# Patient Record
Sex: FEMALE | Race: WHITE | Employment: UNEMPLOYED | URBAN - METROPOLITAN AREA
[De-identification: names, ages, dates, MRNs, and addresses within clinical notes are randomized per-mention and may not be internally consistent; named-entity substitution may affect disease eponyms.]

---

## 2020-09-30 LAB
CHOLESTEROL, TOTAL: 165 MG/DL
CHOLESTEROL/HDL RATIO: NORMAL
HDLC SERPL-MCNC: 40 MG/DL (ref 35–70)
LDL CHOLESTEROL CALCULATED: 87 MG/DL (ref 0–160)
NONHDLC SERPL-MCNC: NORMAL MG/DL
TRIGL SERPL-MCNC: 192 MG/DL
VLDLC SERPL CALC-MCNC: 38 MG/DL

## 2021-03-09 ENCOUNTER — OFFICE VISIT (OUTPATIENT)
Dept: CARDIOLOGY CLINIC | Age: 56
End: 2021-03-09
Payer: COMMERCIAL

## 2021-03-09 VITALS
BODY MASS INDEX: 47.09 KG/M2 | WEIGHT: 293 LBS | SYSTOLIC BLOOD PRESSURE: 126 MMHG | HEART RATE: 76 BPM | HEIGHT: 66 IN | OXYGEN SATURATION: 98 % | RESPIRATION RATE: 18 BRPM | DIASTOLIC BLOOD PRESSURE: 82 MMHG

## 2021-03-09 DIAGNOSIS — I10 ESSENTIAL HYPERTENSION: Primary | ICD-10-CM

## 2021-03-09 DIAGNOSIS — I26.02 SADDLE EMBOLUS OF PULMONARY ARTERY WITH ACUTE COR PULMONALE, UNSPECIFIED CHRONICITY (HCC): ICD-10-CM

## 2021-03-09 DIAGNOSIS — I82.533 CHRONIC DEEP VEIN THROMBOSIS (DVT) OF POPLITEAL VEIN OF BOTH LOWER EXTREMITIES (HCC): ICD-10-CM

## 2021-03-09 DIAGNOSIS — I48.91 ATRIAL FIBRILLATION, UNSPECIFIED TYPE (HCC): ICD-10-CM

## 2021-03-09 DIAGNOSIS — R06.09 DOE (DYSPNEA ON EXERTION): ICD-10-CM

## 2021-03-09 PROCEDURE — 99205 OFFICE O/P NEW HI 60 MIN: CPT | Performed by: INTERNAL MEDICINE

## 2021-03-09 PROCEDURE — 93000 ELECTROCARDIOGRAM COMPLETE: CPT | Performed by: INTERNAL MEDICINE

## 2021-03-09 RX ORDER — HYDROCHLOROTHIAZIDE 12.5 MG/1
1 TABLET ORAL DAILY
COMMUNITY
Start: 2021-01-05 | End: 2021-06-23 | Stop reason: SDUPTHER

## 2021-03-09 RX ORDER — LEVOTHYROXINE SODIUM 0.2 MG/1
200 TABLET ORAL DAILY
COMMUNITY
Start: 2020-10-05 | End: 2021-03-10 | Stop reason: SDUPTHER

## 2021-03-09 RX ORDER — METOPROLOL SUCCINATE 100 MG/1
150 TABLET, EXTENDED RELEASE ORAL 2 TIMES DAILY
COMMUNITY
Start: 2020-10-05 | End: 2021-07-14 | Stop reason: SDUPTHER

## 2021-03-09 RX ORDER — LANOLIN ALCOHOL/MO/W.PET/CERES
50 CREAM (GRAM) TOPICAL DAILY
Status: ON HOLD | COMMUNITY
End: 2021-03-16

## 2021-03-09 RX ORDER — UREA 10 %
800 LOTION (ML) TOPICAL DAILY
COMMUNITY

## 2021-03-09 RX ORDER — MAGNESIUM 200 MG
TABLET ORAL
COMMUNITY

## 2021-03-09 SDOH — HEALTH STABILITY: MENTAL HEALTH: HOW MANY STANDARD DRINKS CONTAINING ALCOHOL DO YOU HAVE ON A TYPICAL DAY?: NOT ASKED

## 2021-03-09 SDOH — HEALTH STABILITY: MENTAL HEALTH: HOW OFTEN DO YOU HAVE A DRINK CONTAINING ALCOHOL?: NOT ASKED

## 2021-03-09 ASSESSMENT — ENCOUNTER SYMPTOMS
EYES NEGATIVE: 1
COUGH: 0
BLOOD IN STOOL: 0
SHORTNESS OF BREATH: 1
STRIDOR: 0
CHEST TIGHTNESS: 0
NAUSEA: 0
GASTROINTESTINAL NEGATIVE: 1
WHEEZING: 0

## 2021-03-09 NOTE — PROGRESS NOTES
NEW PATIENT        Patient: Rodrigo Hernandez  YOB: 1965  MRN: [id number]    Chief Complaint: DVT PE AF TONG   Chief Complaint   Patient presents with    Establish Cardiologist     former [facility] patient    Atrial Fibrillation     discuss need for cardioversion    Hypertension       CV Data:  11/2020 Unprovoked B/L DVT and Saddle Embolism   Thyroid cancer s/p Thyroidectomy     Subjective/HPI pt is SOB with any ADLs. No cp currently. Compliant with all meds. No bleed. No falls. haad AF since PE. Was seeing Dr. Willow Walsh bu changing.  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  Former smoker  No ETOH  Lives with  and kids     EKG: AF 68 QTc 416 ms    Past Medical History:   Diagnosis Date    Atrial fibrillation ([address]     DVT (deep vein thrombosis) in pregnancy     Hypertension     Ventricular tachycardia ([address]        Past Surgical History:   Procedure Laterality Date    APPENDECTOMY      GALLBLADDER SURGERY      THYROIDECTOMY      TONSILLECTOMY AND ADENOIDECTOMY      TUBAL LIGATION         Family History   Problem Relation Age of Onset    Stroke Mother         CVA    Diabetes Mother     Heart Disease Father     Heart Failure Sister        Social History     Socioeconomic History    Marital status: Single     Spouse name: None    Number of children: None    Years of education: None    Highest education level: None   Occupational History    None   Social Needs    Financial resource strain: None    Food insecurity     Worry: None     Inability: None    Transportation needs     Medical: None     Non-medical: None   Tobacco Use    Smoking status: Former Smoker     Types: Cigarettes    Smokeless tobacco: Never Used   Substance and Sexual Activity    Alcohol use: Not Currently    Drug use: None    Sexual activity: None   Lifestyle    Physical activity     Days per week: None     Minutes per session: None    Stress: None   Relationships    Social connections     Talks on phone: None     Gets together: None     Attends [employer] service: None     Active member of club or organization: None     Attends meetings of clubs or organizations: None     Relationship status: None    Intimate partner violence     Fear of current or ex partner: None     Emotionally abused: None     Physically abused: None     Forced sexual activity: None   Other Topics Concern    None   Social History Narrative    None       Allergies   Allergen Reactions    Lisinopril Other (See Comments)       Current Outpatient Medications   Medication Sig Dispense Refill    hydroCHLOROthiazide (HYDRODIURIL) 12.5 MG tablet Take 1 tablet by mouth daily      metoprolol succinate (TOPROL XL) 100 MG extended release tablet Take 150 mg by mouth 2 times daily      levothyroxine (SYNTHROID) 200 MCG tablet Take 200 mcg by mouth daily      Cyanocobalamin (B-12) 1000 MCG SUBL Place under the tongue      folic acid (FOLVITE) 193 MCG tablet Take 800 mcg by mouth daily      vitamin B-6 (PYRIDOXINE) 50 MG tablet Take 50 mg by mouth daily      apixaban (ELIQUIS) 5 MG TABS tablet Take by mouth 2 times daily       No current facility-administered medications for this visit. Review of Systems:   Review of Systems   Constitutional: Negative. Negative for diaphoresis and fatigue. HENT: Negative. Eyes: Negative. Respiratory: Positive for shortness of breath. Negative for cough, chest tightness, wheezing and stridor. Cardiovascular: Negative. Negative for chest pain, palpitations and leg swelling. Gastrointestinal: Negative. Negative for blood in stool and nausea. Genitourinary: Negative. Musculoskeletal: Negative. Skin: Negative. Neurological: Negative. Negative for dizziness, syncope, weakness and light-headedness. Hematological: Negative. Psychiatric/Behavioral: Negative.           Physical Examination:    /82 (Site: Right Upper Arm, Position: Sitting, Cuff Size: Large Adult)   Pulse 76   Resp 18   Ht 5' 6\" (1.676 m)   Wt (!) 330 lb (149.7 kg)   SpO2 98%   BMI 53.26 kg/m²    Physical Exam   Constitutional: She appears healthy. No distress. HENT:   Normal cephalic and Atraumatic   Eyes: Pupils are equal, round, and reactive to light. Neck: Normal range of motion and thyroid normal. Neck supple. No JVD present. No neck adenopathy. No thyromegaly present. Cardiovascular: Normal rate, intact distal pulses and normal pulses. An irregularly irregular rhythm present. Murmur heard. Pulmonary/Chest: Effort normal and breath sounds normal. She has no wheezes. She has no rales. She exhibits no tenderness. Abdominal: Soft. Bowel sounds are normal. There is no abdominal tenderness. Musculoskeletal: Normal range of motion. General: No tenderness or edema. Neurological: She is alert and oriented to person, place, and time. Skin: Skin is warm. No cyanosis. Nails show no clubbing. LABS:  CBC: No results found for: WBC, RBC, HGB, HCT, MCV, MCH, MCHC, RDW, PLT, MPV  Lipids:No results found for: CHOL  No results found for: TRIG  No results found for: HDL  No results found for: LDLCHOLESTEROL, LDLCALC  No results found for: LABVLDL, VLDL  No results found for: CHOLHDLRATIO  CMP:  No results found for: NA, K, CL, CO2, BUN, CREATININE, GFRAA, AGRATIO, LABGLOM, GLUCOSE, PROT, LABALBU, CALCIUM, BILITOT, ALKPHOS, AST, ALT  BMP:  No results found for: NA, K, CL, CO2, BUN, LABALBU, CREATININE, CALCIUM, GFRAA, LABGLOM, GLUCOSE  Magnesium:  No results found for: MG  TSH:No results found for: TSHFT4, TSH          There is no problem list on file for this patient. There are no discontinued medications. Modified Medications    No medications on file       No orders of the defined types were placed in this encounter. Assessment/Plan:    1. Essential hypertension     - Basic Metabolic Panel; Future  - CBC; Future  - TSH without Reflex;  Future  - Magnesium;

## 2021-03-10 ENCOUNTER — OFFICE VISIT (OUTPATIENT)
Dept: ENDOCRINOLOGY | Age: 56
End: 2021-03-10
Payer: COMMERCIAL

## 2021-03-10 VITALS
SYSTOLIC BLOOD PRESSURE: 138 MMHG | BODY MASS INDEX: 47.09 KG/M2 | OXYGEN SATURATION: 95 % | WEIGHT: 293 LBS | DIASTOLIC BLOOD PRESSURE: 88 MMHG | HEIGHT: 66 IN | HEART RATE: 81 BPM

## 2021-03-10 DIAGNOSIS — E03.9 HYPOTHYROIDISM, UNSPECIFIED TYPE: Primary | ICD-10-CM

## 2021-03-10 DIAGNOSIS — I10 ESSENTIAL HYPERTENSION: ICD-10-CM

## 2021-03-10 DIAGNOSIS — E89.0 POSTOPERATIVE HYPOTHYROIDISM: ICD-10-CM

## 2021-03-10 DIAGNOSIS — E03.9 HYPOTHYROIDISM, UNSPECIFIED TYPE: ICD-10-CM

## 2021-03-10 DIAGNOSIS — C73 PAPILLARY THYROID CARCINOMA (HCC): ICD-10-CM

## 2021-03-10 LAB
ANION GAP SERPL CALCULATED.3IONS-SCNC: 16 MEQ/L (ref 9–15)
BUN BLDV-MCNC: 19 MG/DL (ref 6–20)
CALCIUM SERPL-MCNC: 9.6 MG/DL (ref 8.5–9.9)
CHLORIDE BLD-SCNC: 100 MEQ/L (ref 95–107)
CO2: 25 MEQ/L (ref 20–31)
CREAT SERPL-MCNC: 1.08 MG/DL (ref 0.5–0.9)
GFR AFRICAN AMERICAN: >60
GFR NON-AFRICAN AMERICAN: 52.7
GLUCOSE BLD-MCNC: 112 MG/DL (ref 70–99)
HCT VFR BLD CALC: 51.8 % (ref 37–47)
HEMOGLOBIN: 17 G/DL (ref 12–16)
MAGNESIUM: 2.1 MG/DL (ref 1.7–2.4)
MCH RBC QN AUTO: 31.5 PG (ref 27–31.3)
MCHC RBC AUTO-ENTMCNC: 32.8 % (ref 33–37)
MCV RBC AUTO: 96 FL (ref 82–100)
PDW BLD-RTO: 15.1 % (ref 11.5–14.5)
PLATELET # BLD: 253 K/UL (ref 130–400)
POTASSIUM SERPL-SCNC: 4.2 MEQ/L (ref 3.4–4.9)
RBC # BLD: 5.39 M/UL (ref 4.2–5.4)
SODIUM BLD-SCNC: 141 MEQ/L (ref 135–144)
T4 FREE: 1.61 NG/DL (ref 0.84–1.68)
TSH REFLEX: 2.05 UIU/ML (ref 0.44–3.86)
WBC # BLD: 7.7 K/UL (ref 4.8–10.8)

## 2021-03-10 PROCEDURE — 99203 OFFICE O/P NEW LOW 30 MIN: CPT | Performed by: INTERNAL MEDICINE

## 2021-03-10 RX ORDER — LEVOTHYROXINE SODIUM 0.2 MG/1
TABLET ORAL
Qty: 50 TABLET | Refills: 11 | Status: SHIPPED | OUTPATIENT
Start: 2021-03-10 | End: 2022-02-02 | Stop reason: SDUPTHER

## 2021-03-10 ASSESSMENT — ENCOUNTER SYMPTOMS
SWOLLEN GLANDS: 0
TROUBLE SWALLOWING: 0
RESPIRATORY NEGATIVE: 1

## 2021-03-10 NOTE — PROGRESS NOTES
Subjective:      Patient ID: Leatha Rae is a 48 y.o. female. Patient referred here for hypothyroidism secondary to total thyroidectomy for papillary thyroid carcinoma diagnosed in 2019 status post iodine and whole-body scan and ablation patient's thyroid function test and thyroid uptake and scan was reviewed from March 2019  Patient's last thyroglobulin level was less than 0.2 thyroid function tests were normal from September 2020 thyroid replacement is 200 mcg 6 days a week and 300mcg 1 day a week  Other  This is a new (Hypothyroidism) problem. The current episode started more than 1 year ago. The problem occurs rarely. The problem has been waxing and waning. Pertinent negatives include no neck pain or swollen glands. Exacerbated by: Total thyroidectomy. Treatments tried: Levothyroxine. The treatment provided moderate relief. Thyroid function test done from 9/30/2020 Free T4 was 1.3 TSH was 1.890 thyroglobulin level less than 0.2    Chemistries glucose was 133 BUN was 20 creatinine 1.49 cholesterol 165 triglyceride 192    Morbid obesity Body mass index is 53.1 kg/m². IMPRESSION:    Abnormal I-123 uptake in the thyroid bed region adjacent to the right   side of the thyroid cartilage likely related to residual thyroid tissue. : RAHDA    Transcribe Date/Time: Aug 28 2019  9:48A    Dictated by : Frandy Willis MD    This examination was interpreted and the report reviewed and   electronically signed by:   Frandy Willis MD on Aug 28 2019  9:53AM  EST   Result Narrative   * * *Final Report* * *    DATE OF EXAM: Aug 28 2019  9:11AM      FVN   [id number]  -  NM THY CA WB  / ACCESSION #  [id number]    PROCEDURE REASON: multiple diagnoses         * * * * Physician Interpretation * * * *     I-123 NECK UPTAKE AND SCAN; WHOLE BODY I-123 SCAN (08/28/2019):    HISTORY: Thyroid cancer. TECHNIQUE: 2.0 mCi I-123 sodium iodide po followed by neck uptake   measurements and scan of the neck.  Whole body imaging was also performed. SPECT imaging of neck and upper chest was performed; attenuation   correction noncontrast CT imaging of the same body region was performed   using 1 bed (39cm). CT Dose-Length Product (DLP): 116 mGy*cm. CT Dose Reduction Employed: Yes      RESULT:    The 17.8 hour neck uptake is 1.98%. Whole body and neck images show focal I-123 uptake in the thyroid bed   right of midline. SPECT CT imaging demonstrates increased uptake along the right side of   the thyroid cartilage in the region of the thyroid bed.    ==========   Other Result Information   Radiology, [facility] - 08/28/2019  9:55 AM EDT  * * *Final Report* * *    DATE OF EXAM: Aug 28 2019  9:11AM      FVN   [id number]  -  NM THY CA WB  / ACCESSION #  [id number]    PROCEDURE REASON: multiple diagnoses    * * * * Physician Interpretation * * * *     I-123 NECK UPTAKE AND SCAN; WHOLE BODY I-123 SCAN (08/28/2019):    HISTORY: Thyroid cancer. TECHNIQUE: 2.0 mCi I-123 sodium iodide po followed by neck uptake   measurements and scan of the neck. Whole body imaging was also performed. SPECT imaging of neck and upper chest was performed; attenuation   correction noncontrast CT imaging of the same body region was performed   using 1 bed (39cm). CT Dose-Length Product (DLP): 116 mGy*cm. CT Dose Reduction Employed: Yes      RESULT:    The 17.8 hour neck uptake is 1.98%. Whole body and neck images show focal I-123 uptake in the thyroid bed   right of midline. SPECT CT imaging demonstrates increased uptake along the right side of   the thyroid cartilage in the region of the thyroid bed.    ==========      IMPRESSION  IMPRESSION:    Abnormal I-123 uptake in the thyroid bed region adjacent to the right   side of the thyroid cartilage likely related to residual thyroid tissue.             : RADHA    Transcribe Date/Time: Aug 28 2019  9:48A    Dictated by : Larry Cool MD    This examination was interpreted and the report reviewed and   electronically signed by:   Sonali Valladares MD on Aug 28 2019  9:53AM  EST         IMPRESSION:  I-131 THERAPY, AS ABOVE. : PSC[facility]    Transcribe Date/Time: Aug 28 2019  3:20P    Dictated by : Georgia Reynaga MD    This examination was interpreted and the report reviewed and   electronically signed by:   Georgia Reynaga MD on Aug 28 2019  3:47PM  EST   Result Narrative   * * *Final Report* * *    DATE OF EXAM: Aug 28 2019  3:24PM      FVN   [id number]  -  NM THERAPY THYROID I-131  / ACCESSION #  [id number]    PROCEDURE REASON: multiple diagnoses         * * * * Physician Interpretation * * * *     I-131 THERAPY:    HISTORY: Multifocal bilateral papillary thyroid cancer with 1.5 cm tall   cell variant (right thyroid), and lymphovascular invasion status post   total thyroidectomy on 7/10/2019; radioiodine ablation therapy. COMPARISON: I-123 whole body scan and uptake, and chest CT of the neck   and upper chest from the same day    TECHNIQUE:  After the risks, benefits, precautions and alternatives of I-131 therapy   were discussed with the patient, the patient understood and agreed to   proceed with I-131 therapy.  The patient received a written set of   instructions for I-131 therapy. Following this discussion, and following consultation with Dr. Marjorie Rudolph, the patient received 79.4 mCi I-131 sodium iodide PO for   treatment. The patient will follow up with Dr. Caitlin Diez.    Other Result Information   Radiology, [facility] - 08/28/2019  3:50 PM EDT  * * *Final Report* * *    DATE OF EXAM: Aug 28 2019  3:24PM      FVN   [id number]  -  NM THERAPY THYROID I-131  / ACCESSION #  [id number]    PROCEDURE REASON: multiple diagnoses    * * * * Physician Interpretation * * * *     I-131 THERAPY:    HISTORY: Multifocal bilateral papillary thyroid cancer with 1.5 cm tall   cell variant (right thyroid), and lymphovascular invasion status post   total thyroidectomy on 7/10/2019; radioiodine ablation therapy. COMPARISON: I-123 whole body scan and uptake, and chest CT of the neck   and upper chest from the same day    TECHNIQUE:  After the risks, benefits, precautions and alternatives of I-131 therapy   were discussed with the patient, the patient understood and agreed to   proceed with I-131 therapy. The patient received a written set of   instructions for I-131 therapy. Following this discussion, and following consultation with Dr. Marjorie Rudolph, the patient received 79.4 mCi I-131 sodium iodide PO for   treatment. The patient will follow up with Dr. Caitlin Diez. IMPRESSION  IMPRESSION:  I-131 THERAPY, AS ABOVE.           : RADHA    Transcribe Date/Time: Aug 28 2019  3:20P    Dictated by : Georgia Reynaga MD    This examination was interpreted and the report reviewed and   electronically signed by:   Georgia Reynaga MD on Aug 28 2019  3:47PM  EST   Status       Patient Active Problem List   Diagnosis    Postoperative hypothyroidism    Papillary thyroid carcinoma ([facility] [address]     Social History     Socioeconomic History    Marital status: Single     Spouse name: Not on file    Number of children: Not on file    Years of education: Not on file    Highest education level: Not on file   Occupational History    Not on file   Social Needs    Financial resource strain: Not on file    Food insecurity     Worry: Not on file     Inability: Not on file    Transportation needs     Medical: Not on file     Non-medical: Not on file   Tobacco Use    Smoking status: Former Smoker     Types: Cigarettes    Smokeless tobacco: Never Used   Substance and Sexual Activity    Alcohol use: Not Currently    Drug use: Not on file    Sexual activity: Not on file   Lifestyle    Physical activity     Days per week: Not on file     Minutes per session: Not on file    Stress: Not on file   Relationships    Social connections     Talks on phone: Not on file     Gets together: Not on file Attends Rastafari service: Not on file     Active member of club or organization: Not on file     Attends meetings of clubs or organizations: Not on file     Relationship status: Not on file    Intimate partner violence     Fear of current or ex partner: Not on file     Emotionally abused: Not on file     Physically abused: Not on file     Forced sexual activity: Not on file   Other Topics Concern    Not on file   Social History Narrative    Not on file     Past Surgical History:   Procedure Laterality Date    APPENDECTOMY      GALLBLADDER SURGERY      THYROIDECTOMY      TONSILLECTOMY AND ADENOIDECTOMY      TUBAL LIGATION       Family History   Problem Relation Age of Onset    Stroke Mother         CVA    Diabetes Mother     Heart Disease Father     Heart Failure Sister      Allergies   Allergen Reactions    Lisinopril Other (See Comments)       Current Outpatient Medications:     levothyroxine (SYNTHROID) 200 MCG tablet, 1 po 6 days weeks and 1 1/2 po Sunday, Disp: 50 tablet, Rfl: 11    hydroCHLOROthiazide (HYDRODIURIL) 12.5 MG tablet, Take 1 tablet by mouth daily, Disp: , Rfl:     metoprolol succinate (TOPROL XL) 100 MG extended release tablet, Take 150 mg by mouth 2 times daily, Disp: , Rfl:     Cyanocobalamin (B-12) 1000 MCG SUBL, Place under the tongue, Disp: , Rfl:     folic acid (FOLVITE) 591 MCG tablet, Take 800 mcg by mouth daily, Disp: , Rfl:     vitamin B-6 (PYRIDOXINE) 50 MG tablet, Take 50 mg by mouth daily, Disp: , Rfl:     apixaban (ELIQUIS) 5 MG TABS tablet, Take by mouth 2 times daily, Disp: , Rfl:       Review of Systems   HENT: Negative for trouble swallowing. Respiratory: Negative. Cardiovascular: Negative. Endocrine: Negative. Musculoskeletal: Negative for neck pain. Hematological: Negative for adenopathy. All other systems reviewed and are negative.     Vitals:    03/10/21 0858 03/10/21 0900   BP: (!) 139/90 138/88   Pulse: 81    SpO2: 95%    Weight: (!) 329 lb (149.2 kg)    Height: 5' 6\" (1.676 m)        Objective:   Physical Exam  Constitutional:       Appearance: Normal appearance. She is obese. HENT:      Head: Normocephalic and atraumatic. Right Ear: External ear normal.      Left Ear: External ear normal.      Nose: Nose normal.      Mouth/Throat:      Mouth: Mucous membranes are moist.   Eyes:      General: No scleral icterus. Right eye: No discharge. Left eye: No discharge. Extraocular Movements: Extraocular movements intact. Conjunctiva/sclera: Conjunctivae normal.   Neck:      Musculoskeletal: Normal range of motion. Cardiovascular:      Rate and Rhythm: Normal rate and regular rhythm. Heart sounds: Normal heart sounds. Pulmonary:      Breath sounds: Normal breath sounds. Abdominal:      Palpations: Abdomen is soft. Musculoskeletal:      Right lower leg: No edema. Left lower leg: No edema. Skin:     General: Skin is warm and dry. Neurological:      General: No focal deficit present. Mental Status: She is alert and oriented to person, place, and time. Psychiatric:         Mood and Affect: Mood normal.         Behavior: Behavior normal.         Assessment:       Diagnosis Orders   1. Hypothyroidism, unspecified type  T4, Free    TSH with Reflex    Anti-Thyroglobulin Antibody    Thyroglobulin    Basic Metabolic Panel   2.  Papillary thyroid carcinoma ([address]             Plan:      Orders Placed This Encounter   Procedures    T4, Free     Standing Status:   Future     Standing Expiration Date:   3/10/2022    TSH with Reflex     Standing Status:   Future     Standing Expiration Date:   3/10/2022    Anti-Thyroglobulin Antibody     Standing Status:   Future     Standing Expiration Date:   3/10/2022    Thyroglobulin     Standing Status:   Future     Standing Expiration Date:   3/10/2022    Basic Metabolic Panel     Standing Status:   Future     Standing Expiration Date:   3/10/2022     Orders Placed This Encounter   Medications    levothyroxine (SYNTHROID) 200 MCG tablet     Si po 6 days weeks and 1 1/2 po      Dispense:  50 tablet     Refill:  11     Continue current dose of levothyroxine 200 mcg daily 6 days a week in 300 mcg on  thyroid function test BMP thyroglobulin thyroglobulin antibody to be done today follow-up in 4 weeks to review results  More than 50% of 30-minute spent in patient education counseling review of prior records          Osmani Comer MD

## 2021-03-11 LAB
THYROGLOBULIN AB: <0.9 IU/ML (ref 0–4)
THYROGLOBULIN BY LC-MS/MS, SERUM/PLASMA: ABNORMAL NG/ML (ref 1.3–31.8)
THYROGLOBULIN, SERUM OR PLASMA: 0.1 NG/ML (ref 1.3–31.8)

## 2021-03-16 ENCOUNTER — HOSPITAL ENCOUNTER (OUTPATIENT)
Dept: CARDIAC CATH/INVASIVE PROCEDURES | Age: 56
Discharge: HOME | End: 2021-03-16
Attending: INTERNAL MEDICINE | Admitting: INTERNAL MEDICINE
Payer: COMMERCIAL

## 2021-03-16 VITALS
HEIGHT: 66 IN | WEIGHT: 293 LBS | HEART RATE: 75 BPM | DIASTOLIC BLOOD PRESSURE: 96 MMHG | SYSTOLIC BLOOD PRESSURE: 118 MMHG | OXYGEN SATURATION: 96 % | BODY MASS INDEX: 47.09 KG/M2 | TEMPERATURE: 98.6 F | RESPIRATION RATE: 23 BRPM

## 2021-03-16 LAB
EKG ATRIAL RATE: 250 BPM
EKG ATRIAL RATE: 69 BPM
EKG P AXIS: 41 DEGREES
EKG P-R INTERVAL: 182 MS
EKG Q-T INTERVAL: 364 MS
EKG Q-T INTERVAL: 440 MS
EKG QRS DURATION: 86 MS
EKG QRS DURATION: 90 MS
EKG QTC CALCULATION (BAZETT): 442 MS
EKG QTC CALCULATION (BAZETT): 471 MS
EKG R AXIS: 33 DEGREES
EKG R AXIS: 40 DEGREES
EKG T AXIS: 214 DEGREES
EKG T AXIS: 32 DEGREES
EKG VENTRICULAR RATE: 69 BPM
EKG VENTRICULAR RATE: 89 BPM

## 2021-03-16 PROCEDURE — 92960 CARDIOVERSION ELECTRIC EXT: CPT | Performed by: INTERNAL MEDICINE

## 2021-03-16 PROCEDURE — 93005 ELECTROCARDIOGRAM TRACING: CPT | Performed by: INTERNAL MEDICINE

## 2021-03-16 PROCEDURE — 6360000002 HC RX W HCPCS: Performed by: INTERNAL MEDICINE

## 2021-03-16 PROCEDURE — 6360000002 HC RX W HCPCS

## 2021-03-16 PROCEDURE — 2580000003 HC RX 258: Performed by: INTERNAL MEDICINE

## 2021-03-16 PROCEDURE — 93010 ELECTROCARDIOGRAM REPORT: CPT | Performed by: INTERNAL MEDICINE

## 2021-03-16 RX ORDER — SODIUM CHLORIDE 9 MG/ML
INJECTION, SOLUTION INTRAVENOUS CONTINUOUS
Status: DISCONTINUED | OUTPATIENT
Start: 2021-03-16 | End: 2021-03-16 | Stop reason: HOSPADM

## 2021-03-16 RX ORDER — PROPOFOL 10 MG/ML
INJECTION, EMULSION INTRAVENOUS
Status: COMPLETED | OUTPATIENT
Start: 2021-03-16 | End: 2021-03-16

## 2021-03-16 RX ORDER — SODIUM CHLORIDE 0.9 % (FLUSH) 0.9 %
10 SYRINGE (ML) INJECTION EVERY 12 HOURS SCHEDULED
Status: DISCONTINUED | OUTPATIENT
Start: 2021-03-16 | End: 2021-03-16 | Stop reason: HOSPADM

## 2021-03-16 RX ORDER — PYRIDOXINE HCL (VITAMIN B6) 100 MG
50 TABLET ORAL DAILY
COMMUNITY
End: 2022-06-14 | Stop reason: ALTCHOICE

## 2021-03-16 RX ORDER — SODIUM CHLORIDE 0.9 % (FLUSH) 0.9 %
10 SYRINGE (ML) INJECTION PRN
Status: DISCONTINUED | OUTPATIENT
Start: 2021-03-16 | End: 2021-03-16 | Stop reason: HOSPADM

## 2021-03-16 RX ORDER — TRIAMCINOLONE ACETONIDE 1 MG/G
CREAM TOPICAL 2 TIMES DAILY
Status: DISCONTINUED | OUTPATIENT
Start: 2021-03-16 | End: 2021-03-16 | Stop reason: HOSPADM

## 2021-03-16 RX ADMIN — PROPOFOL 40 MG: 10 INJECTION, EMULSION INTRAVENOUS at 09:42

## 2021-03-16 RX ADMIN — SODIUM CHLORIDE: 9 INJECTION, SOLUTION INTRAVENOUS at 08:45

## 2021-03-16 RX ADMIN — PROPOFOL 20 MG: 10 INJECTION, EMULSION INTRAVENOUS at 09:44

## 2021-03-16 NOTE — PROGRESS NOTES
Patient arrived to cath holding area, rights and responsibilites reviewed, consents signed and patient prepped for procedure.

## 2021-03-16 NOTE — BRIEF OP NOTE
Brief Postoperative Note      Patient: Ambrose Olvera  YOB: 1965  MRN: [id number]    Section of Cardiology  Adult Brief Procedure Note        Procedure(s):  Cardioversion    Pre-operative Diagnosis:  AF    H&P Status: Completed and reviewed.      Post-operative Diagnosis:  Propofol total 60 mg.  200J x1 to SR 62        Complications:  none    Primary Proceduralist:   Navjot London MD

## 2021-03-16 NOTE — PROGRESS NOTES
VSS, Patient is resting comfortably at this time, Patient is alert and oriented with no complaints of pain or shortness of breath. Discharge instructions reviewed with patient and verbalization of understanding occurred per Marshall Thornton RN. Patient was discharged home in care of family.

## 2021-03-17 ENCOUNTER — TELEPHONE (OUTPATIENT)
Dept: CARDIOLOGY CLINIC | Age: 56
End: 2021-03-17

## 2021-03-17 NOTE — TELEPHONE ENCOUNTER
Patient calling stating she has felt a fluttering this morning that lasted a couple of hours and then went away. The feeling is gone now. Should she be concerned since she just had cardioversion yesterday? She denies shortness of breath and chest pain. HR was 67 during the episode.

## 2021-03-17 NOTE — PROCEDURES
[address]                      [address], [address] [facility]                                 PROCEDURE NOTE    PATIENT NAME: Lisbet Worrell                   :        1965  MED REC NO:   [id number]                            ROOM:  ACCOUNT NO:   [de-identified]                           ADMIT DATE: 2021  PROVIDER:     Alisha Sadler MD    DATE OF PROCEDURE:  2021    CARDIOVERSION REPORT    INDICATION FOR PROCEDURE:  Atrial fibrillation. OPERATIVE PROCEDURE:  After adequate outpatient anticoagulation, the  patient was brought to the cardiac cath pre and post-recovery area. She  was prepped in the usual manner. Propofol, incremental doses 40 mg plus 20 mg, was given for sedation. 200 joules biphasic energy x1 delivered converting from AFib rate 83 to  a sinus rhythm of 63. The patient tolerated the procedure well.         Blanka Cunningham MD    D: 2021 10:15:07       T: 2021 10:21:34     [id number]  Job#: [id number]     Doc#: [id number]    CC:

## 2021-03-23 ENCOUNTER — OFFICE VISIT (OUTPATIENT)
Dept: CARDIOLOGY CLINIC | Age: 56
End: 2021-03-23
Payer: COMMERCIAL

## 2021-03-23 VITALS
SYSTOLIC BLOOD PRESSURE: 118 MMHG | HEIGHT: 66 IN | BODY MASS INDEX: 47.09 KG/M2 | HEART RATE: 92 BPM | OXYGEN SATURATION: 96 % | RESPIRATION RATE: 18 BRPM | DIASTOLIC BLOOD PRESSURE: 72 MMHG | WEIGHT: 293 LBS

## 2021-03-23 DIAGNOSIS — I10 ESSENTIAL HYPERTENSION: ICD-10-CM

## 2021-03-23 DIAGNOSIS — I26.02 SADDLE EMBOLUS OF PULMONARY ARTERY WITH ACUTE COR PULMONALE, UNSPECIFIED CHRONICITY (HCC): ICD-10-CM

## 2021-03-23 DIAGNOSIS — R06.09 DOE (DYSPNEA ON EXERTION): ICD-10-CM

## 2021-03-23 DIAGNOSIS — I48.91 ATRIAL FIBRILLATION, UNSPECIFIED TYPE (HCC): ICD-10-CM

## 2021-03-23 DIAGNOSIS — I82.533 CHRONIC DEEP VEIN THROMBOSIS (DVT) OF POPLITEAL VEIN OF BOTH LOWER EXTREMITIES (HCC): ICD-10-CM

## 2021-03-23 DIAGNOSIS — I48.0 PAF (PAROXYSMAL ATRIAL FIBRILLATION) (HCC): Primary | ICD-10-CM

## 2021-03-23 PROCEDURE — 99214 OFFICE O/P EST MOD 30 MIN: CPT | Performed by: INTERNAL MEDICINE

## 2021-03-23 PROCEDURE — 93000 ELECTROCARDIOGRAM COMPLETE: CPT | Performed by: INTERNAL MEDICINE

## 2021-03-23 ASSESSMENT — ENCOUNTER SYMPTOMS
WHEEZING: 0
SHORTNESS OF BREATH: 1
GASTROINTESTINAL NEGATIVE: 1
EYES NEGATIVE: 1
BLOOD IN STOOL: 0
STRIDOR: 0
NAUSEA: 0
CHEST TIGHTNESS: 0
COUGH: 0

## 2021-03-23 NOTE — PROGRESS NOTES
OFFICE VISIT         Patient: Ori Myers  YOB: 1965  MRN: [id number]    Chief Complaint: DVT PE AF TONG   Chief Complaint   Patient presents with    Follow Up After Procedure     S/P CVN 3/16/21    Atrial Fibrillation       CV Data:  11/2020 Unprovoked B/L DVT and Saddle Embolism   Thyroid cancer s/p Thyroidectomy     Subjective/HPI pt is SOB with any ADLs. No cp currently. Compliant with all meds. No bleed. No falls. haad AF since PE. Was seeing Dr. Main Aiken but changing. There were plans for CVN and Antiarrhythmic    3/23/21 still winded with ADLs. No pain. Went back into AF. No cp tired.      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  Former smoker  No ETOH  Lives with  and kids     EKG: AF 80 QTc 477 ms    Past Medical History:   Diagnosis Date    Atrial fibrillation ([address]     DVT (deep vein thrombosis) in pregnancy     Hypertension     Ventricular tachycardia ([address]        Past Surgical History:   Procedure Laterality Date    APPENDECTOMY      CARDIOVERSION  03/16/2021    GALLBLADDER SURGERY      THYROIDECTOMY      TONSILLECTOMY AND ADENOIDECTOMY      TUBAL LIGATION         Family History   Problem Relation Age of Onset    Stroke Mother         CVA    Diabetes Mother     Heart Disease Father     Heart Failure Sister        Social History     Socioeconomic History    Marital status:      Spouse name: None    Number of children: None    Years of education: None    Highest education level: None   Occupational History    None   Social Needs    Financial resource strain: None    Food insecurity     Worry: None     Inability: None    Transportation needs     Medical: None     Non-medical: None   Tobacco Use    Smoking status: Former Smoker     Types: Cigarettes    Smokeless tobacco: Never Used   Substance and Sexual Activity    Alcohol use: Not Currently    Drug use: None    Sexual activity: None   Lifestyle    Physical activity     Days per week: None     Minutes per session: None    Stress: None   Relationships    Social connections     Talks on phone: None     Gets together: None     Attends [employer] service: None     Active member of club or organization: None     Attends meetings of clubs or organizations: None     Relationship status: None    Intimate partner violence     Fear of current or ex partner: None     Emotionally abused: None     Physically abused: None     Forced sexual activity: None   Other Topics Concern    None   Social History Narrative    None       Allergies   Allergen Reactions    Lisinopril Other (See Comments)       Current Outpatient Medications   Medication Sig Dispense Refill    pyridoxine (B-6) 100 MG tablet Take 50 mg by mouth daily      levothyroxine (SYNTHROID) 200 MCG tablet 1 po 6 days weeks and 1 1/2 po Sunday 50 tablet 11    hydroCHLOROthiazide (HYDRODIURIL) 12.5 MG tablet Take 1 tablet by mouth daily      metoprolol succinate (TOPROL XL) 100 MG extended release tablet Take 150 mg by mouth 2 times daily      Cyanocobalamin (B-12) 1000 MCG SUBL Place under the tongue      folic acid (FOLVITE) 168 MCG tablet Take 800 mcg by mouth daily      apixaban (ELIQUIS) 5 MG TABS tablet Take by mouth 2 times daily       No current facility-administered medications for this visit. Review of Systems:   Review of Systems   Constitutional: Negative. Negative for diaphoresis and fatigue. HENT: Negative. Eyes: Negative. Respiratory: Positive for shortness of breath. Negative for cough, chest tightness, wheezing and stridor. Cardiovascular: Negative. Negative for chest pain, palpitations and leg swelling. Gastrointestinal: Negative. Negative for blood in stool and nausea. Genitourinary: Negative. Musculoskeletal: Negative. Skin: Negative. Neurological: Negative. Negative for dizziness, syncope, weakness and light-headedness. Hematological: Negative. Psychiatric/Behavioral: Negative.           Physical Examination: /72 (Site: Right Upper Arm, Position: Sitting, Cuff Size: Large Adult)   Pulse 92   Resp 18   Ht 5' 6\" (1.676 m)   Wt (!) 330 lb (149.7 kg)   SpO2 96%   BMI 53.26 kg/m²    Physical Exam   Constitutional: She appears healthy. No distress. HENT:   Normal cephalic and Atraumatic   Eyes: Pupils are equal, round, and reactive to light. Neck: Normal range of motion and thyroid normal. Neck supple. No JVD present. No neck adenopathy. No thyromegaly present. Cardiovascular: Normal rate, intact distal pulses and normal pulses. An irregularly irregular rhythm present. Murmur heard. Pulmonary/Chest: Effort normal and breath sounds normal. She has no wheezes. She has no rales. She exhibits no tenderness. Abdominal: Soft. Bowel sounds are normal. There is no abdominal tenderness. Musculoskeletal: Normal range of motion. General: No tenderness or edema. Neurological: She is alert and oriented to person, place, and time. Skin: Skin is warm. No cyanosis. Nails show no clubbing.        LABS:  CBC:   Lab Results   Component Value Date    WBC 7.7 03/10/2021    RBC 5.39 03/10/2021    HGB 17.0 03/10/2021    HCT 51.8 03/10/2021    MCV 96.0 03/10/2021    MCH 31.5 03/10/2021    MCHC 32.8 03/10/2021    RDW 15.1 03/10/2021     03/10/2021     Lipids:No results found for: CHOL  No results found for: TRIG  No results found for: HDL  No results found for: LDLCHOLESTEROL, LDLCALC  No results found for: LABVLDL, VLDL  No results found for: CHOLHDLRATIO  CMP:    Lab Results   Component Value Date     03/10/2021    K 4.2 03/10/2021     03/10/2021    CO2 25 03/10/2021    BUN 19 03/10/2021    CREATININE 1.08 03/10/2021    GFRAA >60.0 03/10/2021    LABGLOM 52.7 03/10/2021    GLUCOSE 112 03/10/2021    CALCIUM 9.6 03/10/2021     BMP:    Lab Results   Component Value Date     03/10/2021    K 4.2 03/10/2021     03/10/2021    CO2 25 03/10/2021    BUN 19 03/10/2021    CREATININE 1.08

## 2021-04-05 ENCOUNTER — OFFICE VISIT (OUTPATIENT)
Dept: FAMILY MEDICINE CLINIC | Age: 56
End: 2021-04-05
Payer: COMMERCIAL

## 2021-04-05 VITALS
WEIGHT: 293 LBS | RESPIRATION RATE: 18 BRPM | SYSTOLIC BLOOD PRESSURE: 126 MMHG | DIASTOLIC BLOOD PRESSURE: 88 MMHG | HEART RATE: 92 BPM | TEMPERATURE: 96.5 F | BODY MASS INDEX: 47.09 KG/M2 | OXYGEN SATURATION: 95 % | HEIGHT: 66 IN

## 2021-04-05 DIAGNOSIS — I48.0 PAF (PAROXYSMAL ATRIAL FIBRILLATION) (HCC): Primary | ICD-10-CM

## 2021-04-05 DIAGNOSIS — I26.92 SADDLE EMBOLUS OF PULMONARY ARTERY, UNSPECIFIED CHRONICITY, UNSPECIFIED WHETHER ACUTE COR PULMONALE PRESENT (HCC): ICD-10-CM

## 2021-04-05 DIAGNOSIS — R79.89 ELEVATED LFTS: ICD-10-CM

## 2021-04-05 DIAGNOSIS — M76.61 ACHILLES TENDINITIS OF BOTH LOWER EXTREMITIES: ICD-10-CM

## 2021-04-05 DIAGNOSIS — E89.0 POSTOPERATIVE HYPOTHYROIDISM: ICD-10-CM

## 2021-04-05 DIAGNOSIS — M76.62 ACHILLES TENDINITIS OF BOTH LOWER EXTREMITIES: ICD-10-CM

## 2021-04-05 DIAGNOSIS — N18.31 STAGE 3A CHRONIC KIDNEY DISEASE (HCC): ICD-10-CM

## 2021-04-05 PROCEDURE — 99203 OFFICE O/P NEW LOW 30 MIN: CPT | Performed by: FAMILY MEDICINE

## 2021-04-05 SDOH — ECONOMIC STABILITY: FOOD INSECURITY: WITHIN THE PAST 12 MONTHS, YOU WORRIED THAT YOUR FOOD WOULD RUN OUT BEFORE YOU GOT MONEY TO BUY MORE.: NEVER TRUE

## 2021-04-05 SDOH — ECONOMIC STABILITY: INCOME INSECURITY: HOW HARD IS IT FOR YOU TO PAY FOR THE VERY BASICS LIKE FOOD, HOUSING, MEDICAL CARE, AND HEATING?: NOT HARD AT ALL

## 2021-04-05 SDOH — ECONOMIC STABILITY: TRANSPORTATION INSECURITY
IN THE PAST 12 MONTHS, HAS THE LACK OF TRANSPORTATION KEPT YOU FROM MEDICAL APPOINTMENTS OR FROM GETTING MEDICATIONS?: NO

## 2021-04-05 ASSESSMENT — ENCOUNTER SYMPTOMS
NAUSEA: 0
ABDOMINAL PAIN: 0
DIARRHEA: 0
APNEA: 0
VOMITING: 0
BLOOD IN STOOL: 0
CHEST TIGHTNESS: 0
COUGH: 0
CONSTIPATION: 0
SHORTNESS OF BREATH: 0

## 2021-04-05 ASSESSMENT — PATIENT HEALTH QUESTIONNAIRE - PHQ9: 1. LITTLE INTEREST OR PLEASURE IN DOING THINGS: 0

## 2021-04-05 NOTE — PROGRESS NOTES
Subjective:      Patient ID: Ama Carvajal is a 55 y.o. female who presents today for:     Chief Complaint   Patient presents with    New Patient     Patient presents today to establish care. Previous PCP was Dr. Amber Wallace with [facility].  Foot Pain     Patient reports heel pain in both heels x5 months. Patient states this has gotten worse since first onset. Pt reports this does effect her walking. HPI  Patient is a very pleasant 60-year-old female presents today to establish care. She has a history of an unprovoked bilateral DVT and saddle embolism as well as thyroid cancer status post thyroidectomy. She has had atrial fibrillation since her pulmonary embolism and follows up with cardiology. SHe is on anticoagulation with Eliquis. SHe has an upcoming stress test . she also follows up with endocrinology for management of her thyroid function. Patient also has been experiencing longstanding posterior heel pain. She states that it improves as she gets up and walks but is worse first thing in the morning.   She has tried different shoes without significant improvement  Past Medical History:   Diagnosis Date    Atrial fibrillation ([address]     DVT (deep vein thrombosis) in pregnancy     Hypertension     Ventricular tachycardia (HCC)      Past Surgical History:   Procedure Laterality Date    APPENDECTOMY      CARDIOVERSION  03/16/2021    GALLBLADDER SURGERY      THYROIDECTOMY      TONSILLECTOMY AND ADENOIDECTOMY      TUBAL LIGATION       Family History   Problem Relation Age of Onset    Stroke Mother         CVA    Diabetes Mother     Heart Disease Father     Heart Failure Sister      Social History     Socioeconomic History    Marital status:      Spouse name: Not on file    Number of children: Not on file    Years of education: Not on file    Highest education level: Not on file   Occupational History    Not on file   Social Needs    Financial resource strain: Not hard at all   Janae-Neymar insecurity     Worry: Never true     Inability: Never true    Transportation needs     Medical: No     Non-medical: No   Tobacco Use    Smoking status: Former Smoker     Packs/day: 1.50     Years: 35.00     Pack years: 52.50     Types: Cigarettes     Start date:      Quit date:      Years since quittin.2    Smokeless tobacco: Never Used   Substance and Sexual Activity    Alcohol use: Not Currently    Drug use: Not on file    Sexual activity: Not on file   Lifestyle    Physical activity     Days per week: Not on file     Minutes per session: Not on file    Stress: Not on file   Relationships    Social connections     Talks on phone: Not on file     Gets together: Not on file     Attends Protestant service: Not on file     Active member of club or organization: Not on file     Attends meetings of clubs or organizations: Not on file     Relationship status: Not on file    Intimate partner violence     Fear of current or ex partner: Not on file     Emotionally abused: Not on file     Physically abused: Not on file     Forced sexual activity: Not on file   Other Topics Concern    Not on file   Social History Narrative    Not on file     Current Outpatient Medications on File Prior to Visit   Medication Sig Dispense Refill    pyridoxine (B-6) 100 MG tablet Take 50 mg by mouth daily      levothyroxine (SYNTHROID) 200 MCG tablet 1 po 6 days weeks and 1 1/2 po  50 tablet 11    hydroCHLOROthiazide (HYDRODIURIL) 12.5 MG tablet Take 1 tablet by mouth daily      metoprolol succinate (TOPROL XL) 100 MG extended release tablet Take 150 mg by mouth 2 times daily      Cyanocobalamin (B-12) 1000 MCG SUBL Place under the tongue      folic acid (FOLVITE) 348 MCG tablet Take 800 mcg by mouth daily      apixaban (ELIQUIS) 5 MG TABS tablet Take by mouth 2 times daily       No current facility-administered medications on file prior to visit.         Allergies:  Lisinopril    Review of Systems Constitutional: Negative for activity change, appetite change and fatigue. Respiratory: Negative for apnea, cough, chest tightness and shortness of breath. Cardiovascular: Negative for chest pain, palpitations and leg swelling. Gastrointestinal: Negative for abdominal pain, blood in stool, constipation, diarrhea, nausea and vomiting. Musculoskeletal: Positive for arthralgias. Negative for back pain, gait problem and joint swelling. Neurological: Negative for seizures and headaches. Psychiatric/Behavioral: Negative for hallucinations and suicidal ideas. Objective:   /88 (Site: Left Upper Arm, Position: Sitting, Cuff Size: Large Adult)   Pulse 92   Temp 96.5 °F (35.8 °C) (Temporal)   Resp 18   Ht 5' 6\" (1.676 m)   Wt (!) 330 lb (149.7 kg)   SpO2 95%   BMI 53.26 kg/m²     Physical Exam  Vitals signs and nursing note reviewed. Constitutional:       General: She is not in acute distress. Appearance: Normal appearance. She is well-developed. She is obese. She is not diaphoretic. HENT:      Head: Normocephalic and atraumatic. Nose: Nose normal.      Mouth/Throat:      Mouth: Mucous membranes are moist.      Pharynx: Oropharynx is clear. Eyes:      Conjunctiva/sclera: Conjunctivae normal.      Pupils: Pupils are equal, round, and reactive to light. Neck:      Musculoskeletal: Normal range of motion. Cardiovascular:      Rate and Rhythm: Normal rate and regular rhythm. Heart sounds: Normal heart sounds. No murmur. No friction rub. No gallop. Pulmonary:      Effort: Pulmonary effort is normal. No respiratory distress. Breath sounds: Normal breath sounds. No wheezing or rales. Chest:      Chest wall: No tenderness. Abdominal:      General: Abdomen is flat. Bowel sounds are normal.      Palpations: Abdomen is soft. Tenderness: There is no abdominal tenderness. Musculoskeletal:        Feet:    Skin:     General: Skin is warm and dry.    Neurological:

## 2021-04-05 NOTE — PATIENT INSTRUCTIONS
Patient Education        Achilles Tendon: Exercises  Introduction  Here are some examples of exercises for you to try. The exercises may be suggested for a condition or for rehabilitation. Start each exercise slowly. Ease off the exercises if you start to have pain. You will be told when to start these exercises and which ones will work best for you. How to do the exercises  Toe stretch   1. Sit in a chair, and extend your affected leg so that your heel is on the floor. 2. With your hand, reach down and pull your big toe up and back. Pull toward your ankle and away from the floor. 3. Hold the position for at least 15 to 30 seconds. 4. Repeat 2 to 4 times a session, several times a day. Calf-plantar fascia stretch   1. Sit with your legs extended and knees straight. 2. Place a towel around your foot just under the toes. 3. Hold each end of the towel in each hand, with your hands above your knees. 4. Pull back with the towel so that your foot stretches toward you. 5. Hold the position for at least 15 to 30 seconds. 6. Repeat 2 to 4 times a session, up to 5 sessions a day. Floor stretch   1. Stand about 2 feet from a wall, and place your hands on the wall at about shoulder height. Or you can stand behind a chair, placing your hands on the back of it for balance. 2. Step back with the leg you want to stretch. Keep the leg straight, and press your heel into the floor with your toe turned slightly in.  3. Lean forward, and bend your other leg slightly. Feel the stretch in the Achilles tendon of your back leg. Hold for at least 15 to 30 seconds. 4. Repeat 2 to 4 times a session, up to 5 sessions a day. Stair stretch   1. Stand with the balls of both feet on the edge of a step or curb (or a medium-sized phone book). With at least one hand, hold onto something solid for balance, such as a banister or handrail.   2. Keeping your affected leg straight, slowly let that heel hang down off of the step or curb

## 2021-04-06 ASSESSMENT — ENCOUNTER SYMPTOMS: BACK PAIN: 0

## 2021-04-07 ENCOUNTER — OFFICE VISIT (OUTPATIENT)
Dept: ENDOCRINOLOGY | Age: 56
End: 2021-04-07
Payer: COMMERCIAL

## 2021-04-07 VITALS
HEIGHT: 66 IN | OXYGEN SATURATION: 93 % | BODY MASS INDEX: 47.09 KG/M2 | SYSTOLIC BLOOD PRESSURE: 136 MMHG | DIASTOLIC BLOOD PRESSURE: 96 MMHG | WEIGHT: 293 LBS | HEART RATE: 83 BPM

## 2021-04-07 DIAGNOSIS — C73 PAPILLARY THYROID CARCINOMA (HCC): ICD-10-CM

## 2021-04-07 DIAGNOSIS — E03.9 HYPOTHYROIDISM, UNSPECIFIED TYPE: Primary | ICD-10-CM

## 2021-04-07 DIAGNOSIS — E89.0 POSTOPERATIVE HYPOTHYROIDISM: ICD-10-CM

## 2021-04-07 DIAGNOSIS — R73.9 HYPERGLYCEMIA: ICD-10-CM

## 2021-04-07 PROCEDURE — 99213 OFFICE O/P EST LOW 20 MIN: CPT | Performed by: INTERNAL MEDICINE

## 2021-04-22 ENCOUNTER — HOSPITAL ENCOUNTER (OUTPATIENT)
Dept: NUCLEAR MEDICINE | Age: 56
Discharge: HOME | End: 2021-04-24
Payer: COMMERCIAL

## 2021-04-22 ENCOUNTER — HOSPITAL ENCOUNTER (OUTPATIENT)
Dept: NON INVASIVE DIAGNOSTICS | Age: 56
Discharge: HOME | End: 2021-04-22
Payer: COMMERCIAL

## 2021-04-22 DIAGNOSIS — R06.09 DOE (DYSPNEA ON EXERTION): ICD-10-CM

## 2021-04-22 PROCEDURE — 93016 CV STRESS TEST SUPVJ ONLY: CPT | Performed by: INTERNAL MEDICINE

## 2021-04-22 PROCEDURE — 3430000000 HC RX DIAGNOSTIC RADIOPHARMACEUTICAL: Performed by: INTERNAL MEDICINE

## 2021-04-22 PROCEDURE — 2580000003 HC RX 258: Performed by: INTERNAL MEDICINE

## 2021-04-22 PROCEDURE — 78452 HT MUSCLE IMAGE SPECT MULT: CPT | Performed by: INTERNAL MEDICINE

## 2021-04-22 PROCEDURE — 93017 CV STRESS TEST TRACING ONLY: CPT

## 2021-04-22 PROCEDURE — A9502 TC99M TETROFOSMIN: HCPCS | Performed by: INTERNAL MEDICINE

## 2021-04-22 PROCEDURE — 78452 HT MUSCLE IMAGE SPECT MULT: CPT

## 2021-04-22 PROCEDURE — 93018 CV STRESS TEST I&R ONLY: CPT | Performed by: INTERNAL MEDICINE

## 2021-04-22 PROCEDURE — 6360000002 HC RX W HCPCS: Performed by: INTERNAL MEDICINE

## 2021-04-22 RX ORDER — SODIUM CHLORIDE 0.9 % (FLUSH) 0.9 %
10 SYRINGE (ML) INJECTION PRN
Status: DISCONTINUED | OUTPATIENT
Start: 2021-04-22 | End: 2021-04-25 | Stop reason: HOSPADM

## 2021-04-22 RX ADMIN — REGADENOSON 0.4 MG: 0.08 INJECTION, SOLUTION INTRAVENOUS at 10:38

## 2021-04-22 RX ADMIN — TETROFOSMIN 36 MILLICURIE: 1.38 INJECTION, POWDER, LYOPHILIZED, FOR SOLUTION INTRAVENOUS at 10:38

## 2021-04-22 RX ADMIN — SODIUM CHLORIDE, PRESERVATIVE FREE 10 ML: 5 INJECTION INTRAVENOUS at 10:38

## 2021-04-22 NOTE — PROGRESS NOTES
Reviewed history, allergies, and medications. Consent confirmed. Lexiscan exam explained. Placed patient on monitor. @ [facility] here to inject [place]. SOB noted during recovery phase. Denied chest pain. No ectopy noted. Patient off monitor and instructed to eat, will have last part of exam in 1 hour.

## 2021-04-23 ENCOUNTER — HOSPITAL ENCOUNTER (OUTPATIENT)
Dept: NUCLEAR MEDICINE | Age: 56
Discharge: HOME | End: 2021-04-25
Payer: COMMERCIAL

## 2021-04-23 LAB
LV EF: 66 %
LVEF MODALITY: NORMAL

## 2021-04-23 PROCEDURE — 3430000000 HC RX DIAGNOSTIC RADIOPHARMACEUTICAL: Performed by: INTERNAL MEDICINE

## 2021-04-23 PROCEDURE — A9502 TC99M TETROFOSMIN: HCPCS | Performed by: INTERNAL MEDICINE

## 2021-04-23 RX ADMIN — TETROFOSMIN 31.9 MILLICURIE: 1.38 INJECTION, POWDER, LYOPHILIZED, FOR SOLUTION INTRAVENOUS at 09:45

## 2021-05-03 ENCOUNTER — OFFICE VISIT (OUTPATIENT)
Dept: CARDIOLOGY CLINIC | Age: 56
End: 2021-05-03
Payer: COMMERCIAL

## 2021-05-03 ENCOUNTER — PREP FOR PROCEDURE (OUTPATIENT)
Dept: CARDIOLOGY CLINIC | Age: 56
End: 2021-05-03

## 2021-05-03 VITALS
OXYGEN SATURATION: 95 % | DIASTOLIC BLOOD PRESSURE: 89 MMHG | BODY MASS INDEX: 47.09 KG/M2 | HEART RATE: 86 BPM | SYSTOLIC BLOOD PRESSURE: 131 MMHG | HEIGHT: 66 IN | WEIGHT: 293 LBS | RESPIRATION RATE: 18 BRPM

## 2021-05-03 DIAGNOSIS — I10 ESSENTIAL HYPERTENSION: ICD-10-CM

## 2021-05-03 DIAGNOSIS — I26.02 SADDLE EMBOLUS OF PULMONARY ARTERY WITH ACUTE COR PULMONALE, UNSPECIFIED CHRONICITY (HCC): ICD-10-CM

## 2021-05-03 DIAGNOSIS — I48.0 PAF (PAROXYSMAL ATRIAL FIBRILLATION) (HCC): Primary | ICD-10-CM

## 2021-05-03 DIAGNOSIS — I82.533 CHRONIC DEEP VEIN THROMBOSIS (DVT) OF POPLITEAL VEIN OF BOTH LOWER EXTREMITIES (HCC): ICD-10-CM

## 2021-05-03 DIAGNOSIS — R06.09 DOE (DYSPNEA ON EXERTION): ICD-10-CM

## 2021-05-03 LAB
ANION GAP SERPL CALCULATED.3IONS-SCNC: 9 MEQ/L (ref 9–15)
BUN BLDV-MCNC: 25 MG/DL (ref 6–20)
CALCIUM SERPL-MCNC: 9.9 MG/DL (ref 8.5–9.9)
CHLORIDE BLD-SCNC: 102 MEQ/L (ref 95–107)
CO2: 31 MEQ/L (ref 20–31)
CREAT SERPL-MCNC: 1.4 MG/DL (ref 0.5–0.9)
GFR AFRICAN AMERICAN: 47.2
GFR NON-AFRICAN AMERICAN: 39
GLUCOSE BLD-MCNC: 87 MG/DL (ref 70–99)
HCT VFR BLD CALC: 52.1 % (ref 37–47)
HEMOGLOBIN: 17.4 G/DL (ref 12–16)
MCH RBC QN AUTO: 32.3 PG (ref 27–31.3)
MCHC RBC AUTO-ENTMCNC: 33.3 % (ref 33–37)
MCV RBC AUTO: 96.9 FL (ref 82–100)
PDW BLD-RTO: 14.5 % (ref 11.5–14.5)
PLATELET # BLD: 238 K/UL (ref 130–400)
POTASSIUM SERPL-SCNC: 4.6 MEQ/L (ref 3.4–4.9)
RBC # BLD: 5.38 M/UL (ref 4.2–5.4)
SODIUM BLD-SCNC: 142 MEQ/L (ref 135–144)
WBC # BLD: 7.1 K/UL (ref 4.8–10.8)

## 2021-05-03 PROCEDURE — 99215 OFFICE O/P EST HI 40 MIN: CPT | Performed by: INTERNAL MEDICINE

## 2021-05-03 RX ORDER — SODIUM CHLORIDE 9 MG/ML
25 INJECTION, SOLUTION INTRAVENOUS PRN
Status: CANCELLED | OUTPATIENT
Start: 2021-05-03

## 2021-05-03 RX ORDER — SODIUM CHLORIDE 0.9 % (FLUSH) 0.9 %
5-40 SYRINGE (ML) INJECTION EVERY 12 HOURS SCHEDULED
Status: CANCELLED | OUTPATIENT
Start: 2021-05-03

## 2021-05-03 RX ORDER — SODIUM CHLORIDE 450 MG/100ML
75 INJECTION, SOLUTION INTRAVENOUS CONTINUOUS
Status: CANCELLED | OUTPATIENT
Start: 2021-05-03

## 2021-05-03 RX ORDER — NITROGLYCERIN 0.4 MG/1
0.4 TABLET SUBLINGUAL EVERY 5 MIN PRN
Status: CANCELLED | OUTPATIENT
Start: 2021-05-03

## 2021-05-03 RX ORDER — SODIUM CHLORIDE 0.9 % (FLUSH) 0.9 %
5-40 SYRINGE (ML) INJECTION PRN
Status: CANCELLED | OUTPATIENT
Start: 2021-05-03

## 2021-05-03 RX ORDER — DIPHENHYDRAMINE HYDROCHLORIDE 50 MG/ML
50 INJECTION INTRAMUSCULAR; INTRAVENOUS ONCE
Status: CANCELLED | OUTPATIENT
Start: 2021-05-03 | End: 2021-05-03

## 2021-05-03 RX ORDER — ONDANSETRON 2 MG/ML
4 INJECTION INTRAMUSCULAR; INTRAVENOUS EVERY 6 HOURS PRN
Status: CANCELLED | OUTPATIENT
Start: 2021-05-03

## 2021-05-03 ASSESSMENT — ENCOUNTER SYMPTOMS
CHEST TIGHTNESS: 0
GASTROINTESTINAL NEGATIVE: 1
SHORTNESS OF BREATH: 1
COUGH: 0
BLOOD IN STOOL: 0
STRIDOR: 0
WHEEZING: 0
NAUSEA: 0
EYES NEGATIVE: 1

## 2021-05-03 NOTE — PROGRESS NOTES
OFFICE VISIT         Patient: Asher Busch  YOB: 1965  MRN: [id number]    Chief Complaint: DVT PE AF TONG   Chief Complaint   Patient presents with    Results     Stress Test    Atrial Fibrillation    Hypertension    Shortness of Breath       CV Data:  11/2020 Unprovoked B/L DVT and Saddle Embolism   Thyroid cancer s/p Thyroidectomy   4/21 SPECT abn anterior     Subjective/HPI pt is SOB with any ADLs. No cp currently. Compliant with all meds. No bleed. No falls. haad AF since PE. Was seeing Dr. Kary Phillips but changing. There were plans for CVN and Antiarrhythmic    3/23/21 still winded with ADLs. No pain. Went back into AF. No cp tired. 5/3/21 still TONG no cp no bleed.  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  Former smoker  No ETOH  Lives with  and kids  ++FH     EKG: AF 80 QTc 477 ms    Past Medical History:   Diagnosis Date    Atrial fibrillation ([address]     DVT (deep vein thrombosis) in pregnancy     Hypertension     Ventricular tachycardia (HCC)        Past Surgical History:   Procedure Laterality Date    APPENDECTOMY      CARDIOVERSION  03/16/2021    GALLBLADDER SURGERY      THYROIDECTOMY      TONSILLECTOMY AND ADENOIDECTOMY      TUBAL LIGATION         Family History   Problem Relation Age of Onset    Stroke Mother         CVA    Diabetes Mother     Heart Disease Father     Heart Failure Sister        Social History     Socioeconomic History    Marital status:      Spouse name: None    Number of children: None    Years of education: None    Highest education level: None   Occupational History    None   Social Needs    Financial resource strain: Not hard at all   [place] insecurity     Worry: Never true     Inability: Never true    Transportation needs     Medical: No     Non-medical: No   Tobacco Use    Smoking status: Former Smoker     Packs/day: 1.50     Years: 35.00     Pack years: 52.50     Types: Cigarettes     Start date: 1984     Quit date: 2014     Years since quittin.3    Smokeless tobacco: Never Used   Substance and Sexual Activity    Alcohol use: Not Currently    Drug use: None    Sexual activity: None   Lifestyle    Physical activity     Days per week: None     Minutes per session: None    Stress: None   Relationships    Social connections     Talks on phone: None     Gets together: None     Attends [employer] service: None     Active member of club or organization: None     Attends meetings of clubs or organizations: None     Relationship status: None    Intimate partner violence     Fear of current or ex partner: None     Emotionally abused: None     Physically abused: None     Forced sexual activity: None   Other Topics Concern    None   Social History Narrative    None       Allergies   Allergen Reactions    Lisinopril Other (See Comments)       Current Outpatient Medications   Medication Sig Dispense Refill    pyridoxine (B-6) 100 MG tablet Take 50 mg by mouth daily      levothyroxine (SYNTHROID) 200 MCG tablet 1 po 6 days weeks and 1 1/2 po  50 tablet 11    hydroCHLOROthiazide (HYDRODIURIL) 12.5 MG tablet Take 1 tablet by mouth daily      metoprolol succinate (TOPROL XL) 100 MG extended release tablet Take 150 mg by mouth 2 times daily      Cyanocobalamin (B-12) 1000 MCG SUBL Place under the tongue      folic acid (FOLVITE) 792 MCG tablet Take 800 mcg by mouth daily      apixaban (ELIQUIS) 5 MG TABS tablet Take by mouth 2 times daily       No current facility-administered medications for this visit. Review of Systems:   Review of Systems   Constitutional: Negative. Negative for diaphoresis and fatigue. HENT: Negative. Eyes: Negative. Respiratory: Positive for shortness of breath. Negative for cough, chest tightness, wheezing and stridor. Cardiovascular: Negative. Negative for chest pain, palpitations and leg swelling. Gastrointestinal: Negative. Negative for blood in stool and nausea. Genitourinary: Negative. Musculoskeletal: Negative. Skin: Negative. Neurological: Negative. Negative for dizziness, syncope, weakness and light-headedness. Hematological: Negative. Psychiatric/Behavioral: Negative. Physical Examination:    /89 (Site: Left Upper Arm, Position: Sitting, Cuff Size: Large Adult)   Pulse 86   Resp 18   Ht 5' 6\" (1.676 m)   Wt (!) 321 lb (145.6 kg)   SpO2 95%   BMI 51.81 kg/m²    Physical Exam   Constitutional: She appears healthy. No distress. HENT:   Normal cephalic and Atraumatic   Eyes: Pupils are equal, round, and reactive to light. Neck: Normal range of motion and thyroid normal. Neck supple. No JVD present. No neck adenopathy. No thyromegaly present. Cardiovascular: Normal rate, intact distal pulses and normal pulses. An irregularly irregular rhythm present. Murmur heard. Pulmonary/Chest: Effort normal and breath sounds normal. She has no wheezes. She has no rales. She exhibits no tenderness. Abdominal: Soft. Bowel sounds are normal. There is no abdominal tenderness. Musculoskeletal: Normal range of motion. General: No tenderness or edema. Neurological: She is alert and oriented to person, place, and time. Skin: Skin is warm. No cyanosis. Nails show no clubbing.        LABS:  CBC:   Lab Results   Component Value Date    WBC 7.7 03/10/2021    RBC 5.39 03/10/2021    HGB 17.0 03/10/2021    HCT 51.8 03/10/2021    MCV 96.0 03/10/2021    MCH 31.5 03/10/2021    MCHC 32.8 03/10/2021    RDW 15.1 03/10/2021     03/10/2021     Lipids:No results found for: CHOL  No results found for: TRIG  No results found for: HDL  No results found for: LDLCHOLESTEROL, LDLCALC  No results found for: LABVLDL, VLDL  No results found for: CHOLHDLRATIO  CMP:    Lab Results   Component Value Date     03/10/2021    K 4.2 03/10/2021     03/10/2021    CO2 25 03/10/2021    BUN 19 03/10/2021    CREATININE 1.08 03/10/2021    GFRAA >60.0 03/10/2021    LABGLOM 52.7 03/10/2021    GLUCOSE 112 03/10/2021    CALCIUM 9.6 03/10/2021     BMP:    Lab Results   Component Value Date     03/10/2021    K 4.2 03/10/2021     03/10/2021    CO2 25 03/10/2021    BUN 19 03/10/2021    CREATININE 1.08 03/10/2021    CALCIUM 9.6 03/10/2021    GFRAA >60.0 03/10/2021    LABGLOM 52.7 03/10/2021    GLUCOSE 112 03/10/2021     Magnesium:    Lab Results   Component Value Date    MG 2.1 03/10/2021     TSH:No results found for: TSHFT4, TSH          Patient Active Problem List   Diagnosis    Postoperative hypothyroidism    Papillary thyroid carcinoma (HCC)    PAF (paroxysmal atrial fibrillation) (HCC)       There are no discontinued medications. Modified Medications    No medications on file       No orders of the defined types were placed in this encounter. Assessment/Plan:    1. Essential hypertension   stable    2. Atrial fibrillation, unspecified type ([address]   NOAC. 3. 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 Chronic deep vein thrombosis (DVT) of popliteal vein of both lower extremities (HCC)       5. TONG (dyspnea on exertion) - spect. If negative will plan for Sotalol load. --- spect abn - RBA LHC/PCI discussed. Add ASA          Counseling:  Heart Healthy Lifestyle, Improve BMI, Low Salt Diet, Take Precautions to Prevent Falls and Walk Daily    Return for Schedule Cath.     Electronically signed by Guzman Perea MD on 5/3/2021 at 2:22 PM

## 2021-05-12 ENCOUNTER — HOSPITAL ENCOUNTER (OUTPATIENT)
Dept: CARDIAC CATH/INVASIVE PROCEDURES | Age: 56
Discharge: HOME | End: 2021-05-12
Attending: INTERNAL MEDICINE | Admitting: INTERNAL MEDICINE
Payer: COMMERCIAL

## 2021-05-12 VITALS
BODY MASS INDEX: 47.09 KG/M2 | WEIGHT: 293 LBS | SYSTOLIC BLOOD PRESSURE: 118 MMHG | HEART RATE: 78 BPM | OXYGEN SATURATION: 93 % | HEIGHT: 66 IN | DIASTOLIC BLOOD PRESSURE: 77 MMHG | TEMPERATURE: 96.7 F | RESPIRATION RATE: 21 BRPM

## 2021-05-12 LAB
PERFORMED ON: ABNORMAL
POC ACTIVATED CLOTTING TIME KAOLIN: 213 SEC (ref 82–152)
POC SAMPLE TYPE: ABNORMAL

## 2021-05-12 PROCEDURE — 2709999900 HC NON-CHARGEABLE SUPPLY

## 2021-05-12 PROCEDURE — C1894 INTRO/SHEATH, NON-LASER: HCPCS

## 2021-05-12 PROCEDURE — C1769 GUIDE WIRE: HCPCS

## 2021-05-12 PROCEDURE — 93005 ELECTROCARDIOGRAM TRACING: CPT | Performed by: INTERNAL MEDICINE

## 2021-05-12 PROCEDURE — 93799 UNLISTED CV SVC/PROCEDURE: CPT | Performed by: INTERNAL MEDICINE

## 2021-05-12 PROCEDURE — C1887 CATHETER, GUIDING: HCPCS

## 2021-05-12 PROCEDURE — 2500000003 HC RX 250 WO HCPCS

## 2021-05-12 PROCEDURE — 2580000003 HC RX 258

## 2021-05-12 PROCEDURE — 93458 L HRT ARTERY/VENTRICLE ANGIO: CPT | Performed by: INTERNAL MEDICINE

## 2021-05-12 PROCEDURE — 93571 IV DOP VEL&/PRESS C FLO 1ST: CPT | Performed by: INTERNAL MEDICINE

## 2021-05-12 PROCEDURE — 85347 COAGULATION TIME ACTIVATED: CPT

## 2021-05-12 PROCEDURE — 6360000002 HC RX W HCPCS

## 2021-05-12 PROCEDURE — 6360000004 HC RX CONTRAST MEDICATION: Performed by: INTERNAL MEDICINE

## 2021-05-12 RX ORDER — SODIUM CHLORIDE 9 MG/ML
25 INJECTION, SOLUTION INTRAVENOUS PRN
Status: DISCONTINUED | OUTPATIENT
Start: 2021-05-12 | End: 2021-05-12 | Stop reason: HOSPADM

## 2021-05-12 RX ORDER — SODIUM CHLORIDE 0.9 % (FLUSH) 0.9 %
5-40 SYRINGE (ML) INJECTION EVERY 12 HOURS SCHEDULED
Status: DISCONTINUED | OUTPATIENT
Start: 2021-05-12 | End: 2021-05-12 | Stop reason: HOSPADM

## 2021-05-12 RX ORDER — ASPIRIN 81 MG/1
81 TABLET ORAL DAILY
COMMUNITY

## 2021-05-12 RX ORDER — NITROGLYCERIN 0.4 MG/1
0.4 TABLET SUBLINGUAL EVERY 5 MIN PRN
Status: DISCONTINUED | OUTPATIENT
Start: 2021-05-12 | End: 2021-05-12 | Stop reason: HOSPADM

## 2021-05-12 RX ORDER — SODIUM CHLORIDE 0.9 % (FLUSH) 0.9 %
5-40 SYRINGE (ML) INJECTION PRN
Status: DISCONTINUED | OUTPATIENT
Start: 2021-05-12 | End: 2021-05-12 | Stop reason: HOSPADM

## 2021-05-12 RX ORDER — SODIUM CHLORIDE 450 MG/100ML
75 INJECTION, SOLUTION INTRAVENOUS CONTINUOUS
Status: DISCONTINUED | OUTPATIENT
Start: 2021-05-12 | End: 2021-05-12 | Stop reason: HOSPADM

## 2021-05-12 RX ORDER — ONDANSETRON 2 MG/ML
4 INJECTION INTRAMUSCULAR; INTRAVENOUS EVERY 6 HOURS PRN
Status: DISCONTINUED | OUTPATIENT
Start: 2021-05-12 | End: 2021-05-12 | Stop reason: HOSPADM

## 2021-05-12 RX ORDER — HYDRALAZINE HYDROCHLORIDE 20 MG/ML
10 INJECTION INTRAMUSCULAR; INTRAVENOUS EVERY 10 MIN PRN
Status: DISCONTINUED | OUTPATIENT
Start: 2021-05-12 | End: 2021-05-12 | Stop reason: HOSPADM

## 2021-05-12 RX ORDER — ACETAMINOPHEN 325 MG/1
650 TABLET ORAL EVERY 4 HOURS PRN
Status: DISCONTINUED | OUTPATIENT
Start: 2021-05-12 | End: 2021-05-12 | Stop reason: HOSPADM

## 2021-05-12 RX ORDER — LABETALOL HYDROCHLORIDE 5 MG/ML
10 INJECTION, SOLUTION INTRAVENOUS EVERY 30 MIN PRN
Status: DISCONTINUED | OUTPATIENT
Start: 2021-05-12 | End: 2021-05-12 | Stop reason: HOSPADM

## 2021-05-12 RX ORDER — DIPHENHYDRAMINE HYDROCHLORIDE 50 MG/ML
50 INJECTION INTRAMUSCULAR; INTRAVENOUS ONCE
Status: DISCONTINUED | OUTPATIENT
Start: 2021-05-12 | End: 2021-05-12 | Stop reason: HOSPADM

## 2021-05-12 RX ORDER — SODIUM CHLORIDE 9 MG/ML
INJECTION, SOLUTION INTRAVENOUS CONTINUOUS
Status: DISCONTINUED | OUTPATIENT
Start: 2021-05-12 | End: 2021-05-12 | Stop reason: HOSPADM

## 2021-05-12 RX ADMIN — IOPAMIDOL 120 ML: 612 INJECTION, SOLUTION INTRAVENOUS at 09:49

## 2021-05-12 NOTE — PROGRESS NOTES
Pt up to bathroom, no bleeding or hematoma. R wrist and R groin remains stable. IV removed. Pt up getting dressed for discharge to home.

## 2021-05-12 NOTE — PROGRESS NOTES
Arrived to pre/post cath, alert and oriented. Vital signs obtained. Consent for procedure signed. Prepping pt for procedure.

## 2021-05-12 NOTE — PROGRESS NOTES
4cc air removed from vasc-band, no bleeding or hematoma. R groin remains stable. Will continue to monitor.

## 2021-05-12 NOTE — PROGRESS NOTES
Discharge instructions reviewed with pt, verbalized understanding. Pt discharge to home. Daughter arrived to drive pt home.

## 2021-05-12 NOTE — PROCEDURES
[address]                      [address], [address] [facility]                                 PROCEDURE NOTE    PATIENT NAME: Liam Rico                   :        1965  MED REC NO:   [id number]                            ROOM:  ACCOUNT NO:   [de-identified]                           ADMIT DATE: 2021  PROVIDER:     Yuliana Amaya DO    DATE OF PROCEDURE:  2021    PROCEDURE PERFORMED:  IFR of the LAD. PROCEDURE PERFORMED BY:  Eduar Amaya DO    COMPLICATIONS:  None. ACCESS SITE:  Right radial artery. DESCRIPTION OF PROCEDURE:  After diagnostic images were obtained by  General Cardiology, I was asked to evaluate the LAD stenosis in the  proximal segment. Next, a 6-Martiniquais Guide was engaged in the left main  coronary artery and an 0.014 _____ [employer] iFR wire was utilized  to perform the iFR of the LAD per usual technique with a result of 0.2  to 0.94. Therefore, the lesion was treated medically. All wires and  catheters were removed from the patient. The patient was transferred to  the post-cath holding area in stable and satisfactory condition. Prior  to that, a VASCADE closure device was applied in the right common  femoral artery. Heparin 5000 units was given prior to iFR. ASSESSMENT:  1.  Negative iFR of the LAD. 2.  Proximal stenosis of 50% to 60% equals 0.92 to 0.94. PLAN:  1. Medical therapy/risk factor modification.   2.  The patient to follow up with Dr. Hedy Kumari DO    D: 2021 9:56:45       T: 2021 10:34:37     [email or web address]  Job#: [id number]     Doc#: [id number]    CC:

## 2021-05-12 NOTE — BRIEF OP NOTE
Brief Postoperative Note      Patient: Myles Mclaughlin  YOB: 1965  MRN: [id number]     Section of Cardiology  Adult Brief Cardiac Cath Procedure Note        Procedure(s):  LHC, b/l coronary angio RRA (Stiff [place] would not cros beyond [address] [place]) therfore RFA accessed. Pre-operative Diagnosis:  Abn Stress     H&P Status: Completed and reviewed. Post-operative Diagnosis:  LAD - Aneurysmal dilation with moderate sequential lesions 50-60 and  IFR negative.   EF 60    Findings:  See full report    Complications:  none    Primary Proceduralist:   Jaylin Stone MD

## 2021-05-12 NOTE — BRIEF OP NOTE
Section of Cardiology  Adult Brief Cardiac Cath Procedure Note        Procedure(s):  IFR of LAD    Pre-operative Diagnosis:  Abn stress test    H&P Status: Completed and reviewed.      Post-operative Diagnosis:      IFR of LAD =0.92-0.94    Findings:  See full report    Complications:  none    Primary Proceduralist:   Dr.Wes Amaya DO    Plan    MEd rx  rfm        Full procedure note to follow

## 2021-05-13 LAB
EKG ATRIAL RATE: 80 BPM
EKG Q-T INTERVAL: 392 MS
EKG QRS DURATION: 86 MS
EKG QTC CALCULATION (BAZETT): 452 MS
EKG R AXIS: 23 DEGREES
EKG T AXIS: 19 DEGREES
EKG VENTRICULAR RATE: 80 BPM

## 2021-05-13 PROCEDURE — 93010 ELECTROCARDIOGRAM REPORT: CPT | Performed by: INTERNAL MEDICINE

## 2021-05-27 ENCOUNTER — HOSPITAL ENCOUNTER (OUTPATIENT)
Dept: LAB | Age: 56
Discharge: HOME | End: 2021-05-27
Payer: COMMERCIAL

## 2021-05-27 DIAGNOSIS — N18.31 STAGE 3A CHRONIC KIDNEY DISEASE (HCC): ICD-10-CM

## 2021-05-27 DIAGNOSIS — R79.89 ELEVATED LFTS: ICD-10-CM

## 2021-05-27 LAB
ALBUMIN SERPL-MCNC: 3.5 G/DL (ref 3.5–4.6)
ALP BLD-CCNC: 88 U/L (ref 40–130)
ALT SERPL-CCNC: 23 U/L (ref 0–33)
ANION GAP SERPL CALCULATED.3IONS-SCNC: 9 MEQ/L (ref 9–15)
AST SERPL-CCNC: 30 U/L (ref 0–35)
BILIRUB SERPL-MCNC: 0.5 MG/DL (ref 0.2–0.7)
BUN BLDV-MCNC: 23 MG/DL (ref 6–20)
CALCIUM SERPL-MCNC: 9.9 MG/DL (ref 8.5–9.9)
CHLORIDE BLD-SCNC: 104 MEQ/L (ref 95–107)
CO2: 28 MEQ/L (ref 20–31)
CREAT SERPL-MCNC: 1.26 MG/DL (ref 0.5–0.9)
CREATININE URINE: 30.4 MG/DL
GFR AFRICAN AMERICAN: 53.3
GFR NON-AFRICAN AMERICAN: 44.1
GLOBULIN: 3.6 G/DL (ref 2.3–3.5)
GLUCOSE BLD-MCNC: 89 MG/DL (ref 70–99)
MICROALBUMIN UR-MCNC: 32.6 MG/DL
MICROALBUMIN/CREAT UR-RTO: 1072.4 MG/G (ref 0–30)
POTASSIUM SERPL-SCNC: 4.1 MEQ/L (ref 3.4–4.9)
SODIUM BLD-SCNC: 141 MEQ/L (ref 135–144)
TOTAL PROTEIN: 7.1 G/DL (ref 6.3–8)

## 2021-05-27 PROCEDURE — 82570 ASSAY OF URINE CREATININE: CPT

## 2021-05-27 PROCEDURE — 82043 UR ALBUMIN QUANTITATIVE: CPT

## 2021-05-27 PROCEDURE — 36415 COLL VENOUS BLD VENIPUNCTURE: CPT

## 2021-05-27 PROCEDURE — 80053 COMPREHEN METABOLIC PANEL: CPT

## 2021-05-28 DIAGNOSIS — R80.9 MICROALBUMINURIA: Primary | ICD-10-CM

## 2021-05-28 DIAGNOSIS — N18.31 STAGE 3A CHRONIC KIDNEY DISEASE (HCC): ICD-10-CM

## 2021-06-02 ENCOUNTER — OFFICE VISIT (OUTPATIENT)
Dept: FAMILY MEDICINE CLINIC | Age: 56
End: 2021-06-02
Payer: COMMERCIAL

## 2021-06-02 VITALS
HEIGHT: 66 IN | TEMPERATURE: 97 F | HEART RATE: 83 BPM | OXYGEN SATURATION: 96 % | SYSTOLIC BLOOD PRESSURE: 128 MMHG | DIASTOLIC BLOOD PRESSURE: 78 MMHG | BODY MASS INDEX: 47.09 KG/M2 | WEIGHT: 293 LBS

## 2021-06-02 DIAGNOSIS — E89.0 POSTOPERATIVE HYPOTHYROIDISM: ICD-10-CM

## 2021-06-02 DIAGNOSIS — I48.0 PAF (PAROXYSMAL ATRIAL FIBRILLATION) (HCC): ICD-10-CM

## 2021-06-02 DIAGNOSIS — D75.1 POLYCYTHEMIA: Primary | ICD-10-CM

## 2021-06-02 DIAGNOSIS — N18.31 STAGE 3A CHRONIC KIDNEY DISEASE (HCC): ICD-10-CM

## 2021-06-02 DIAGNOSIS — R80.9 MICROALBUMINURIA: ICD-10-CM

## 2021-06-02 DIAGNOSIS — Z12.31 ENCOUNTER FOR SCREENING MAMMOGRAM FOR MALIGNANT NEOPLASM OF BREAST: ICD-10-CM

## 2021-06-02 DIAGNOSIS — Z87.891 PERSONAL HISTORY OF TOBACCO USE: ICD-10-CM

## 2021-06-02 DIAGNOSIS — Z12.11 COLON CANCER SCREENING: ICD-10-CM

## 2021-06-02 PROCEDURE — 99214 OFFICE O/P EST MOD 30 MIN: CPT | Performed by: FAMILY MEDICINE

## 2021-06-02 PROCEDURE — G0296 VISIT TO DETERM LDCT ELIG: HCPCS | Performed by: FAMILY MEDICINE

## 2021-06-02 RX ORDER — ASPIRIN 81 MG/1
TABLET, CHEWABLE ORAL
COMMUNITY
End: 2021-06-23 | Stop reason: SDUPTHER

## 2021-06-02 NOTE — PROGRESS NOTES
Subjective:      Patient ID: Hernandez Ross is a 55 y.o. female who presents today for:     Chief Complaint   Patient presents with    Follow-up       HPI  Patient is a very pleasant 60-year-old female presents today to follow-up on chronic concerns. Hypertension: Well-controlled. Patient denies any chest pain, shortness of breath or lower extremity edema. Patient has an upcoming appointment with cardiology. Upon review of chronic and most recent lab work. Patient has had a noted elevated hemoglobin hematocrit. She complains of pruritus and heat intolerance. This elevation has never been evaluated. Kidney function has also remained mildly diminished but stable.   Patient is currently having some financial constraints but does open to making follow-up appointments  Past Medical History:   Diagnosis Date    Atrial fibrillation ([address]     DVT (deep vein thrombosis) in pregnancy     Hypertension     Ventricular tachycardia (HCC)      Past Surgical History:   Procedure Laterality Date    APPENDECTOMY      CARDIOVERSION  2021    GALLBLADDER SURGERY      THYROIDECTOMY      TONSILLECTOMY AND ADENOIDECTOMY      TUBAL LIGATION       Family History   Problem Relation Age of Onset    Stroke Mother         CVA    Diabetes Mother     Heart Disease Father     Heart Failure Sister      Social History     Socioeconomic History    Marital status:      Spouse name: Not on file    Number of children: Not on file    Years of education: Not on file    Highest education level: Not on file   Occupational History    Not on file   Tobacco Use    Smoking status: Former Smoker     Packs/day: 1.50     Years: 35.00     Pack years: 52.50     Types: Cigarettes     Start date:      Quit date:      Years since quittin.4    Smokeless tobacco: Never Used   Substance and Sexual Activity    Alcohol use: Not Currently    Drug use: Not on file    Sexual activity: Not on file   Other Topics Concern  Not on file   Social History Narrative    Not on file     Social Determinants of Health     Financial Resource Strain: Low Risk     Difficulty of Paying Living Expenses: Not hard at all   Food Insecurity: No Food Insecurity    Worried About Running Out of Food in the Last Year: Never true    [address] in the Last Year: Never true   Transportation Needs: No Transportation Needs    Lack of Transportation (Medical): No    Lack of Transportation (Non-Medical): No   Physical Activity:     Days of Exercise per Week:     Minutes of Exercise per Session:    Stress:     Feeling of Stress :    Social Connections:     Frequency of Communication with Friends and Family:     Frequency of Social Gatherings with Friends and Family:     Attends Orthodoxy Services:     Active Member of Clubs or Organizations:     Attends Club or Organization Meetings:     Marital Status:    Intimate Partner Violence:     Fear of Current or Ex-Partner:     Emotionally Abused:     Physically Abused:     Sexually Abused:      Current Outpatient Medications on File Prior to Visit   Medication Sig Dispense Refill    aspirin 81 MG chewable tablet       aspirin 81 MG EC tablet Take 81 mg by mouth daily      pyridoxine (B-6) 100 MG tablet Take 50 mg by mouth daily      levothyroxine (SYNTHROID) 200 MCG tablet 1 po 6 days weeks and 1 1/2 po Sunday (Patient taking differently: Take 200 mcg by mouth Daily 1 po 6 days weeks and 1 1/2 po Sunday) 50 tablet 11    hydroCHLOROthiazide (HYDRODIURIL) 12.5 MG tablet Take 1 tablet by mouth daily      metoprolol succinate (TOPROL XL) 100 MG extended release tablet Take 150 mg by mouth 2 times daily      Cyanocobalamin (B-12) 1000 MCG SUBL Place under the tongue      folic acid (FOLVITE) 084 MCG tablet Take 800 mcg by mouth daily      apixaban (ELIQUIS) 5 MG TABS tablet Take by mouth 2 times daily       No current facility-administered medications on file prior to visit. Allergies:  Lisinopril    Review of Systems   Constitutional: Negative for activity change, appetite change and fatigue. Respiratory: Negative for apnea, cough, chest tightness and shortness of breath. Cardiovascular: Negative for chest pain, palpitations and leg swelling. Gastrointestinal: Negative for abdominal pain, blood in stool, constipation, diarrhea, nausea and vomiting. Musculoskeletal: Negative for arthralgias. Skin: Negative for rash and wound. Neurological: Negative for seizures and headaches. Psychiatric/Behavioral: Negative for hallucinations and suicidal ideas. Objective:   /78 (Site: Right Upper Arm, Position: Sitting, Cuff Size: Large Adult)   Pulse 83   Temp 97 °F (36.1 °C) (Temporal)   Ht 5' 6\" (1.676 m)   Wt (!) 318 lb 6.4 oz (144.4 kg)   LMP  (LMP Unknown)   SpO2 96%   Breastfeeding No   BMI 51.39 kg/m²     Physical Exam  Vitals and nursing note reviewed. Constitutional:       General: She is not in acute distress. Appearance: Normal appearance. She is well-developed. She is obese. She is not diaphoretic. HENT:      Head: Normocephalic and atraumatic. Nose: Nose normal.      Mouth/Throat:      Mouth: Mucous membranes are moist.      Pharynx: Oropharynx is clear. Eyes:      Conjunctiva/sclera: Conjunctivae normal.      Pupils: Pupils are equal, round, and reactive to light. Cardiovascular:      Rate and Rhythm: Normal rate and regular rhythm. Heart sounds: Normal heart sounds. No murmur heard. No friction rub. No gallop. Pulmonary:      Effort: Pulmonary effort is normal. No respiratory distress. Breath sounds: Normal breath sounds. No wheezing or rales. Chest:      Chest wall: No tenderness. Abdominal:      General: Abdomen is flat. Bowel sounds are normal.      Palpations: Abdomen is soft. Tenderness: There is no abdominal tenderness. Musculoskeletal:      Cervical back: Normal range of motion.    Skin: General: Skin is warm and dry. Neurological:      Mental Status: She is alert and oriented to person, place, and time. Psychiatric:         Behavior: Behavior normal.         Thought Content: Thought content normal.         Judgment: Judgment normal.         Assessment & Plan:     1. Polycythemia  We will refer to hematology for further assessment,  - Amb External Referral To Oncology    2. PAF (paroxysmal atrial fibrillation) (HCC)  Stable: Follow-up with cardiology    3. Stage 3a chronic kidney disease  We will have patient evaluated by nephrology    4. Microalbuminuria  As above    5. Postoperative hypothyroidism  Continue current dosage    6. Encounter for screening mammogram for malignant neoplasm of breast    - GUERLINE DIGITAL SCREEN W OR WO CAD BILATERAL; Future    7. Colon cancer screening  Advised colonoscopy is gold standard but patient elected Cologuard  - Cologuard; Future      8. Personal history of tobacco use  - OK VISIT TO DISCUSS LUNG CA SCREEN W LDCT  - CT Lung Screen (Annual); Future      Return in about 3 months (around 9/2/2021). Gaviota Maza MD            Low Dose CT (LDCT) Lung Screening criteria met   Age 50-69   Pack year smoking >30   Still smoking or less than 15 year since quit   No sign or symptoms of lung cancer   > 11 months since last LDCT     Risks and benefits of lung cancer screening with LDCT scans discussed:    Significance of positive screen - False-positive LDCT results often occur. 95% of all positive results do not lead to a diagnosis of cancer. Usually further imaging can resolve most false-positive results; however, some patients may require invasive procedures. Over diagnosis risk - 10% to 12% of screen-detected lung cancer cases are over diagnosed--that is, the cancer would not have been detected in the patient's lifetime without the screening.     Need for follow up screens annually to continue lung cancer screening effectiveness     Risks associated with radiation from annual LDCT- Radiation exposure is about the same as for a mammogram, which is about 1/3 of the annual background radiation exposure from everyday life. Starting screening at age 54 is not likely to increase cancer risk from radiation exposure. Patients with comorbidities resulting in life expectancy of < 10 years, or that would preclude treatment of an abnormality identified on CT, should not be screened due to lack of benefit.     To obtain maximal benefit from this screening, smoking cessation and long-term abstinence from smoking is critical

## 2021-06-04 ASSESSMENT — ENCOUNTER SYMPTOMS
CONSTIPATION: 0
CHEST TIGHTNESS: 0
VOMITING: 0
SHORTNESS OF BREATH: 0
BLOOD IN STOOL: 0
ABDOMINAL PAIN: 0
APNEA: 0
COUGH: 0
NAUSEA: 0
DIARRHEA: 0

## 2021-06-14 ENCOUNTER — HOSPITAL ENCOUNTER (OUTPATIENT)
Dept: ULTRASOUND IMAGING | Age: 56
Discharge: HOME | End: 2021-06-16
Payer: COMMERCIAL

## 2021-06-14 ENCOUNTER — HOSPITAL ENCOUNTER (OUTPATIENT)
Dept: LAB | Age: 56
Discharge: HOME | End: 2021-06-14
Payer: COMMERCIAL

## 2021-06-14 DIAGNOSIS — N18.31 STAGE 3A CHRONIC KIDNEY DISEASE (HCC): ICD-10-CM

## 2021-06-14 PROCEDURE — 76775 US EXAM ABDO BACK WALL LIM: CPT

## 2021-06-17 ENCOUNTER — TELEPHONE (OUTPATIENT)
Dept: ENDOSCOPY | Age: 56
End: 2021-06-17

## 2021-06-17 DIAGNOSIS — R19.5 POSITIVE COLORECTAL CANCER SCREENING USING COLOGUARD TEST: Primary | ICD-10-CM

## 2021-06-23 ENCOUNTER — OFFICE VISIT (OUTPATIENT)
Dept: CARDIOLOGY CLINIC | Age: 56
End: 2021-06-23
Payer: COMMERCIAL

## 2021-06-23 VITALS
HEIGHT: 66 IN | HEART RATE: 62 BPM | SYSTOLIC BLOOD PRESSURE: 132 MMHG | WEIGHT: 293 LBS | BODY MASS INDEX: 47.09 KG/M2 | RESPIRATION RATE: 18 BRPM | OXYGEN SATURATION: 96 % | DIASTOLIC BLOOD PRESSURE: 82 MMHG

## 2021-06-23 DIAGNOSIS — I25.10 CORONARY ARTERY DISEASE INVOLVING NATIVE CORONARY ARTERY OF NATIVE HEART WITHOUT ANGINA PECTORIS: ICD-10-CM

## 2021-06-23 DIAGNOSIS — I10 ESSENTIAL HYPERTENSION: ICD-10-CM

## 2021-06-23 DIAGNOSIS — E78.5 DYSLIPIDEMIA: ICD-10-CM

## 2021-06-23 DIAGNOSIS — R06.09 DOE (DYSPNEA ON EXERTION): ICD-10-CM

## 2021-06-23 DIAGNOSIS — I48.0 PAF (PAROXYSMAL ATRIAL FIBRILLATION) (HCC): Primary | ICD-10-CM

## 2021-06-23 PROCEDURE — 99214 OFFICE O/P EST MOD 30 MIN: CPT | Performed by: INTERNAL MEDICINE

## 2021-06-23 RX ORDER — HYDROCHLOROTHIAZIDE 12.5 MG/1
12.5 TABLET ORAL DAILY
Qty: 90 TABLET | Refills: 3 | Status: SHIPPED | OUTPATIENT
Start: 2021-06-23 | End: 2022-02-23 | Stop reason: SDUPTHER

## 2021-06-23 RX ORDER — ATORVASTATIN CALCIUM 40 MG/1
40 TABLET, FILM COATED ORAL DAILY
Qty: 90 TABLET | Refills: 3 | Status: SHIPPED | OUTPATIENT
Start: 2021-06-23 | End: 2022-02-23 | Stop reason: SDUPTHER

## 2021-06-23 ASSESSMENT — ENCOUNTER SYMPTOMS
SHORTNESS OF BREATH: 1
WHEEZING: 0
COUGH: 0
NAUSEA: 0
CHEST TIGHTNESS: 0
BLOOD IN STOOL: 0
STRIDOR: 0
GASTROINTESTINAL NEGATIVE: 1
EYES NEGATIVE: 1

## 2021-06-23 NOTE — PROGRESS NOTES
OFFICE VISIT         Patient: Patito Good  YOB: 1965  MRN: [id number]    Chief Complaint: DVT PE AF REBOLLEDO   Chief Complaint   Patient presents with    Follow Up After Procedure     S/P CATH 5/12    Atrial Fibrillation    Shortness of Breath       CV Data:  11/2020 Unprovoked B/L DVT and Saddle Embolism   Thyroid cancer s/p Thyroidectomy   4/21 SPECT abn anterior   5/12/21 Cath LAD - Aneurysmal dilation with moderate sequential lesions 50-60 and  IFR negative. EF 60       Subjective/HPI pt is SOB with any ADLs. No cp currently. Compliant with all meds. No bleed. No falls. haad AF since PE. Was seeing Dr. Maddie Lemos but changing. There were plans for CVN and Antiarrhythmic    3/23/21 still winded with ADLs. No pain. Went back into AF. No cp tired. 5/3/21 still REBOLLEDO no cp no bleed. No falls takes meds    6/23/21 still [place] no cp takes meds. No falls no bleed. Having pain with heel spurrs.      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  Former smoker  No ETOH  Lives with  and kids  ++FH     EKG: AF 80 QTc 477 ms    Past Medical History:   Diagnosis Date    Atrial fibrillation ([address]     DVT (deep vein thrombosis) in pregnancy     Hypertension     Ventricular tachycardia (HCC)        Past Surgical History:   Procedure Laterality Date    APPENDECTOMY      CARDIOVERSION  03/16/2021    GALLBLADDER SURGERY      THYROIDECTOMY      TONSILLECTOMY AND ADENOIDECTOMY      TUBAL LIGATION         Family History   Problem Relation Age of Onset    Stroke Mother         CVA    Diabetes Mother     Heart Disease Father     Heart Failure Sister        Social History     Socioeconomic History    Marital status:      Spouse name: None    Number of children: None    Years of education: None    Highest education level: None   Occupational History    None   Tobacco Use    Smoking status: Former Smoker     Packs/day: 1.50     Years: 35.00     Pack years: 52.50     Types: Cigarettes     Start date: 1984     Quit date:      Years since quittin.4    Smokeless tobacco: Never Used   Substance and Sexual Activity    Alcohol use: Not Currently    Drug use: None    Sexual activity: None   Other Topics Concern    None   Social History Narrative    None     Social Determinants of Health     Financial Resource Strain: Low Risk     Difficulty of Paying Living Expenses: Not hard at all   Food Insecurity: No Food Insecurity    Worried About Running Out of Food in the Last Year: Never true    Josefina of Food in the Last Year: Never true   Transportation Needs: No Transportation Needs    Lack of Transportation (Medical): No    Lack of Transportation (Non-Medical): No   Physical Activity:     Days of Exercise per Week:     Minutes of Exercise per Session:    Stress:     Feeling of Stress :    Social Connections:     Frequency of Communication with Friends and Family:     Frequency of Social Gatherings with Friends and Family:     Attends Confucianism Services:     Active Member of Clubs or Organizations:     Attends Club or Organization Meetings:     Marital Status:    Intimate Partner Violence:     Fear of Current or Ex-Partner:     Emotionally Abused:     Physically Abused:     Sexually Abused:         Allergies   Allergen Reactions    Lisinopril Other (See Comments)       Current Outpatient Medications   Medication Sig Dispense Refill    atorvastatin (LIPITOR) 40 MG tablet Take 1 tablet by mouth daily 90 tablet 3    hydroCHLOROthiazide (HYDRODIURIL) 12.5 MG tablet Take 1 tablet by mouth daily 90 tablet 3    aspirin 81 MG EC tablet Take 81 mg by mouth daily      pyridoxine (B-6) 100 MG tablet Take 50 mg by mouth daily      levothyroxine (SYNTHROID) 200 MCG tablet 1 po 6 days weeks and 1 1/2 po  (Patient taking differently: Take 200 mcg by mouth Daily 1 po 6 days weeks and 1 1/2 po ) 50 tablet 11    metoprolol succinate (TOPROL XL) 100 MG extended release tablet Take 150 mg by mouth 2 times daily      Cyanocobalamin (B-12) 1000 MCG SUBL Place under the tongue      folic acid (FOLVITE) 418 MCG tablet Take 800 mcg by mouth daily      apixaban (ELIQUIS) 5 MG TABS tablet Take by mouth 2 times daily       No current facility-administered medications for this visit. Review of Systems:   Review of Systems   Constitutional: Negative. Negative for diaphoresis and fatigue. HENT: Negative. Eyes: Negative. Respiratory: Positive for shortness of breath. Negative for cough, chest tightness, wheezing and stridor. Cardiovascular: Negative. Negative for chest pain, palpitations and leg swelling. Gastrointestinal: Negative. Negative for blood in stool and nausea. Genitourinary: Negative. Musculoskeletal: Negative. Skin: Negative. Neurological: Negative. Negative for dizziness, syncope, weakness and light-headedness. Hematological: Negative. Psychiatric/Behavioral: Negative. Physical Examination:    /82 (Site: Right Upper Arm, Position: Sitting, Cuff Size: Large Adult)   Pulse 62   Resp 18   Ht 5' 6\" (1.676 m)   Wt (!) 318 lb (144.2 kg)   LMP  (LMP Unknown)   SpO2 96%   BMI 51.33 kg/m²    Physical Exam   Constitutional: She appears healthy. No distress. HENT:   Normal cephalic and Atraumatic   Eyes: Pupils are equal, round, and reactive to light. Neck: Thyroid normal. No JVD present. No neck adenopathy. No thyromegaly present. Cardiovascular: Normal rate, intact distal pulses and normal pulses. An irregularly irregular rhythm present. Murmur heard. Pulmonary/Chest: Effort normal and breath sounds normal. She has no wheezes. She has no rales. She exhibits no tenderness. Abdominal: Soft. Bowel sounds are normal. There is no abdominal tenderness. Musculoskeletal:         General: No tenderness or edema. Normal range of motion. Cervical back: Normal range of motion and neck supple.    Neurological: She is alert and oriented to person, place, and time.   Skin: Skin is warm. No cyanosis. Nails show no clubbing.        LABS:  CBC:   Lab Results   Component Value Date    WBC 7.8 06/03/2021    RBC 5.30 06/03/2021    HGB 16.9 06/03/2021    HCT 51.0 06/03/2021    MCV 96.2 06/03/2021    MCH 31.9 06/03/2021    MCHC 33.1 06/03/2021    RDW 14.5 06/03/2021     06/03/2021     Lipids:  Lab Results   Component Value Date    CHOL 165 09/30/2020     Lab Results   Component Value Date    TRIG 192 09/30/2020     Lab Results   Component Value Date    HDL 40 09/30/2020     Lab Results   Component Value Date    LDLCALC 87 09/30/2020     Lab Results   Component Value Date    VLDL 38 09/30/2020     No results found for: CHOLHDLRATIO  CMP:    Lab Results   Component Value Date     06/03/2021    K 5.0 06/03/2021     06/03/2021    CO2 30 06/03/2021    BUN 22 06/03/2021    CREATININE 1.38 06/03/2021    GFRAA 48.0 06/03/2021    LABGLOM 39.7 06/03/2021    GLUCOSE 86 06/03/2021    PROT 7.1 05/27/2021    LABALBU 3.8 06/03/2021    CALCIUM 10.6 06/03/2021    BILITOT 0.5 05/27/2021    ALKPHOS 88 05/27/2021    AST 30 05/27/2021    ALT 23 05/27/2021     BMP:    Lab Results   Component Value Date     06/03/2021    K 5.0 06/03/2021     06/03/2021    CO2 30 06/03/2021    BUN 22 06/03/2021    LABALBU 3.8 06/03/2021    CREATININE 1.38 06/03/2021    CALCIUM 10.6 06/03/2021    GFRAA 48.0 06/03/2021    LABGLOM 39.7 06/03/2021    GLUCOSE 86 06/03/2021     Magnesium:    Lab Results   Component Value Date    MG 2.1 03/10/2021     TSH:No results found for: TSHFT4, TSH          Patient Active Problem List   Diagnosis    Postoperative hypothyroidism    Papillary thyroid carcinoma (HCC)    PAF (paroxysmal atrial fibrillation) (HCC)    Abnormal stress test       Medications Discontinued During This Encounter   Medication Reason    aspirin 81 MG chewable tablet DUPLICATE    hydroCHLOROthiazide (HYDRODIURIL) 12.5 MG tablet REORDER       Modified Medications    Modified Medication Previous Medication    HYDROCHLOROTHIAZIDE (HYDRODIURIL) 12.5 MG TABLET hydroCHLOROthiazide (HYDRODIURIL) 12.5 MG tablet       Take 1 tablet by mouth daily    Take 1 tablet by mouth daily       Orders Placed This Encounter   Medications    atorvastatin (LIPITOR) 40 MG tablet     Sig: Take 1 tablet by mouth daily     Dispense:  90 tablet     Refill:  3    hydroCHLOROthiazide (HYDRODIURIL) 12.5 MG tablet     Sig: Take 1 tablet by mouth daily     Dispense:  90 tablet     Refill:  3       Assessment/Plan:    1. Essential hypertension   stable    2. Atrial fibrillation, unspecified type ([address]   NOAC. 3. 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 Chronic deep vein thrombosis (DVT) of popliteal vein of both lower extremities (HCC)       5. TONG (dyspnea on exertion) -      6. CAD - mod LAD. IFR negative. Add Statin. Need to loose weight. Counseling:  Heart Healthy Lifestyle, Improve BMI, Low Salt Diet, Take Precautions to Prevent Falls and Walk Daily    Return in about 4 months (around 10/23/2021).     Electronically signed by Preeti Tafoya MD on 6/23/2021 at 4:12 PM

## 2021-06-29 DIAGNOSIS — E03.9 HYPOTHYROIDISM, UNSPECIFIED TYPE: ICD-10-CM

## 2021-06-29 DIAGNOSIS — R73.9 HYPERGLYCEMIA: ICD-10-CM

## 2021-06-29 LAB
ANION GAP SERPL CALCULATED.3IONS-SCNC: 10 MEQ/L (ref 9–15)
BUN BLDV-MCNC: 21 MG/DL (ref 6–20)
CALCIUM SERPL-MCNC: 10.2 MG/DL (ref 8.5–9.9)
CHLORIDE BLD-SCNC: 101 MEQ/L (ref 95–107)
CO2: 31 MEQ/L (ref 20–31)
CREAT SERPL-MCNC: 1.24 MG/DL (ref 0.5–0.9)
GFR AFRICAN AMERICAN: 54.3
GFR NON-AFRICAN AMERICAN: 44.9
GLUCOSE BLD-MCNC: 91 MG/DL (ref 70–99)
HBA1C MFR BLD: 6.2 % (ref 4.8–5.9)
POTASSIUM SERPL-SCNC: 4.4 MEQ/L (ref 3.4–4.9)
SODIUM BLD-SCNC: 142 MEQ/L (ref 135–144)
T4 FREE: 1.67 NG/DL (ref 0.84–1.68)
TSH SERPL DL<=0.05 MIU/L-ACNC: 0.61 UIU/ML (ref 0.44–3.86)

## 2021-07-07 ENCOUNTER — OFFICE VISIT (OUTPATIENT)
Dept: ENDOCRINOLOGY | Age: 56
End: 2021-07-07
Payer: COMMERCIAL

## 2021-07-07 VITALS
OXYGEN SATURATION: 94 % | DIASTOLIC BLOOD PRESSURE: 86 MMHG | HEART RATE: 71 BPM | HEIGHT: 66 IN | WEIGHT: 293 LBS | SYSTOLIC BLOOD PRESSURE: 124 MMHG | BODY MASS INDEX: 47.09 KG/M2

## 2021-07-07 DIAGNOSIS — R73.03 PREDIABETES: ICD-10-CM

## 2021-07-07 DIAGNOSIS — C73 PAPILLARY THYROID CARCINOMA (HCC): ICD-10-CM

## 2021-07-07 DIAGNOSIS — E03.9 HYPOTHYROIDISM, UNSPECIFIED TYPE: Primary | ICD-10-CM

## 2021-07-07 DIAGNOSIS — G62.9 PERIPHERAL POLYNEUROPATHY: ICD-10-CM

## 2021-07-07 PROCEDURE — 99213 OFFICE O/P EST LOW 20 MIN: CPT | Performed by: INTERNAL MEDICINE

## 2021-07-07 RX ORDER — GABAPENTIN 300 MG/1
300 CAPSULE ORAL DAILY
Qty: 90 CAPSULE | Refills: 3 | Status: ON HOLD | OUTPATIENT
Start: 2021-07-07 | End: 2021-07-21

## 2021-07-07 NOTE — PROGRESS NOTES
7/7/2021    Assessment:       Diagnosis Orders   1. Hypothyroidism, unspecified type  TSH with Reflex    T4, Free    Thyroglobulin    Anti-Thyroglobulin Antibody   2. Prediabetes  Basic Metabolic Panel    Thyroglobulin   3. Peripheral polyneuropathy     4. Papillary thyroid carcinoma (HCC)           PLAN:     Orders Placed This Encounter   Procedures    TSH with Reflex     Standing Status:   Future     Standing Expiration Date:   7/7/2022    T4, Free     Standing Status:   Future     Standing Expiration Date:   7/7/2022    Basic Metabolic Panel     Standing Status:   Future     Standing Expiration Date:   7/7/2022    Thyroglobulin     Standing Status:   Future     Standing Expiration Date:   7/7/2022    Anti-Thyroglobulin Antibody     Standing Status:   Future     Standing Expiration Date:   7/7/2022     Continue current dose of Synthroid follow-up in 3 to 6 months time  Orders Placed This Encounter   Medications    gabapentin (NEURONTIN) 300 MG capsule     Sig: Take 1 capsule by mouth daily for 180 days. Intended supply: 90 days     Dispense:  90 capsule     Refill:  3         Subjective:     Chief Complaint   Patient presents with    Hypothyroidism     Vitals:    07/07/21 1007 07/07/21 1009   BP: (!) 137/91 124/86   Pulse: 71    SpO2: 94%    Weight: (!) 317 lb (143.8 kg)    Height: 5' 6\" (1.676 m)      Wt Readings from Last 3 Encounters:   07/07/21 (!) 317 lb (143.8 kg)   06/23/21 (!) 318 lb (144.2 kg)   06/02/21 (!) 318 lb 6.4 oz (144.4 kg)     BP Readings from Last 3 Encounters:   07/07/21 124/86   06/23/21 132/82   06/02/21 128/78     Follow-up on hypothyroidism patient on Synthroid 200mcg 6 days a week and 300 mcg 1 day a week  Thyroid function tests were normal  Labs also show a mild prediabetes A1c of 6.2  History of papillary thyroid carcinoma status post thyroidectomy  Last thyroglobulin level 0.1  Patient requesting gabapentin for neuropathy    Other  This is a chronic (Hypothyroidism) problem.  The current episode started more than 1 year ago. The problem occurs intermittently. The problem has been waxing and waning. Treatments tried: Synthroid. The treatment provided moderate relief. Past Medical History:   Diagnosis Date    Atrial fibrillation ([address]     DVT (deep vein thrombosis) in pregnancy     Hypertension     Ventricular tachycardia (HCC)      Past Surgical History:   Procedure Laterality Date    APPENDECTOMY      CARDIOVERSION  2021    GALLBLADDER SURGERY      THYROIDECTOMY      TONSILLECTOMY AND ADENOIDECTOMY      TUBAL LIGATION       Social History     Socioeconomic History    Marital status:      Spouse name: Not on file    Number of children: Not on file    Years of education: Not on file    Highest education level: Not on file   Occupational History    Not on file   Tobacco Use    Smoking status: Former Smoker     Packs/day: 1.50     Years: 35.00     Pack years: 52.50     Types: Cigarettes     Start date:      Quit date:      Years since quittin.5    Smokeless tobacco: Never Used   Substance and Sexual Activity    Alcohol use: Not Currently    Drug use: Not on file    Sexual activity: Not on file   Other Topics Concern    Not on file   Social History Narrative    Not on file     Social Determinants of Health     Financial Resource Strain: Low Risk     Difficulty of Paying Living Expenses: Not hard at all   Food Insecurity: No Food Insecurity    Worried About Running Out of Food in the Last Year: Never true    Josefina of Food in the Last Year: Never true   Transportation Needs: No Transportation Needs    Lack of Transportation (Medical): No    Lack of Transportation (Non-Medical):  No   Physical Activity:     Days of Exercise per Week:     Minutes of Exercise per Session:    Stress:     Feeling of Stress :    Social Connections:     Frequency of Communication with Friends and Family:     Frequency of Social Gatherings with Friends and Family:     Attends Caodaism Services:     Active Member of Clubs or Organizations:     Attends Club or Organization Meetings:     Marital Status:    Intimate Partner Violence:     Fear of Current or Ex-Partner:     Emotionally Abused:     Physically Abused:     Sexually Abused:      Family History   Problem Relation Age of Onset    Stroke Mother         CVA    Diabetes Mother     Heart Disease Father     Heart Failure Sister      Allergies   Allergen Reactions    Lisinopril Other (See Comments)       Current Outpatient Medications:     atorvastatin (LIPITOR) 40 MG tablet, Take 1 tablet by mouth daily, Disp: 90 tablet, Rfl: 3    hydroCHLOROthiazide (HYDRODIURIL) 12.5 MG tablet, Take 1 tablet by mouth daily, Disp: 90 tablet, Rfl: 3    aspirin 81 MG EC tablet, Take 81 mg by mouth daily, Disp: , Rfl:     pyridoxine (B-6) 100 MG tablet, Take 50 mg by mouth daily, Disp: , Rfl:     levothyroxine (SYNTHROID) 200 MCG tablet, 1 po 6 days weeks and 1 1/2 po Sunday (Patient taking differently: Take 200 mcg by mouth Daily 1 po 6 days weeks and 1 1/2 po Sunday), Disp: 50 tablet, Rfl: 11    metoprolol succinate (TOPROL XL) 100 MG extended release tablet, Take 150 mg by mouth 2 times daily, Disp: , Rfl:     Cyanocobalamin (B-12) 1000 MCG SUBL, Place under the tongue, Disp: , Rfl:     folic acid (FOLVITE) 494 MCG tablet, Take 800 mcg by mouth daily, Disp: , Rfl:     apixaban (ELIQUIS) 5 MG TABS tablet, Take by mouth 2 times daily, Disp: , Rfl:   Lab Results   Component Value Date     06/29/2021    K 4.4 06/29/2021     06/29/2021    CO2 31 06/29/2021    BUN 21 (H) 06/29/2021    CREATININE 1.24 (H) 06/29/2021    GLUCOSE 91 06/29/2021    CALCIUM 10.2 (H) 06/29/2021    PROT 7.1 05/27/2021    LABALBU 3.8 06/03/2021    BILITOT 0.5 05/27/2021    ALKPHOS 88 05/27/2021    AST 30 05/27/2021    ALT 23 05/27/2021    LABGLOM 44.9 (L) 06/29/2021    GFRAA 54.3 (L) 06/29/2021    GLOB 3.6 (H) 05/27/2021     Lab Results   Component Value Date    WBC 7.8 06/03/2021    HGB 16.9 (H) 06/03/2021    HCT 51.0 (H) 06/03/2021    MCV 96.2 06/03/2021     06/03/2021     Lab Results   Component Value Date    LABA1C 6.2 (H) 06/29/2021     Lab Results   Component Value Date    HDL 40 09/30/2020    LDLCALC 87 09/30/2020    CHOL 165 09/30/2020    TRIG 192 09/30/2020   Results for Briana Chau (MRN [id number]) as of 7/11/2021 14:20   Ref. Range 3/10/2021 09:49   THYROGLOBULIN, SERUM OR PLASMA Latest Ref Range: 1.3 - 31.8 ng/mL 0.1 (L)         Lab Results   Component Value Date    TSH 0.607 06/29/2021    TSHREFLEX 2.050 03/10/2021    T4FREE 1.67 06/29/2021    T4FREE 1.61 03/10/2021       Review of Systems   Cardiovascular: Negative. Endocrine: Negative. All other systems reviewed and are negative. Objective:   Physical Exam  Vitals reviewed. Constitutional:       Appearance: Normal appearance. She is obese. HENT:      Head: Normocephalic and atraumatic. Hair is normal.      Right Ear: External ear normal.      Left Ear: External ear normal.      Nose: Nose normal.   Eyes:      General: No scleral icterus. Right eye: No discharge. Left eye: No discharge. Extraocular Movements: Extraocular movements intact. Conjunctiva/sclera: Conjunctivae normal.   Neck:      Trachea: Trachea normal.   Cardiovascular:      Rate and Rhythm: Normal rate. Pulmonary:      Effort: Pulmonary effort is normal.   Musculoskeletal:         General: Normal range of motion. Cervical back: Normal range of motion and neck supple. Neurological:      General: No focal deficit present. Mental Status: She is alert and oriented to person, place, and time.    Psychiatric:         Mood and Affect: Mood normal.         Behavior: Behavior normal.

## 2021-07-14 ENCOUNTER — TELEPHONE (OUTPATIENT)
Dept: ENDOCRINOLOGY | Age: 56
End: 2021-07-14

## 2021-07-14 RX ORDER — METOPROLOL SUCCINATE 100 MG/1
150 TABLET, EXTENDED RELEASE ORAL 2 TIMES DAILY
Qty: 270 TABLET | Refills: 2 | Status: SHIPPED | OUTPATIENT
Start: 2021-07-14 | End: 2022-02-23 | Stop reason: SDUPTHER

## 2021-07-15 DIAGNOSIS — E03.9 HYPOTHYROIDISM, UNSPECIFIED TYPE: Primary | ICD-10-CM

## 2021-07-15 RX ORDER — GABAPENTIN 100 MG/1
100 CAPSULE ORAL DAILY
Qty: 30 CAPSULE | Refills: 5 | Status: SHIPPED | OUTPATIENT
Start: 2021-07-15 | End: 2022-01-11

## 2021-07-21 ENCOUNTER — HOSPITAL ENCOUNTER (OUTPATIENT)
Age: 56
Setting detail: OUTPATIENT SURGERY
Discharge: HOME | End: 2021-07-21
Attending: INTERNAL MEDICINE | Admitting: INTERNAL MEDICINE
Payer: COMMERCIAL

## 2021-07-21 ENCOUNTER — ANESTHESIA (OUTPATIENT)
Dept: ENDOSCOPY | Age: 56
End: 2021-07-21
Payer: COMMERCIAL

## 2021-07-21 ENCOUNTER — ANESTHESIA EVENT (OUTPATIENT)
Dept: ENDOSCOPY | Age: 56
End: 2021-07-21
Payer: COMMERCIAL

## 2021-07-21 ENCOUNTER — ANCILLARY PROCEDURE (OUTPATIENT)
Dept: ENDOSCOPY | Age: 56
End: 2021-07-21
Attending: INTERNAL MEDICINE
Payer: COMMERCIAL

## 2021-07-21 VITALS
SYSTOLIC BLOOD PRESSURE: 126 MMHG | BODY MASS INDEX: 47.09 KG/M2 | HEART RATE: 88 BPM | WEIGHT: 293 LBS | OXYGEN SATURATION: 94 % | RESPIRATION RATE: 16 BRPM | TEMPERATURE: 97.8 F | HEIGHT: 66 IN | DIASTOLIC BLOOD PRESSURE: 70 MMHG

## 2021-07-21 VITALS
SYSTOLIC BLOOD PRESSURE: 115 MMHG | OXYGEN SATURATION: 96 % | RESPIRATION RATE: 13 BRPM | DIASTOLIC BLOOD PRESSURE: 76 MMHG

## 2021-07-21 PROCEDURE — 6370000000 HC RX 637 (ALT 250 FOR IP): Performed by: INTERNAL MEDICINE

## 2021-07-21 PROCEDURE — 2709999900 HC NON-CHARGEABLE SUPPLY: Performed by: INTERNAL MEDICINE

## 2021-07-21 PROCEDURE — 7100000011 HC PHASE II RECOVERY - ADDTL 15 MIN: Performed by: INTERNAL MEDICINE

## 2021-07-21 PROCEDURE — 2580000003 HC RX 258: Performed by: INTERNAL MEDICINE

## 2021-07-21 PROCEDURE — 6360000002 HC RX W HCPCS: Performed by: NURSE ANESTHETIST, CERTIFIED REGISTERED

## 2021-07-21 PROCEDURE — 2500000003 HC RX 250 WO HCPCS: Performed by: NURSE ANESTHETIST, CERTIFIED REGISTERED

## 2021-07-21 PROCEDURE — 3700000001 HC ADD 15 MINUTES (ANESTHESIA): Performed by: INTERNAL MEDICINE

## 2021-07-21 PROCEDURE — 2580000003 HC RX 258: Performed by: NURSE ANESTHETIST, CERTIFIED REGISTERED

## 2021-07-21 PROCEDURE — 45385 COLONOSCOPY W/LESION REMOVAL: CPT | Performed by: INTERNAL MEDICINE

## 2021-07-21 PROCEDURE — 88305 TISSUE EXAM BY PATHOLOGIST: CPT

## 2021-07-21 PROCEDURE — 3609027000 HC COLONOSCOPY: Performed by: INTERNAL MEDICINE

## 2021-07-21 PROCEDURE — 45380 COLONOSCOPY AND BIOPSY: CPT | Performed by: INTERNAL MEDICINE

## 2021-07-21 PROCEDURE — 7100000010 HC PHASE II RECOVERY - FIRST 15 MIN: Performed by: INTERNAL MEDICINE

## 2021-07-21 PROCEDURE — 3700000000 HC ANESTHESIA ATTENDED CARE: Performed by: INTERNAL MEDICINE

## 2021-07-21 RX ORDER — PROPOFOL 10 MG/ML
INJECTION, EMULSION INTRAVENOUS PRN
Status: DISCONTINUED | OUTPATIENT
Start: 2021-07-21 | End: 2021-07-21 | Stop reason: SDUPTHER

## 2021-07-21 RX ORDER — MAGNESIUM HYDROXIDE 1200 MG/15ML
LIQUID ORAL PRN
Status: DISCONTINUED | OUTPATIENT
Start: 2021-07-21 | End: 2021-07-21 | Stop reason: ALTCHOICE

## 2021-07-21 RX ORDER — LIDOCAINE HYDROCHLORIDE 20 MG/ML
INJECTION, SOLUTION INFILTRATION; PERINEURAL PRN
Status: DISCONTINUED | OUTPATIENT
Start: 2021-07-21 | End: 2021-07-21 | Stop reason: SDUPTHER

## 2021-07-21 RX ORDER — 0.9 % SODIUM CHLORIDE 0.9 %
500 INTRAVENOUS SOLUTION INTRAVENOUS ONCE
Status: COMPLETED | OUTPATIENT
Start: 2021-07-21 | End: 2021-07-21

## 2021-07-21 RX ORDER — SODIUM CHLORIDE 9 MG/ML
INJECTION, SOLUTION INTRAVENOUS CONTINUOUS PRN
Status: DISCONTINUED | OUTPATIENT
Start: 2021-07-21 | End: 2021-07-21 | Stop reason: SDUPTHER

## 2021-07-21 RX ADMIN — SODIUM CHLORIDE 500 ML: 9 INJECTION, SOLUTION INTRAVENOUS at 11:13

## 2021-07-21 RX ADMIN — SODIUM CHLORIDE: 9 INJECTION, SOLUTION INTRAVENOUS at 11:26

## 2021-07-21 RX ADMIN — LIDOCAINE HYDROCHLORIDE 50 MG: 20 INJECTION, SOLUTION INFILTRATION; PERINEURAL at 11:28

## 2021-07-21 RX ADMIN — PROPOFOL 300 MG: 10 INJECTION, EMULSION INTRAVENOUS at 11:28

## 2021-07-21 ASSESSMENT — PULMONARY FUNCTION TESTS
PIF_VALUE: 2
PIF_VALUE: 0
PIF_VALUE: 1
PIF_VALUE: 0

## 2021-07-21 ASSESSMENT — LIFESTYLE VARIABLES: SMOKING_STATUS: 0

## 2021-07-21 ASSESSMENT — PAIN SCALES - GENERAL: PAINLEVEL_OUTOF10: 0

## 2021-07-21 NOTE — ANESTHESIA PRE PROCEDURE
Department of Anesthesiology  Preprocedure Note       Name:  David Marcelino   Age:  55 y.o.  :  1965                                          MRN:  [id number]         Date:  2021      Surgeon: Delfino Rodriguez):  Jen Nickerson MD    Procedure: Procedure(s):  COLONOSCOPY DIAGNOSTIC    Medications prior to admission:   Prior to Admission medications    Medication Sig Start Date End Date Taking? Authorizing Provider   gabapentin (NEURONTIN) 100 MG capsule Take 1 capsule by mouth daily for 180 days. Intended supply: 90 days 7/15/21 1/11/22  Freddie Her MD   metoprolol succinate (TOPROL XL) 100 MG extended release tablet Take 1.5 tablets by mouth 2 times daily 21   Jonny Holley MD   gabapentin (NEURONTIN) 300 MG capsule Take 1 capsule by mouth daily for 180 days. Intended supply: 90 days 7/7/21 1/3/22  Freddie Her MD   atorvastatin (LIPITOR) 40 MG tablet Take 1 tablet by mouth daily 21   Jonny Holley MD   hydroCHLOROthiazide (HYDRODIURIL) 12.5 MG tablet Take 1 tablet by mouth daily 21   Jonny Holley MD   aspirin 81 MG EC tablet Take 81 mg by mouth daily    Historical Provider, MD   pyridoxine (B-6) 100 MG tablet Take 50 mg by mouth daily    Historical Provider, MD   levothyroxine (SYNTHROID) 200 MCG tablet 1 po 6 days weeks and 1 1/2 po   Patient taking differently: Take 200 mcg by mouth Daily 1 po 6 days weeks and 1 1/2 po Delio 3/10/21   Freddie Her MD   Cyanocobalamin (B-12) 1000 MCG SUBL Place under the tongue    Historical Provider, MD   folic acid (FOLVITE) 295 MCG tablet Take 800 mcg by mouth daily    Historical Provider, MD   apixaban (ELIQUIS) 5 MG TABS tablet Take by mouth 2 times daily    Historical Provider, MD       Current medications:    No current facility-administered medications for this encounter. 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    Papillary thyroid carcinoma ([facility] [address] C73    PAF (paroxysmal atrial fibrillation) (HCC) I48.0    Abnormal stress test R94.39       Past Medical History:        Diagnosis Date    Atrial fibrillation ([facility]     DVT (deep vein thrombosis) in pregnancy     Hypertension     Ventricular tachycardia (HCC)        Past Surgical History:        Procedure Laterality Date    APPENDECTOMY      CARDIOVERSION  2021    GALLBLADDER SURGERY      THYROIDECTOMY      TONSILLECTOMY AND ADENOIDECTOMY      TUBAL LIGATION         Social History:    Social History     Tobacco Use    Smoking status: Former Smoker     Packs/day: 1.50     Years: 35.00     Pack years: 52.50     Types: Cigarettes     Start date:      Quit date:      Years since quittin.5    Smokeless tobacco: Never Used   Substance Use Topics    Alcohol use: Not Currently                                Counseling given: Not Answered      Vital Signs (Current): There were no vitals filed for this visit.                                            BP Readings from Last 3 Encounters:   21 124/86   21 132/82   21 128/78       NPO Status:                                                                                 BMI:   Wt Readings from Last 3 Encounters:   21 (!) 317 lb (143.8 kg)   21 (!) 318 lb (144.2 kg)   21 (!) 318 lb 6.4 oz (144.4 kg)     There is no height or weight on file to calculate BMI.    CBC:   Lab Results   Component Value Date    WBC 7.8 2021    RBC 5.30 2021    HGB 16.9 2021    HCT 51.0 2021    MCV 96.2 2021    RDW 14.5 2021     2021       CMP:   Lab Results   Component Value Date     2021    K 4.4 2021     2021    CO2 31 2021    BUN 21 2021    CREATININE 1.24 2021    GFRAA 54.3 2021    LABGLOM 44.9 2021    GLUCOSE 91 2021    PROT 7.1 2021    CALCIUM 10.2 06/29/2021    BILITOT 0.5 05/27/2021    ALKPHOS 88 05/27/2021    AST 30 05/27/2021    ALT 23 05/27/2021       POC Tests: No results for input(s): POCGLU, POCNA, POCK, POCCL, POCBUN, POCHEMO, POCHCT in the last 72 hours. Coags: No results found for: PROTIME, INR, APTT    HCG (If Applicable): No results found for: PREGTESTUR, PREGSERUM, HCG, HCGQUANT     ABGs: No results found for: PHART, PO2ART, QQN1HET, BPT3ZFY, BEART, N8GTXIYS     Type & Screen (If Applicable):  No results found for: LABABO, LABRH    Drug/Infectious Status (If Applicable):  No results found for: HIV, HEPCAB    COVID-19 Screening (If Applicable): No results found for: COVID19        Anesthesia Evaluation  Patient summary reviewed and Nursing notes reviewed no history of anesthetic complications:   Airway: Mallampati: III  TM distance: >3 FB   Neck ROM: full  Mouth opening: > = 3 FB Dental: normal exam         Pulmonary:       (-) not a current smoker                           Cardiovascular:    (+) hypertension:, dysrhythmias: atrial fibrillation,       ECG reviewed        Stress test reviewed       Beta Blocker:  Dose within 24 Hrs      ROS comment: Recent diagnostic cardiac cath: Angiographic Findings   Cardiac Arteries and Lesion Findings  LMCA: Normal.  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 IFR done and negative. LCx: Normal.  RCA: Normal.  Dominance: Right  LV function assessed as:Normal.  Ejection Fraction    - Method: LV gram. EF%: 60.   VA  Ventriculography Findings  EDP 15-17   Medical History   Risk Factors   The patient risk factors include:obesity, treated hypertension, last   creatinine: 1.4 mg/dl and creatinine clearance: 103.93 ml/min.      Neuro/Psych:   Negative Neuro/Psych ROS              GI/Hepatic/Renal:   (+) bowel prep, morbid obesity          Endo/Other:    (+) hypothyroidism, blood dyscrasia: anticoagulation therapy:., .          Pt had no PAT visit Abdominal:   (+) obese,           Vascular: negative vascular ROS. Other Findings:           Anesthesia Plan      MAC     ASA 3       Induction: intravenous. Anesthetic plan and risks discussed with patient. Plan discussed with attending.                   KRYS Rodrigues - CRNA   7/21/2021

## 2021-07-21 NOTE — H&P
Patient Name: David Marcelino  : 1965  MRN: [id number]  DATE: 21      ENDOSCOPY  History and Physical    Procedure:    [x] Diagnostic Colonoscopy       [] Screening Colonoscopy  [] EGD      [] ERCP      [] EUS       [] Other    [x] Previous office notes/History and Physical reviewed from the patients chart. Please see EMR for further details of HPI. I have examined the patient's status immediately prior to the procedure and:      Indications/HPI:    []Abdominal Pain   []Cancer- GI/Lung  []Fhx of colon CA/polyps  []History of Polyps   []Padgetts   []Melena  []Abnormal Imaging   []Dysphagia    []Persistent Pneumonia  []Anemia   []Food Impaction  []History of Polyps  []GI Bleed   []Pulmonary nodule/Mass  []Change in bowel habits  []Heartburn/Reflux  []Rectal Bleed (BRBPR)  []Chest Pain - Non Cardiac  []Heme (+) Stool  []Ulcers  []Constipation   []Hemoptysis   []Varices  []Diarrhea   []Hypoxemia  []Nausea/Vomiting   []Screening   []Crohns/Colitis  []Other: Positive cologuard     Anesthesia:   [x] MAC [] Moderate Sedation   [] General   [] None     ROS: 12 pt Review of Symptoms was negative unless mentioned above    Medications:   Prior to Admission medications    Medication Sig Start Date End Date Taking? Authorizing Provider   gabapentin (NEURONTIN) 100 MG capsule Take 1 capsule by mouth daily for 180 days. Intended supply: 90 days 7/15/21 1/11/22  Freddie Her MD   metoprolol succinate (TOPROL XL) 100 MG extended release tablet Take 1.5 tablets by mouth 2 times daily 21   Jonny Holley MD   gabapentin (NEURONTIN) 300 MG capsule Take 1 capsule by mouth daily for 180 days.  Intended supply: 90 days 7/7/21 1/3/22  Freddie Her MD   atorvastatin (LIPITOR) 40 MG tablet Take 1 tablet by mouth daily 21   Jonny Holley MD   hydroCHLOROthiazide (HYDRODIURIL) 12.5 MG tablet Take 1 tablet by mouth daily 21   Jonny Holley MD   aspirin 81 MG EC tablet Take 81 mg by mouth daily    Historical Provider, MD pyridoxine (B-6) 100 MG tablet Take 50 mg by mouth daily    Historical Provider, MD   levothyroxine (SYNTHROID) 200 MCG tablet 1 po 6 days weeks and 1 1/2 po   Patient taking differently: Take 200 mcg by mouth Daily 1 po 6 days weeks and 1 1/2 po Delio 3/10/21   Dorina Farmer MD   Cyanocobalamin (B-12) 1000 MCG SUBL Place under the tongue    Historical Provider, MD   folic acid (FOLVITE) 079 MCG tablet Take 800 mcg by mouth daily    Historical Provider, MD   apixaban (ELIQUIS) 5 MG TABS tablet Take by mouth 2 times daily    Historical Provider, MD       Allergies:    Allergies   Allergen Reactions    Lisinopril Other (See Comments)        History of allergic reaction to anesthesia:  No    Past Medical History:  Past Medical History:   Diagnosis Date    Atrial fibrillation ([address]     DVT (deep vein thrombosis) in pregnancy     Hypertension     Ventricular tachycardia (HCC)        Past Surgical History:  Past Surgical History:   Procedure Laterality Date    APPENDECTOMY      CARDIOVERSION  2021    GALLBLADDER SURGERY      THYROIDECTOMY      TONSILLECTOMY AND ADENOIDECTOMY      TUBAL LIGATION         Social History:  Social History     Tobacco Use    Smoking status: Former Smoker     Packs/day: 1.50     Years: 35.00     Pack years: 52.50     Types: Cigarettes     Start date:      Quit date:      Years since quittin.5    Smokeless tobacco: Never Used   Substance Use Topics    Alcohol use: Not Currently    Drug use: Not on file       Vital Signs:   Vitals:    21 1100   BP: (!) 144/89   Pulse: 86   Resp: 18   Temp: 97.8 °F (36.6 °C)   SpO2: 94%        Physical Exam:  Cardiac:  [x]WNL  []Comments:  Pulmonary:  [x]WNL   []Comments:   Neuro/Mental Status:  [x]WNL  []Comments:  Abdominal:  [x]WNL    []Comments:  Other:   []WNL  []Comments:    Informed Consent:  The risks and benefits of the procedure have been discussed with either the patient or if they cannot consent, their

## 2021-07-21 NOTE — ANESTHESIA POSTPROCEDURE EVALUATION
Department of Anesthesiology  Postprocedure Note    Patient: Greg Bolden  MRN: [id number]  YOB: 1965  Date of evaluation: 7/21/2021  Time:  11:54 AM     Procedure Summary     Date: 07/21/21 Room / Location: [address] [place] [address]    Anesthesia Start: 1126 Anesthesia Stop:     Procedure: COLONOSCOPY DIAGNOSTIC WITH POLYPECTOMY (N/A ) Diagnosis: (Positive Cologuard test R19.5)    Surgeons: Seferino Martel MD Responsible Provider: KRYS Santamaria CRNA    Anesthesia Type: MAC ASA Status: 3          Anesthesia Type: No value filed. Sarah Phase I: Sarah Score: 10    Sarah Phase II:      Last vitals: Reviewed and per EMR flowsheets.        Anesthesia Post Evaluation    Patient location during evaluation: bedside  Patient participation: complete - patient participated  Level of consciousness: awake and awake and alert  Pain score: 0  Airway patency: patent  Nausea & Vomiting: no nausea and no vomiting  Complications: no  Cardiovascular status: blood pressure returned to baseline and hemodynamically stable  Respiratory status: acceptable  Hydration status: euvolemic

## 2021-08-07 NOTE — RESULT ENCOUNTER NOTE
8/7/2021  [address] [place] [id number]    Dear Nan Carlin,    Your colonoscopy reveled a growth on the large intestine (Colon) called a polyp. A polyp could be a \"precancerous\" growth, which means that with time it could turn into cancer. Multiple polyps were removed during your procedure. As instructed after your colonoscopy, recommendations suggest a follow up colonoscopy in 1 to 2 years    We hope you are doing well, and if you have any questions please contact me at [phone]. Thank you for choosing [facility] for your healthcare.     Sincerely,     Saul Magallanes MD

## 2021-09-02 ENCOUNTER — OFFICE VISIT (OUTPATIENT)
Dept: FAMILY MEDICINE CLINIC | Age: 56
End: 2021-09-02
Payer: COMMERCIAL

## 2021-09-02 VITALS
HEIGHT: 66 IN | TEMPERATURE: 97.3 F | RESPIRATION RATE: 16 BRPM | WEIGHT: 293 LBS | OXYGEN SATURATION: 95 % | DIASTOLIC BLOOD PRESSURE: 80 MMHG | SYSTOLIC BLOOD PRESSURE: 110 MMHG | HEART RATE: 80 BPM | BODY MASS INDEX: 47.09 KG/M2

## 2021-09-02 DIAGNOSIS — K63.5 POLYP OF COLON, UNSPECIFIED PART OF COLON, UNSPECIFIED TYPE: ICD-10-CM

## 2021-09-02 DIAGNOSIS — G47.30 SLEEP-DISORDERED BREATHING: ICD-10-CM

## 2021-09-02 DIAGNOSIS — D75.1 POLYCYTHEMIA: ICD-10-CM

## 2021-09-02 DIAGNOSIS — N18.31 STAGE 3A CHRONIC KIDNEY DISEASE (HCC): ICD-10-CM

## 2021-09-02 DIAGNOSIS — I48.0 PAF (PAROXYSMAL ATRIAL FIBRILLATION) (HCC): Primary | ICD-10-CM

## 2021-09-02 DIAGNOSIS — E89.0 POSTOPERATIVE HYPOTHYROIDISM: ICD-10-CM

## 2021-09-02 PROCEDURE — 99214 OFFICE O/P EST MOD 30 MIN: CPT | Performed by: FAMILY MEDICINE

## 2021-09-02 ASSESSMENT — ENCOUNTER SYMPTOMS
APNEA: 0
DIARRHEA: 0
CONSTIPATION: 0
NAUSEA: 0
ABDOMINAL PAIN: 0
VOMITING: 0
SHORTNESS OF BREATH: 0
CHEST TIGHTNESS: 0
COUGH: 0
BLOOD IN STOOL: 0

## 2021-09-02 NOTE — PROGRESS NOTES
Subjective:      Patient ID: Arnold Hernández is a 55 y.o. 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 She is seeing endocrinology for her hypothyroidism. She has a history of hypertension that is well controlled.   She denies any chest discomfort, shortness of breath or lower extremity edema  Upon further questioning patient does have daytime fatigue and a history of heavy snoring despite tonsillectomy and adenoidectomy  Past Medical History:   Diagnosis Date    [facility] ([facility]     Hypertension     PE (pulmonary thromboembolism) ([facility] 2020    Ventricular tachycardia (HCC)      Past Surgical History:   Procedure Laterality Date    APPENDECTOMY      CARDIOVERSION  2021    CHOLECYSTECTOMY      COLONOSCOPY N/A 2021    COLONOSCOPY DIAGNOSTIC WITH POLYPECTOMY performed by Ahmet Cary [facility] at [facility]      THYROIDECTOMY      TONSILLECTOMY AND ADENOIDECTOMY      TUBAL LIGATION       Family History   Problem Relation Age of Onset    Stroke Mother         CVA    Diabetes Mother     Heart Disease Father     Heart Failure Sister     Colon Cancer Neg Hx      Social History     Socioeconomic History    Marital status:      Spouse name: Not on file    Number of children: Not on file    Years of education: Not on file    Highest education level: Not on file   Occupational History    Not on file   Tobacco Use    Smoking status: Former Smoker     Packs/day: 1.50     Years: 35.00     Pack years: 52.50     Types: Cigarettes     Start date:      Quit date:      Years since quittin.6    Smokeless tobacco: Never Used Vaping Use    Vaping Use: Never used   Substance and Sexual Activity    Alcohol use: Not Currently    Drug use: Never    Sexual activity: Not on file   Other Topics Concern    Not on file   Social History Narrative    Not on file     Social Determinants of Health     Financial Resource Strain: Low Risk     Difficulty of Paying Living Expenses: Not hard at all   Food Insecurity: No Food Insecurity    Worried About Running Out of Food in the Last Year: Never true    [address] in the Last Year: Never true   Transportation Needs: No Transportation Needs    Lack of Transportation (Medical): No    Lack of Transportation (Non-Medical): No   Physical Activity:     Days of Exercise per Week:     Minutes of Exercise per Session:    Stress:     Feeling of Stress :    Social Connections:     Frequency of Communication with Friends and Family:     Frequency of Social Gatherings with Friends and Family:     Attends [employer] Services:     Active Member of Clubs or Organizations:     Attends Club or Organization Meetings:     Marital Status:    Intimate Partner Violence:     Fear of Current or Ex-Partner:     Emotionally Abused:     Physically Abused:     Sexually Abused:      Current Outpatient Medications on File Prior to Visit   Medication Sig Dispense Refill    gabapentin (NEURONTIN) 100 MG capsule Take 1 capsule by mouth daily for 180 days.  Intended supply: 90 days 30 capsule 5    metoprolol succinate (TOPROL XL) 100 MG extended release tablet Take 1.5 tablets by mouth 2 times daily 270 tablet 2    atorvastatin (LIPITOR) 40 MG tablet Take 1 tablet by mouth daily 90 tablet 3    hydroCHLOROthiazide (HYDRODIURIL) 12.5 MG tablet Take 1 tablet by mouth daily 90 tablet 3    aspirin 81 MG EC tablet Take 81 mg by mouth daily      pyridoxine (B-6) 100 MG tablet Take 50 mg by mouth daily      levothyroxine (SYNTHROID) 200 MCG tablet 1 po 6 days weeks and 1 1/2 po Sunday (Patient taking No wheezing or rales. Chest:      Chest wall: No tenderness. Abdominal:      General: Abdomen is flat. Bowel sounds are normal.      Palpations: Abdomen is soft. Tenderness: There is no abdominal tenderness. Musculoskeletal:      Cervical back: Normal range of motion. Skin:     General: Skin is warm and dry. Neurological:      Mental Status: She is alert and oriented to person, place, and time. Psychiatric:         Behavior: Behavior normal.         Thought Content: Thought content normal.         Judgment: Judgment normal.         Assessment & Plan:     1. PAF (paroxysmal atrial fibrillation) ([facility]  Follow-up with cardiology    2. Postoperative hypothyroidism  Follow-up with endocrinology    3. Polyp of colon, unspecified part of colon, unspecified type  Follow-up with repeat colonoscopy 1 year    4. Stage 3a chronic kidney disease ([facility]  With nephrology    5. Polycythemia  We will investigate sleep study per hematology recommendations    6. Sleep-disordered breathing  Due to current symptoms will investigate sleep study for further investigation  - [facility] Sleep Study with PAP Titration; Future      Return in about 3 months (around 12/2/2021), or if symptoms worsen or fail to improve.     Shobha Vincent MD

## 2021-10-06 ENCOUNTER — OFFICE VISIT (OUTPATIENT)
Dept: ENDOCRINOLOGY | Age: 56
End: 2021-10-06
Payer: COMMERCIAL

## 2021-10-06 VITALS
WEIGHT: 293 LBS | DIASTOLIC BLOOD PRESSURE: 85 MMHG | HEART RATE: 82 BPM | SYSTOLIC BLOOD PRESSURE: 117 MMHG | OXYGEN SATURATION: 94 % | BODY MASS INDEX: 47.09 KG/M2 | HEIGHT: 66 IN

## 2021-10-06 DIAGNOSIS — E03.9 HYPOTHYROIDISM, UNSPECIFIED TYPE: Primary | ICD-10-CM

## 2021-10-06 DIAGNOSIS — C73 PAPILLARY THYROID CARCINOMA (HCC): ICD-10-CM

## 2021-10-06 PROCEDURE — 99213 OFFICE O/P EST LOW 20 MIN: CPT | Performed by: INTERNAL MEDICINE

## 2021-10-06 NOTE — PROGRESS NOTES
10/6/2021    Assessment:       Diagnosis Orders   1. Hypothyroidism, unspecified type  T4, Free    TSH with Reflex         PLAN:     Orders Placed This Encounter   Procedures    T4, Free     Standing Status:   Future     Standing Expiration Date:   10/6/2022    TSH with Reflex     Standing Status:   Future     Standing Expiration Date:   10/6/2022    Anti-Thyroglobulin Antibody     Standing Status:   Future     Standing Expiration Date:   10/6/2022    Thyroglobulin     Standing Status:   Future     Standing Expiration Date:   10/6/2022     Continue Synthroid 200 mcg 6 days a week and 300mcg on Sunday    Subjective:     Chief Complaint   Patient presents with    Hypothyroidism     Vitals:    10/06/21 1000   BP: 117/85   Pulse: 82   SpO2: 94%   Weight: (!) 309 lb (140.2 kg)   Height: 5' 6\" (1.676 m)     Wt Readings from Last 3 Encounters:   10/06/21 (!) 309 lb (140.2 kg)   09/02/21 (!) 314 lb (142.4 kg)   07/21/21 (!) 315 lb (142.9 kg)     BP Readings from Last 3 Encounters:   10/06/21 117/85   09/02/21 110/80   07/21/21 126/70     3-month follow-up on hypothyroidism patient on thyroid replacement with Synthroid    Other  This is a chronic problem. The current episode started more than 1 year ago. The problem occurs intermittently. The problem has been waxing and waning. Associated symptoms include fatigue. Treatments tried: Synthroid. The treatment provided moderate relief. Results for Mohan Partida (MRN [id number]) as of 10/11/2021 07:49   Ref.  Range 6/29/2021 12:28 6/29/2021 12:30 7/21/2021 08:31 8/3/2021 13:36 8/3/2021 13:38 10/4/2021 17:17   TSH Latest Ref Range: 0.440 - 3.860 uIU/mL  0.607    0.189 (L)   T4 Free Latest Ref Range: 0.84 - 1.68 ng/dL  1.67    2.07 (H)       Past Medical History:   Diagnosis Date    Atrial fibrillation ([address]     Hypertension     PE (pulmonary thromboembolism) ([facility] 11/2020    Ventricular tachycardia ([facility]      Past Surgical History:   Procedure Laterality Date    APPENDECTOMY      CARDIOVERSION  2021    CHOLECYSTECTOMY      COLONOSCOPY N/A 2021    COLONOSCOPY DIAGNOSTIC WITH POLYPECTOMY performed by Abby Begum MD at [address]      TONSILLECTOMY AND ADENOIDECTOMY      TUBAL LIGATION       Social History     Socioeconomic History    Marital status:      Spouse name: Not on file    Number of children: Not on file    Years of education: Not on file    Highest education level: Not on file   Occupational History    Not on file   Tobacco Use    Smoking status: Former Smoker     Packs/day: 1.50     Years: 35.00     Pack years: 52.50     Types: Cigarettes     Start date:      Quit date:      Years since quittin.7    Smokeless tobacco: Never Used   Vaping Use    Vaping Use: Never used   Substance and Sexual Activity    Alcohol use: Not Currently    Drug use: Never    Sexual activity: Not on file   Other Topics Concern    Not on file   Social History Narrative    Not on file     Social Determinants of Health     Financial Resource Strain: Low Risk     Difficulty of Paying Living Expenses: Not hard at all   Food Insecurity: No Food Insecurity    Worried About [facility] in the Last Year: Never true    [address] [facility] [address] in the Last Year: Never true   Transportation Needs: No Transportation Needs    Lack of Transportation (Medical): No    Lack of Transportation (Non-Medical):  No   Physical Activity:     Days of Exercise per Week:     Minutes of Exercise per Session:    Stress:     Feeling of Stress :    Social Connections:     Frequency of Communication with Friends and Family:     Frequency of Social Gatherings with Friends and Family:     Attends Christianity Services:     Active Member of Clubs or Organizations:     Attends Club or Organization Meetings:     Marital Status:    Intimate Partner Violence:     Fear of Current or Ex-Partner:     Emotionally Abused:  Physically Abused:     Sexually Abused:      Family History   Problem Relation Age of Onset    Stroke Mother         CVA    Diabetes Mother     Heart Disease Father     Heart Failure Sister     Colon Cancer Neg Hx      Allergies   Allergen Reactions    Lisinopril Other (See Comments)       Current Outpatient Medications:     gabapentin (NEURONTIN) 100 MG capsule, Take 1 capsule by mouth daily for 180 days.  Intended supply: 90 days, Disp: 30 capsule, Rfl: 5    metoprolol succinate (TOPROL XL) 100 MG extended release tablet, Take 1.5 tablets by mouth 2 times daily, Disp: 270 tablet, Rfl: 2    atorvastatin (LIPITOR) 40 MG tablet, Take 1 tablet by mouth daily, Disp: 90 tablet, Rfl: 3    hydroCHLOROthiazide (HYDRODIURIL) 12.5 MG tablet, Take 1 tablet by mouth daily, Disp: 90 tablet, Rfl: 3    aspirin 81 MG EC tablet, Take 81 mg by mouth daily, Disp: , Rfl:     pyridoxine (B-6) 100 MG tablet, Take 50 mg by mouth daily, Disp: , Rfl:     levothyroxine (SYNTHROID) 200 MCG tablet, 1 po 6 days weeks and 1 1/2 po Sunday (Patient taking differently: Take 200 mcg by mouth Daily 1 po 6 days weeks and 1 1/2 po Sunday), Disp: 50 tablet, Rfl: 11    Cyanocobalamin (B-12) 1000 MCG SUBL, Place under the tongue, Disp: , Rfl:     folic acid (FOLVITE) 459 MCG tablet, Take 800 mcg by mouth daily, Disp: , Rfl:     apixaban (ELIQUIS) 5 MG TABS tablet, Take by mouth 2 times daily, Disp: , Rfl:   Lab Results   Component Value Date     10/04/2021    K 4.2 10/04/2021    CL 99 10/04/2021    CO2 33 (H) 10/04/2021    BUN 25 (H) 10/04/2021    CREATININE 1.27 (H) 10/04/2021    GLUCOSE 85 10/04/2021    CALCIUM 9.9 10/04/2021    PROT 7.1 05/27/2021    LABALBU 3.8 06/03/2021    BILITOT 0.5 05/27/2021    ALKPHOS 88 05/27/2021    AST 30 05/27/2021    ALT 23 05/27/2021    LABGLOM 43.6 (L) 10/04/2021    GFRAA 52.8 (L) 10/04/2021    GLOB 3.6 (H) 05/27/2021     Lab Results   Component Value Date    WBC 7.8 06/03/2021    HGB 16.9 (H) 06/03/2021    HCT 51.0 (H) 06/03/2021    MCV 96.2 06/03/2021     06/03/2021     Lab Results   Component Value Date    LABA1C 6.2 (H) 06/29/2021     Lab Results   Component Value Date    HDL 40 09/30/2020    LDLCALC 87 09/30/2020    CHOL 165 09/30/2020    TRIG 192 09/30/2020     No results found for: TESTM  Lab Results   Component Value Date    TSH 0.607 06/29/2021    TSHREFLEX 0.189 (L) 10/04/2021    TSHREFLEX 2.050 03/10/2021    T4FREE 2.07 (H) 10/04/2021    T4FREE 1.67 06/29/2021    T4FREE 1.61 03/10/2021     No results found for: TPOABS    Review of Systems   Constitutional: Positive for fatigue. Cardiovascular: Positive for palpitations. All other systems reviewed and are negative. Objective:   Physical Exam  Vitals reviewed. Constitutional:       Appearance: Normal appearance. She is obese. HENT:      Head: Normocephalic and atraumatic. Hair is normal.      Right Ear: External ear normal.      Left Ear: External ear normal.      Nose: Nose normal.   Eyes:      General: No scleral icterus. Right eye: No discharge. Left eye: No discharge. Extraocular Movements: Extraocular movements intact. Conjunctiva/sclera: Conjunctivae normal.   Neck:      Trachea: Trachea normal.   Cardiovascular:      Rate and Rhythm: Normal rate. Pulmonary:      Effort: Pulmonary effort is normal.   Musculoskeletal:         General: Normal range of motion. Cervical back: Normal range of motion and neck supple. Neurological:      General: No focal deficit present. Mental Status: She is alert and oriented to person, place, and time.    Psychiatric:         Mood and Affect: Mood normal.         Behavior: Behavior normal.

## 2021-10-20 ENCOUNTER — OFFICE VISIT (OUTPATIENT)
Dept: CARDIOLOGY CLINIC | Age: 56
End: 2021-10-20
Payer: COMMERCIAL

## 2021-10-20 VITALS
HEART RATE: 90 BPM | HEIGHT: 66 IN | SYSTOLIC BLOOD PRESSURE: 123 MMHG | WEIGHT: 293 LBS | DIASTOLIC BLOOD PRESSURE: 77 MMHG | BODY MASS INDEX: 47.09 KG/M2 | OXYGEN SATURATION: 96 %

## 2021-10-20 DIAGNOSIS — I25.10 CORONARY ARTERY DISEASE INVOLVING NATIVE CORONARY ARTERY OF NATIVE HEART WITHOUT ANGINA PECTORIS: ICD-10-CM

## 2021-10-20 DIAGNOSIS — I82.533 CHRONIC DEEP VEIN THROMBOSIS (DVT) OF POPLITEAL VEIN OF BOTH LOWER EXTREMITIES (HCC): ICD-10-CM

## 2021-10-20 DIAGNOSIS — I10 ESSENTIAL HYPERTENSION: ICD-10-CM

## 2021-10-20 DIAGNOSIS — E78.5 DYSLIPIDEMIA: ICD-10-CM

## 2021-10-20 DIAGNOSIS — I26.02 SADDLE EMBOLUS OF PULMONARY ARTERY WITH ACUTE COR PULMONALE, UNSPECIFIED CHRONICITY (HCC): ICD-10-CM

## 2021-10-20 DIAGNOSIS — I48.91 ATRIAL FIBRILLATION, UNSPECIFIED TYPE (HCC): ICD-10-CM

## 2021-10-20 DIAGNOSIS — I48.0 PAF (PAROXYSMAL ATRIAL FIBRILLATION) (HCC): Primary | ICD-10-CM

## 2021-10-20 PROCEDURE — 99214 OFFICE O/P EST MOD 30 MIN: CPT | Performed by: INTERNAL MEDICINE

## 2021-10-20 ASSESSMENT — ENCOUNTER SYMPTOMS
EYES NEGATIVE: 1
NAUSEA: 0
GASTROINTESTINAL NEGATIVE: 1
SHORTNESS OF BREATH: 1
CHEST TIGHTNESS: 0
WHEEZING: 0
BLOOD IN STOOL: 0
STRIDOR: 0
COUGH: 0

## 2021-10-20 NOTE — PROGRESS NOTES
OFFICE VISIT         Patient: Dion Blue  YOB: 1965  MRN: [id number]    Chief Complaint: DVT PE AF REBOLLEDO   Chief Complaint   Patient presents with    Follow-up     4 MONTH. PT HAS NO COMPLAINTS AT THIS TIME. CV Data:  11/2020 Unprovoked B/L DVT and Saddle Embolism   Thyroid cancer s/p Thyroidectomy   4/21 SPECT abn anterior   5/12/21 Cath LAD - Aneurysmal dilation with moderate sequential lesions 50-60 and  IFR negative. EF 60       Subjective/HPI pt is SOB with any ADLs. No cp currently. Compliant with all meds. No bleed. No falls. haad AF since PE. Was seeing Dr. Cesario Austin but changing. There were plans for CVN and Antiarrhythmic    3/23/21 still winded with ADLs. No pain. Went back into AF. No cp tired. 5/3/21 still REBOLLEDO no cp no bleed. No falls takes meds    6/23/21 still Rebolledo no cp takes meds. No falls no bleed. Having pain with heel spurrs. 10/20/21 DOING WELL NO CP NO SOB No falls no bleed takes meds. Discomfort from heel spurs.      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  Former smoker  No ETOH  Lives with  and kids  ++FH     EKG: AF 80 QTc 477 ms    Past Medical History:   Diagnosis Date    Atrial fibrillation ([address]     Hypertension     PE (pulmonary thromboembolism) ([address] 11/2020    [facility]        Past Surgical History:   Procedure Laterality Date    APPENDECTOMY      CARDIOVERSION  03/16/2021    CHOLECYSTECTOMY      COLONOSCOPY N/A 7/21/2021    COLONOSCOPY DIAGNOSTIC WITH POLYPECTOMY performed by Fiorella Grimaldo MD at [address]      TONSILLECTOMY AND ADENOIDECTOMY      TUBAL LIGATION         Family History   Problem Relation Age of Onset    Stroke Mother         CVA    Diabetes Mother     Heart Disease Father     Heart Failure Sister     Colon Cancer Neg Hx        Social History     Socioeconomic History    Marital status:      Spouse name: None    Number of children: None    Years of 1 tablet by mouth daily 90 tablet 3    hydroCHLOROthiazide (HYDRODIURIL) 12.5 MG tablet Take 1 tablet by mouth daily 90 tablet 3    aspirin 81 MG EC tablet Take 81 mg by mouth daily      pyridoxine (B-6) 100 MG tablet Take 50 mg by mouth daily      levothyroxine (SYNTHROID) 200 MCG tablet 1 po 6 days weeks and 1 1/2 po Sunday (Patient taking differently: Take 200 mcg by mouth Daily 1 po 6 days weeks and 1 1/2 po Sunday) 50 tablet 11    Cyanocobalamin (B-12) 1000 MCG SUBL Place under the tongue      folic acid (FOLVITE) 770 MCG tablet Take 800 mcg by mouth daily      apixaban (ELIQUIS) 5 MG TABS tablet Take by mouth 2 times daily       No current facility-administered medications for this visit. Review of Systems:   Review of Systems   Constitutional: Negative. Negative for diaphoresis and fatigue. HENT: Negative. Eyes: Negative. Respiratory: Positive for shortness of breath. Negative for cough, chest tightness, wheezing and stridor. Cardiovascular: Negative. Negative for chest pain, palpitations and leg swelling. Gastrointestinal: Negative. Negative for blood in stool and nausea. Genitourinary: Negative. Musculoskeletal: Negative. Skin: Negative. Neurological: Negative. Negative for dizziness, syncope, weakness and light-headedness. Hematological: Negative. Psychiatric/Behavioral: Negative. Physical Examination:    /77 (Site: Right Upper Arm, Position: Sitting, Cuff Size: Large Adult)   Pulse 90   Ht 5' 6\" (1.676 m)   Wt (!) 304 lb (137.9 kg)   SpO2 96%   BMI 49.07 kg/m²    Physical Exam   Constitutional: She appears healthy. No distress. HENT:   Normal cephalic and Atraumatic   Eyes: Pupils are equal, round, and reactive to light. Neck: Thyroid normal. No JVD present. No neck adenopathy. No thyromegaly present. Cardiovascular: Normal rate, intact distal pulses and normal pulses. An irregularly irregular rhythm present.    Murmur heard.  Pulmonary/Chest: Effort normal and breath sounds normal. She has no wheezes. She has no rales. She exhibits no tenderness. Abdominal: Soft. Bowel sounds are normal. There is no abdominal tenderness. Musculoskeletal:         General: No tenderness or edema. Normal range of motion. Cervical back: Normal range of motion and neck supple. Neurological: She is alert and oriented to person, place, and time. Skin: Skin is warm. No cyanosis. Nails show no clubbing.        LABS:  CBC:   Lab Results   Component Value Date    WBC 7.8 06/03/2021    RBC 5.30 06/03/2021    HGB 16.9 06/03/2021    HCT 51.0 06/03/2021    MCV 96.2 06/03/2021    MCH 31.9 06/03/2021    MCHC 33.1 06/03/2021    RDW 14.5 06/03/2021     06/03/2021     Lipids:  Lab Results   Component Value Date    CHOL 165 09/30/2020     Lab Results   Component Value Date    TRIG 192 09/30/2020     Lab Results   Component Value Date    HDL 40 09/30/2020     Lab Results   Component Value Date    LDLCALC 87 09/30/2020     Lab Results   Component Value Date    VLDL 38 09/30/2020     No results found for: CHOLHDLRATIO  CMP:    Lab Results   Component Value Date     10/04/2021    K 4.2 10/04/2021    CL 99 10/04/2021    CO2 33 10/04/2021    BUN 25 10/04/2021    CREATININE 1.27 10/04/2021    GFRAA 52.8 10/04/2021    LABGLOM 43.6 10/04/2021    GLUCOSE 85 10/04/2021    PROT 7.1 05/27/2021    LABALBU 3.8 06/03/2021    CALCIUM 9.9 10/04/2021    BILITOT 0.5 05/27/2021    ALKPHOS 88 05/27/2021    AST 30 05/27/2021    ALT 23 05/27/2021     BMP:    Lab Results   Component Value Date     10/04/2021    K 4.2 10/04/2021    CL 99 10/04/2021    CO2 33 10/04/2021    BUN 25 10/04/2021    LABALBU 3.8 06/03/2021    CREATININE 1.27 10/04/2021    CALCIUM 9.9 10/04/2021    GFRAA 52.8 10/04/2021    LABGLOM 43.6 10/04/2021    GLUCOSE 85 10/04/2021     Magnesium:    Lab Results   Component Value Date    MG 2.1 03/10/2021     TSH:  Lab Results   Component Value Date TSH 0.607 06/29/2021             Patient Active Problem List   Diagnosis    Postoperative hypothyroidism    Papillary thyroid carcinoma (HCC)    PAF (paroxysmal atrial fibrillation) (HCC)    Abnormal stress test    Polyp of colon       There are no discontinued medications. Modified Medications    No medications on file       No orders of the defined types were placed in this encounter. Assessment/Plan:    1. Essential hypertension   stable - continue meds. Low salt diet    2. Atrial fibrillation, unspecified type ([address]   NOAC. - rate control   3. 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 Chronic deep vein thrombosis (DVT) of popliteal vein of both lower extremities (HCC)       5. TONG (dyspnea on exertion) -  Stable     6. CAD - mod LAD. IFR negative. Add Statin. - continue cv meds. Need to loose weight. Check fasting labs now. Counseling:  Heart Healthy Lifestyle, Improve BMI, Low Salt Diet, Take Precautions to Prevent Falls and Walk Daily    Return in about 4 months (around 2/20/2022).     Electronically signed by Ruth Busch MD on 10/20/2021 at 3:53 PM

## 2021-10-27 DIAGNOSIS — E78.5 DYSLIPIDEMIA: ICD-10-CM

## 2021-10-27 DIAGNOSIS — I25.10 CORONARY ARTERY DISEASE INVOLVING NATIVE CORONARY ARTERY OF NATIVE HEART WITHOUT ANGINA PECTORIS: ICD-10-CM

## 2021-10-27 LAB
ALBUMIN SERPL-MCNC: 4 G/DL (ref 3.5–4.6)
ALP BLD-CCNC: 129 U/L (ref 40–130)
ALT SERPL-CCNC: 31 U/L (ref 0–33)
ANION GAP SERPL CALCULATED.3IONS-SCNC: 11 MEQ/L (ref 9–15)
AST SERPL-CCNC: 29 U/L (ref 0–35)
BILIRUB SERPL-MCNC: 0.9 MG/DL (ref 0.2–0.7)
BUN BLDV-MCNC: 16 MG/DL (ref 6–20)
CALCIUM SERPL-MCNC: 9.8 MG/DL (ref 8.5–9.9)
CHLORIDE BLD-SCNC: 101 MEQ/L (ref 95–107)
CHOLESTEROL, FASTING: 98 MG/DL (ref 0–199)
CO2: 31 MEQ/L (ref 20–31)
CREAT SERPL-MCNC: 1.29 MG/DL (ref 0.5–0.9)
GFR AFRICAN AMERICAN: 51.8
GFR NON-AFRICAN AMERICAN: 42.8
GLOBULIN: 3 G/DL (ref 2.3–3.5)
GLUCOSE BLD-MCNC: 95 MG/DL (ref 70–99)
HCT VFR BLD CALC: 51.8 % (ref 37–47)
HDLC SERPL-MCNC: 42 MG/DL (ref 40–59)
HEMOGLOBIN: 17.2 G/DL (ref 12–16)
LDL CHOLESTEROL CALCULATED: 36 MG/DL (ref 0–129)
MCH RBC QN AUTO: 31.7 PG (ref 27–31.3)
MCHC RBC AUTO-ENTMCNC: 33.2 % (ref 33–37)
MCV RBC AUTO: 95.5 FL (ref 82–100)
PDW BLD-RTO: 14.5 % (ref 11.5–14.5)
PLATELET # BLD: 213 K/UL (ref 130–400)
POTASSIUM SERPL-SCNC: 3.8 MEQ/L (ref 3.4–4.9)
RBC # BLD: 5.43 M/UL (ref 4.2–5.4)
SODIUM BLD-SCNC: 143 MEQ/L (ref 135–144)
TOTAL PROTEIN: 7 G/DL (ref 6.3–8)
TRIGLYCERIDE, FASTING: 99 MG/DL (ref 0–150)
WBC # BLD: 6.3 K/UL (ref 4.8–10.8)

## 2022-01-10 DIAGNOSIS — E03.9 HYPOTHYROIDISM, UNSPECIFIED TYPE: ICD-10-CM

## 2022-01-11 RX ORDER — GABAPENTIN 100 MG/1
100 CAPSULE ORAL DAILY
Qty: 30 CAPSULE | Refills: 3 | Status: SHIPPED | OUTPATIENT
Start: 2022-01-11 | End: 2022-05-16 | Stop reason: SDUPTHER

## 2022-02-02 ENCOUNTER — OFFICE VISIT (OUTPATIENT)
Dept: ENDOCRINOLOGY | Age: 57
End: 2022-02-02
Payer: COMMERCIAL

## 2022-02-02 VITALS
SYSTOLIC BLOOD PRESSURE: 132 MMHG | HEIGHT: 66 IN | HEART RATE: 88 BPM | DIASTOLIC BLOOD PRESSURE: 94 MMHG | OXYGEN SATURATION: 96 % | WEIGHT: 293 LBS | BODY MASS INDEX: 47.09 KG/M2

## 2022-02-02 DIAGNOSIS — C73 PAPILLARY THYROID CARCINOMA (HCC): ICD-10-CM

## 2022-02-02 DIAGNOSIS — E03.9 HYPOTHYROIDISM, UNSPECIFIED TYPE: Primary | ICD-10-CM

## 2022-02-02 PROCEDURE — 3017F COLORECTAL CA SCREEN DOC REV: CPT | Performed by: INTERNAL MEDICINE

## 2022-02-02 PROCEDURE — G8417 CALC BMI ABV UP PARAM F/U: HCPCS | Performed by: INTERNAL MEDICINE

## 2022-02-02 PROCEDURE — 99213 OFFICE O/P EST LOW 20 MIN: CPT | Performed by: INTERNAL MEDICINE

## 2022-02-02 PROCEDURE — G8427 DOCREV CUR MEDS BY ELIG CLIN: HCPCS | Performed by: INTERNAL MEDICINE

## 2022-02-02 PROCEDURE — 1036F TOBACCO NON-USER: CPT | Performed by: INTERNAL MEDICINE

## 2022-02-02 PROCEDURE — G8484 FLU IMMUNIZE NO ADMIN: HCPCS | Performed by: INTERNAL MEDICINE

## 2022-02-02 RX ORDER — LEVOTHYROXINE SODIUM 0.2 MG/1
TABLET ORAL
Qty: 90 TABLET | Refills: 3 | Status: SHIPPED | OUTPATIENT
Start: 2022-02-02 | End: 2022-11-02 | Stop reason: SDUPTHER

## 2022-02-02 NOTE — PROGRESS NOTES
2022    Assessment:       Diagnosis Orders   1. Hypothyroidism, unspecified type  T4, Free    TSH with Reflex   2. Papillary thyroid carcinoma (HCC)  Thyroglobulin    Anti-Thyroglobulin Antibody         PLAN:     Orders Placed This Encounter   Procedures    T4, Free     Standing Status:   Future     Standing Expiration Date:   2023    TSH with Reflex     Standing Status:   Future     Standing Expiration Date:   2023    Thyroglobulin     Standing Status:   Future     Standing Expiration Date:   2023    Anti-Thyroglobulin Antibody     Standing Status:   Future     Standing Expiration Date:   2023     Lower adjust dose  Orders Placed This Encounter   Medications    levothyroxine (SYNTHROID) 200 MCG tablet     Si po daily     Dispense:  90 tablet     Refill:  03       Subjective:     Chief Complaint   Patient presents with    Hypothyroidism     Vitals:    22 1023 22 1025   BP: (!) 132/94 (!) 132/94   Pulse: 88    SpO2: 96%    Weight: (!) 313 lb (142 kg)    Height: 5' 6\" (1.676 m)      Wt Readings from Last 3 Encounters:   22 (!) 313 lb (142 kg)   10/20/21 (!) 304 lb (137.9 kg)   10/06/21 (!) 309 lb (140.2 kg)     BP Readings from Last 3 Encounters:   22 (!) 132/94   10/20/21 123/77   10/06/21 117/85     Follow-up on hypothyroidism status post thyroidectomy on replacement with Synthroid 200 mcg 6 days a week and 300 mcg 1 day a week last labs show slightly suppressed TSH free T4 slightly high  Thyroglobulin level 0.1  Patient does have a history of A. Fib      Other  This is a recurrent problem. The current episode started more than 1 year ago. The problem occurs intermittently. The problem has been waxing and waning. Associated symptoms include fatigue. Treatments tried: Synthroid. The treatment provided moderate relief. Results for Rose Soto (MRN [id number]) as of 2022 08:52   Ref.  Range 2021 10:21   TSH Latest Ref Range: 0.440 - 3.860 uIU/mL 0.146 (L)   T4 Free Latest Ref Range: 0.84 - 1.68 ng/dL 1.75 (H)   Thyroglobulin Ab Latest Ref Range: 0.0 - 4.0 IU/mL <0.9   Thyroglobulin by LC-MS/MS, Serum/Plasma Latest Ref Range: 1.3 - 13.7 ng/mL Not Applicable   THYROGLOBULIN, SERUM OR PLASMA Latest Ref Range: 1.3 - 31.8 ng/mL 0.1 (L)       Past Medical History:   Diagnosis Date    Atrial fibrillation (HCC)     Hypertension     PE [facility] throm[facility]embolism) ([facility] 2020    Ventricular tachycardia (HCC)      Past Surgical History:   Procedure Laterality Date    APPENDECTOMY      CARDIOVERSION  2021    CHOLECYSTECTOMY      COLONOSCOPY N/A 2021    COLONOSCOPY DIAGNOSTIC WITH POLYPECTOMY performed by Renzo Ann MD at [address]      TONSILLECTOMY AND ADENOIDECTOMY      TUBAL LIGATION       Social History     Socioeconomic History    Marital status:      Spouse name: Not on file    Number of children: Not on file    Years of education: Not on file    Highest education level: Not on file   Occupational History    Not on file   Tobacco Use    Smoking status: Former Smoker     Packs/day: 1.50     Years: 35.00     Pack years: 52.50     Types: Cigarettes     Start date:      Quit date:      Years since quittin.0    Smokeless tobacco: Never Used   Vaping Use    Vaping Use: Never used   Substance and Sexual Activity    Alcohol use: Not Currently    Drug use: Never    Sexual activity: Not on file   Other Topics Concern    Not on file   Social History Narrative    Not on file     Social Determinants of Health     Financial Resource Strain: Low Risk     Difficulty of Paying Living Expenses: Not hard at all   Food Insecurity: No Food Insecurity    Worried About Running Out of Food in the Last Year: Never true    Josefina of Food in the Last Year: Never true   Transportation Needs: No Transportation Needs    Lack of Transportation (Medical):  No    Lack of Transportation (Non-Medical): No   Physical Activity:     Days of Exercise per Week: Not on file    Minutes of Exercise per Session: Not on file   Stress:     Feeling of Stress : Not on file   Social Connections:     Frequency of Communication with Friends and Family: Not on file    Frequency of Social Gatherings with Friends and Family: Not on file    Attends Sabianist Services: Not on file    Active Member of [address] [place] [address] or Organizations: Not on file    Attends Club or Organization Meetings: Not on file    Marital Status: Not on file   Intimate Partner Violence:     Fear of Current or Ex-Partner: Not on file    Emotionally Abused: Not on file    Physically Abused: Not on file    Sexually Abused: Not on file   Housing Stability:     Unable to Pay for Housing in the Last Year: Not on file    Number of [place] in the Last Year: Not on file    Unstable Housing in the Last Year: Not on file     Family History   Problem Relation Age of Onset    Stroke Mother         CVA    Diabetes Mother     Heart Disease Father     Heart Failure Sister     Colon Cancer Neg Hx      Allergies   Allergen Reactions    Lisinopril Other (See Comments)     COUGHING       Current Outpatient Medications:     gabapentin (NEURONTIN) 100 MG capsule, Take 1 capsule by mouth daily for 180 days.  Intended supply: 90 days, Disp: 30 capsule, Rfl: 3    metoprolol succinate (TOPROL XL) 100 MG extended release tablet, Take 1.5 tablets by mouth 2 times daily, Disp: 270 tablet, Rfl: 2    atorvastatin (LIPITOR) 40 MG tablet, Take 1 tablet by mouth daily, Disp: 90 tablet, Rfl: 3    hydroCHLOROthiazide (HYDRODIURIL) 12.5 MG tablet, Take 1 tablet by mouth daily, Disp: 90 tablet, Rfl: 3    aspirin 81 MG EC tablet, Take 81 mg by mouth daily, Disp: , Rfl:     pyridoxine (B-6) 100 MG tablet, Take 50 mg by mouth daily, Disp: , Rfl:     levothyroxine (SYNTHROID) 200 MCG tablet, 1 po 6 days weeks and 1 1/2 po Sunday (Patient taking differently: Take 200 mcg by mouth Daily 1 po 6 days weeks and 1 1/2 po Sunday), Disp: 50 tablet, Rfl: 11    Cyanocobalamin (B-12) 1000 MCG SUBL, Place under the tongue, Disp: , Rfl:     folic acid (FOLVITE) 316 MCG tablet, Take 800 mcg by mouth daily, Disp: , Rfl:     apixaban (ELIQUIS) 5 MG TABS tablet, Take by mouth 2 times daily, Disp: , Rfl:   Lab Results   Component Value Date     10/27/2021    K 3.8 10/27/2021     10/27/2021    CO2 31 10/27/2021    BUN 16 10/27/2021    CREATININE 1.29 (H) 10/27/2021    GLUCOSE 95 10/27/2021    CALCIUM 9.8 10/27/2021    PROT 7.0 10/27/2021    LABALBU 4.0 10/27/2021    BILITOT 0.9 (H) 10/27/2021    ALKPHOS 129 10/27/2021    AST 29 10/27/2021    ALT 31 10/27/2021    LABGLOM 42.8 (L) 10/27/2021    GFRAA 51.8 (L) 10/27/2021    GLOB 3.0 10/27/2021     Lab Results   Component Value Date    WBC 6.3 10/27/2021    HGB 17.2 (H) 10/27/2021    HCT 51.8 (H) 10/27/2021    MCV 95.5 10/27/2021     10/27/2021     Lab Results   Component Value Date    LABA1C 6.2 (H) 06/29/2021     Lab Results   Component Value Date    CHOLFAST 98 10/27/2021    TRIGLYCFAST 99 10/27/2021    HDL 42 10/27/2021    HDL 40 09/30/2020    LDLCALC 36 10/27/2021    LDLCALC 87 09/30/2020    CHOL 165 09/30/2020    TRIG 192 09/30/2020     No results found for: TESTM  Lab Results   Component Value Date    TSH 0.607 06/29/2021    TSHREFLEX 0.146 (L) 12/31/2021    TSHREFLEX 0.189 (L) 10/04/2021    TSHREFLEX 2.050 03/10/2021    T4FREE 1.75 (H) 12/31/2021    T4FREE 2.07 (H) 10/04/2021    T4FREE 1.67 06/29/2021     No results found for: TPOABS    Review of Systems   Constitutional: Positive for fatigue. Eyes: Negative. Cardiovascular: Negative. Endocrine: Negative. Hematological: Negative. All other systems reviewed and are negative. Objective:   Physical Exam  Vitals reviewed. Constitutional:       Appearance: Normal appearance. She is obese.    HENT:      Head: Normocephalic and atraumatic. Hair is normal.      Right Ear: External ear normal.      Left Ear: External ear normal.      Nose: Nose normal.   Eyes:      General: No scleral icterus. Right eye: No discharge. Left eye: No discharge. Extraocular Movements: Extraocular movements intact. Conjunctiva/sclera: Conjunctivae normal.   Neck:      Trachea: Trachea normal.   Cardiovascular:      Rate and Rhythm: Normal rate. Pulmonary:      Effort: Pulmonary effort is normal.   Musculoskeletal:         General: Normal range of motion. Cervical back: Normal range of motion and neck supple. Neurological:      General: No focal deficit present. Mental Status: She is alert and oriented to person, place, and time.    Psychiatric:         Mood and Affect: Mood normal.         Behavior: Behavior normal.

## 2022-02-13 ASSESSMENT — ENCOUNTER SYMPTOMS: EYES NEGATIVE: 1

## 2022-02-23 ENCOUNTER — OFFICE VISIT (OUTPATIENT)
Dept: CARDIOLOGY CLINIC | Age: 57
End: 2022-02-23
Payer: COMMERCIAL

## 2022-02-23 VITALS
WEIGHT: 293 LBS | HEART RATE: 84 BPM | DIASTOLIC BLOOD PRESSURE: 86 MMHG | BODY MASS INDEX: 50.52 KG/M2 | SYSTOLIC BLOOD PRESSURE: 126 MMHG

## 2022-02-23 DIAGNOSIS — E78.5 DYSLIPIDEMIA: ICD-10-CM

## 2022-02-23 DIAGNOSIS — I48.91 ATRIAL FIBRILLATION, UNSPECIFIED TYPE (HCC): ICD-10-CM

## 2022-02-23 DIAGNOSIS — R06.09 DOE (DYSPNEA ON EXERTION): ICD-10-CM

## 2022-02-23 DIAGNOSIS — I10 ESSENTIAL HYPERTENSION: ICD-10-CM

## 2022-02-23 DIAGNOSIS — I82.533 CHRONIC DEEP VEIN THROMBOSIS (DVT) OF POPLITEAL VEIN OF BOTH LOWER EXTREMITIES (HCC): ICD-10-CM

## 2022-02-23 DIAGNOSIS — I48.0 PAF (PAROXYSMAL ATRIAL FIBRILLATION) (HCC): Primary | ICD-10-CM

## 2022-02-23 DIAGNOSIS — I25.10 CORONARY ARTERY DISEASE INVOLVING NATIVE CORONARY ARTERY OF NATIVE HEART WITHOUT ANGINA PECTORIS: ICD-10-CM

## 2022-02-23 DIAGNOSIS — I26.02 SADDLE EMBOLUS OF PULMONARY ARTERY WITH ACUTE COR PULMONALE, UNSPECIFIED CHRONICITY (HCC): ICD-10-CM

## 2022-02-23 PROCEDURE — G8484 FLU IMMUNIZE NO ADMIN: HCPCS | Performed by: INTERNAL MEDICINE

## 2022-02-23 PROCEDURE — 3017F COLORECTAL CA SCREEN DOC REV: CPT | Performed by: INTERNAL MEDICINE

## 2022-02-23 PROCEDURE — 99214 OFFICE O/P EST MOD 30 MIN: CPT | Performed by: INTERNAL MEDICINE

## 2022-02-23 PROCEDURE — G8427 DOCREV CUR MEDS BY ELIG CLIN: HCPCS | Performed by: INTERNAL MEDICINE

## 2022-02-23 PROCEDURE — 1036F TOBACCO NON-USER: CPT | Performed by: INTERNAL MEDICINE

## 2022-02-23 PROCEDURE — 93000 ELECTROCARDIOGRAM COMPLETE: CPT | Performed by: INTERNAL MEDICINE

## 2022-02-23 PROCEDURE — G8417 CALC BMI ABV UP PARAM F/U: HCPCS | Performed by: INTERNAL MEDICINE

## 2022-02-23 RX ORDER — HYDROCHLOROTHIAZIDE 12.5 MG/1
12.5 TABLET ORAL DAILY
Qty: 90 TABLET | Refills: 3 | Status: SHIPPED | OUTPATIENT
Start: 2022-02-23 | End: 2022-06-29 | Stop reason: SDUPTHER

## 2022-02-23 RX ORDER — METOPROLOL SUCCINATE 100 MG/1
150 TABLET, EXTENDED RELEASE ORAL 2 TIMES DAILY
Qty: 270 TABLET | Refills: 2 | Status: SHIPPED | OUTPATIENT
Start: 2022-02-23 | End: 2022-05-12 | Stop reason: SDUPTHER

## 2022-02-23 RX ORDER — ATORVASTATIN CALCIUM 40 MG/1
40 TABLET, FILM COATED ORAL DAILY
Qty: 90 TABLET | Refills: 3 | Status: SHIPPED | OUTPATIENT
Start: 2022-02-23

## 2022-02-23 ASSESSMENT — ENCOUNTER SYMPTOMS
EYES NEGATIVE: 1
STRIDOR: 0
BLOOD IN STOOL: 0
GASTROINTESTINAL NEGATIVE: 1
SHORTNESS OF BREATH: 1
CHEST TIGHTNESS: 0
COUGH: 0
NAUSEA: 0
WHEEZING: 0

## 2022-02-23 NOTE — PROGRESS NOTES
OFFICE VISIT         Patient: Dianna Crowley  YOB: 1965  MRN: [id number]    Chief Complaint: DVT PE AF TONG   Chief Complaint   Patient presents with    Atrial Fibrillation    Coronary Artery Disease    Hypertension       CV Data:  11/2020 Unprovoked B/L DVT and Saddle Embolism   Thyroid cancer s/p Thyroidectomy   4/21 SPECT abn anterior   5/12/21 Cath LAD - Aneurysmal dilation with moderate sequential lesions 50-60 and  IFR negative. EF 60       Subjective/HPI pt is SOB with any ADLs. No cp currently. Compliant with all meds. No bleed. No falls. haad AF since PE. Was seeing Dr. Cristin Delgado but changing. There were plans for CVN and Antiarrhythmic    3/23/21 still winded with ADLs. No pain. Went back into AF. No cp tired. 5/3/21 still TONG no cp no bleed. No falls takes meds    6/23/21 still Tong no cp takes meds. No falls no bleed. Having pain with heel spurrs. 10/20/21 DOING WELL NO CP NO SOB No falls no bleed takes meds. Discomfort from heel spurs. 2/23/22 doing well no cp no sob she can not sense AF. No bleed. Takes meds.      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  Former smoker  No ETOH  Lives with  and kids  ++FH     EKG: AF 80 QTc 406 ms    Past Medical History:   Diagnosis Date    Atrial fibrillation ([address]     Hypertension     PE (pulmonary thromboembolism) ([address] 11/2020    Vent[facility] tachycardia ([facility] [address]        Past Surgical History:   Procedure Laterality Date    APPENDECTOMY      CARDIOVERSION  03/16/2021    CHOLECYSTECTOMY      COLONOSCOPY N/A 7/21/2021    COLONOSCOPY DIAGNOSTIC WITH POLYPECTOMY performed by Severa Morrow, MD at [address] THYROIDECTOMY      TONSILLECTOMY AND ADENOIDECTOMY      TUBAL LIGATION         Family History   Problem Relation Age of Onset    Stroke Mother         CVA    Diabetes Mother     Heart Disease Father     Heart Failure Sister     Colon Cancer Neg Hx        Social History     Socioeconomic History    Marital status:      Spouse name: Not on file    Number of children: Not on file    Years of education: Not on file    Highest education level: Not on file   Occupational History    Not on file   Tobacco Use    Smoking status: Former Smoker     Packs/day: 1.50     Years: 35.00     Pack years: 52.50     Types: Cigarettes     Start date: 47     Quit date:      Years since quittin.1    Smokeless tobacco: Never Used   Vaping Use    Vaping Use: Never used   Substance and Sexual Activity    Alcohol use: Not Currently    Drug use: Never    Sexual activity: Not on file   Other Topics Concern    Not on file   Social History Narrative    Not on file     Social Determinants of Health     Financial Resource Strain: Low Risk     Difficulty of Paying Living Expenses: Not hard at all   Food Insecurity: No Food Insecurity    Worried About [address] in the Last Year: Never true    [address] [employer]  [employer] in the Last Year: Never true   Transportation Needs: No Transportation Needs    Lack of Transportation (Medical): No    Lack of Transportation (Non-Medical):  No   Physical Activity:     Days of Exercise per Week: Not on file    Minutes of Exercise per Session: Not on file   Stress:     Feeling of Stress : Not on file   Social Connections:     Frequency of Communication with Friends and Family: Not on file    Frequency of Social Gatherings with Friends and Family: Not on file    Attends Shinto Services: Not on file    Active Member of Clubs or Organizations: Not on file    Attends Club or Organization Meetings: Not on file    Marital Status: Not on file   Intimate Partner Violence:     Fear of Current or Ex-Partner: Not on file    Emotionally Abused: Not on file    Physically Abused: Not on file    Sexually Abused: Not on file   Housing Stability:     Unable to Pay for Housing in the Last Year: Not on file    Number of Places Lived in the Last Year: Not on file    Unstable Housing in the Last Year: Not on file       Allergies   Allergen Reactions    Lisinopril Other (See Comments)     COUGHING       Current Outpatient Medications   Medication Sig Dispense Refill    metoprolol succinate (TOPROL XL) 100 MG extended release tablet Take 1.5 tablets by mouth 2 times daily 270 tablet 2    hydroCHLOROthiazide (HYDRODIURIL) 12.5 MG tablet Take 1 tablet by mouth daily 90 tablet 3    atorvastatin (LIPITOR) 40 MG tablet Take 1 tablet by mouth daily 90 tablet 3    levothyroxine (SYNTHROID) 200 MCG tablet 1 po daily 90 tablet 03    gabapentin (NEURONTIN) 100 MG capsule Take 1 capsule by mouth daily for 180 days. Intended supply: 90 days 30 capsule 3    aspirin 81 MG EC tablet Take 81 mg by mouth daily      pyridoxine (B-6) 100 MG tablet Take 50 mg by mouth daily      Cyanocobalamin (B-12) 1000 MCG SUBL Place under the tongue      folic acid (FOLVITE) 059 MCG tablet Take 800 mcg by mouth daily      apixaban (ELIQUIS) 5 MG TABS tablet Take by mouth 2 times daily       No current facility-administered medications for this visit. Review of Systems:   Review of Systems   Constitutional: Negative. Negative for diaphoresis and fatigue. HENT: Negative. Eyes: Negative. Respiratory: Positive for shortness of breath. Negative for cough, chest tightness, wheezing and stridor. Cardiovascular: Negative. Negative for chest pain, palpitations and leg swelling. Gastrointestinal: Negative. Negative for blood in stool and nausea. Genitourinary: Negative. Musculoskeletal: Negative. Skin: Negative. Neurological: Negative. Negative for dizziness, syncope, weakness and light-headedness. Hematological: Negative. Psychiatric/Behavioral: Negative. Physical Examination:    /86 (Site: Left Upper Arm, Position: Sitting, Cuff Size: Large Adult)   Pulse 84   Wt (!) 313 lb (142 kg)   BMI 50.52 kg/m²    Physical Exam   Constitutional: She appears healthy.  No distress. HENT:   Normal cephalic and Atraumatic   Eyes: Pupils are equal, round, and reactive to light. Neck: Thyroid normal. No JVD present. No neck adenopathy. No thyromegaly present. Cardiovascular: Normal rate, intact distal pulses and normal pulses. An irregularly irregular rhythm present. Murmur heard. Pulmonary/Chest: Effort normal and breath sounds normal. She has no wheezes. She has no rales. She exhibits no tenderness. Abdominal: Soft. Bowel sounds are normal. There is no abdominal tenderness. Musculoskeletal:         General: No tenderness or edema. Normal range of motion. Cervical back: Normal range of motion and neck supple. Neurological: She is alert and oriented to person, place, and time. Skin: Skin is warm. No cyanosis. Nails show no clubbing.        LABS:  CBC:   Lab Results   Component Value Date    WBC 6.3 10/27/2021    RBC 5.43 10/27/2021    HGB 17.2 10/27/2021    HCT 51.8 10/27/2021    MCV 95.5 10/27/2021    MCH 31.7 10/27/2021    MCHC 33.2 10/27/2021    RDW 14.5 10/27/2021     10/27/2021     Lipids:  Lab Results   Component Value Date    CHOL 165 09/30/2020     Lab Results   Component Value Date    TRIG 192 09/30/2020     Lab Results   Component Value Date    HDL 42 10/27/2021    HDL 40 09/30/2020     Lab Results   Component Value Date    LDLCALC 36 10/27/2021    LDLCALC 87 09/30/2020     Lab Results   Component Value Date    VLDL 38 09/30/2020     No results found for: CHOLHDLRATIO  CMP:    Lab Results   Component Value Date     10/27/2021    K 3.8 10/27/2021     10/27/2021    CO2 31 10/27/2021    BUN 16 10/27/2021    CREATININE 1.29 10/27/2021    GFRAA 51.8 10/27/2021    LABGLOM 42.8 10/27/2021    GLUCOSE 95 10/27/2021    PROT 7.0 10/27/2021    LABALBU 4.0 10/27/2021    CALCIUM 9.8 10/27/2021    BILITOT 0.9 10/27/2021    ALKPHOS 129 10/27/2021    AST 29 10/27/2021    ALT 31 10/27/2021     BMP:    Lab Results   Component Value Date     10/27/2021 Essential hypertension   stable - continue meds. Low salt diet    2. Atrial fibrillation, unspecified type ([address]   NOAC. - rate control   3. 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 Chronic deep vein thrombosis (DVT) of popliteal vein of both lower extremities (HCC)       5. TONG (dyspnea on exertion) -  Stable     6. CAD - mod LAD. IFR negative. Add Statin. - continue cv meds. Need to loose weight. Check fasting labs now. Counseling:  Heart Healthy Lifestyle, Improve BMI, Low Salt Diet, Take Precautions to Prevent Falls and Walk Daily    Return in about 4 months (around 6/23/2022).     Electronically signed by Radha Mendoza MD on 2/23/2022 at 2:00 PM

## 2022-02-25 ENCOUNTER — TELEPHONE (OUTPATIENT)
Dept: INTERNAL MEDICINE | Age: 57
End: 2022-02-25

## 2022-02-25 DIAGNOSIS — G47.30 SLEEP-DISORDERED BREATHING: Primary | ICD-10-CM

## 2022-02-25 NOTE — TELEPHONE ENCOUNTER
Incoming call from the patient stating she has not heard anything for her sleep study test.  She would like to do a home sleep study. I let the patient know we do send our orders to [employer] that is a company that does the sleep studies. She is interested in doing it this way oppose to going to the hospital for it. She would like to know if her insurance would be covered through this company. I advised her I would call and check with the rep and then let her know. Spoke with Hawa Jaramillo and she stated they do accept the patients insurance. They will contact the patient if there is anything to owe out of pocket. She would like the form faxed to [phone]. Pt is aware a message will be sent to . ATTACHED in the Media is form that needs to be filled out for [employer]. Please call the patient as soon as possible because she was told by her Hematologist that this should be done asap.

## 2022-03-01 NOTE — TELEPHONE ENCOUNTER
Incoming call from pt asking for a update about the home sleep study test.  I advised the pt I will call [facility] and see where they are at with the order.

## 2022-03-03 NOTE — TELEPHONE ENCOUNTER
Pt aware [facility] received everything needed to sched 3/1/22. Pt is now waiting on a call from them to get scheduled.

## 2022-03-07 ENCOUNTER — TELEPHONE (OUTPATIENT)
Dept: FAMILY MEDICINE CLINIC | Age: 57
End: 2022-03-07

## 2022-03-07 NOTE — TELEPHONE ENCOUNTER
Gabrielphi Diaz called back in to let me know medical service company accepts her insurance but not [facility]. She recommended the patient go through [facility] and do the home sleep study through them and then once the results come back we can fax the Auto Pap order to [address] [place] [address]. If she does not want to go through [facility] she will need to pay $250 out of pocket for the test.      Pt made aware of the message. I advised her I would call [facility] to see if her insurance will pay for the Home sleep study through them. Once I call her back with the information she will let me know which one she would like to do. ( [facility], once her results come back and it is recommended that she needs a CPAP, please send Auto Pap for to [facility] since they accept her insurance. I have attached the Auto Pap form to fill out if needed. Scanned into the Media.

## 2022-03-07 NOTE — TELEPHONE ENCOUNTER
Spoke with [facility] and they stated they do accept the patients insurance. States they will contact the patient to make an appointment. Then it may takes 3 weeks to get approval from her insurance. Once they receive the approval they would contact her to make arrangements for it.

## 2022-03-07 NOTE — TELEPHONE ENCOUNTER
Incoming call from the patient stating her insurance does not cover [facility] for her home sleep Study test.    I advised the patient that I would call the rep for the [employer] sleep study. Spoke with the rep and she said she will call me back once this is looked into for the patient. I called and made the patient aware of what was going on. I also let her know if this does not work out I will try to get a hold of her insurance to see where she could go for a home sleep study.

## 2022-03-07 NOTE — TELEPHONE ENCOUNTER
Patient is aware of the information I received from [facility]. States she will think about which one she would like to go through and then call the office back.

## 2022-03-21 NOTE — TELEPHONE ENCOUNTER
Patient called in to let you know she signed up for the Home Sleep Study done by [facility].

## 2022-03-22 NOTE — TELEPHONE ENCOUNTER
Patient spoke with [facility] and they will be mailing her the home sleep study test in 5 to 7 days. Once the results come back and  reviews them and recommends a CPAP ( if needed) please have him fill out the Auto PAP form through [employer]. It was scanned into the  on 3/7/2022.

## 2022-03-29 DIAGNOSIS — I48.0 PAF (PAROXYSMAL ATRIAL FIBRILLATION) (HCC): Primary | ICD-10-CM

## 2022-03-29 NOTE — TELEPHONE ENCOUNTER
Requesting medication refill.  Please approve or deny this request.    Rx requested:  Requested Prescriptions     Pending Prescriptions Disp Refills    apixaban (ELIQUIS) 5 MG TABS tablet 180 tablet 2     Sig: Take 1 tablet by mouth 2 times daily         Last Office Visit:   2/23/2022      Next Visit Date:  Future Appointments   Date Time Provider Salvador Johnson   5/4/2022 10:00 AM Scotty Zeng  S [address]   6/29/2022 12:15 PM Jane Spencer MD [facility]

## 2022-03-31 ENCOUNTER — TELEPHONE (OUTPATIENT)
Dept: FAMILY MEDICINE CLINIC | Age: 57
End: 2022-03-31

## 2022-03-31 NOTE — TELEPHONE ENCOUNTER
Patient called in asking about her results for her home sleep study test? States she was informed that the results were sent over to . ( if patient needs CPAP please send to [facility])  Formed scanned into the Media on 3/7/2022.

## 2022-04-01 DIAGNOSIS — G47.33 OSA (OBSTRUCTIVE SLEEP APNEA): Primary | ICD-10-CM

## 2022-04-01 DIAGNOSIS — G47.30 SLEEP-DISORDERED BREATHING: ICD-10-CM

## 2022-04-28 ENCOUNTER — OFFICE VISIT (OUTPATIENT)
Dept: PULMONOLOGY | Age: 57
End: 2022-04-28
Payer: COMMERCIAL

## 2022-04-28 VITALS
TEMPERATURE: 98.2 F | DIASTOLIC BLOOD PRESSURE: 88 MMHG | OXYGEN SATURATION: 96 % | WEIGHT: 293 LBS | SYSTOLIC BLOOD PRESSURE: 139 MMHG | BODY MASS INDEX: 47.09 KG/M2 | HEART RATE: 102 BPM | HEIGHT: 66 IN

## 2022-04-28 DIAGNOSIS — G47.33 OSA (OBSTRUCTIVE SLEEP APNEA): Primary | ICD-10-CM

## 2022-04-28 DIAGNOSIS — E66.01 MORBID OBESITY (HCC): ICD-10-CM

## 2022-04-28 PROCEDURE — 3017F COLORECTAL CA SCREEN DOC REV: CPT | Performed by: INTERNAL MEDICINE

## 2022-04-28 PROCEDURE — G8417 CALC BMI ABV UP PARAM F/U: HCPCS | Performed by: INTERNAL MEDICINE

## 2022-04-28 PROCEDURE — 99204 OFFICE O/P NEW MOD 45 MIN: CPT | Performed by: INTERNAL MEDICINE

## 2022-04-28 PROCEDURE — G8427 DOCREV CUR MEDS BY ELIG CLIN: HCPCS | Performed by: INTERNAL MEDICINE

## 2022-04-28 PROCEDURE — 1036F TOBACCO NON-USER: CPT | Performed by: INTERNAL MEDICINE

## 2022-04-28 ASSESSMENT — ENCOUNTER SYMPTOMS
VOMITING: 0
SHORTNESS OF BREATH: 0
CHEST TIGHTNESS: 0
WHEEZING: 0
NAUSEA: 0
VOICE CHANGE: 0
DIARRHEA: 0
TROUBLE SWALLOWING: 0
ABDOMINAL PAIN: 0
EYE ITCHING: 0
SORE THROAT: 0
SINUS PRESSURE: 0
RHINORRHEA: 0
EYE DISCHARGE: 0
COUGH: 0

## 2022-04-28 NOTE — PROGRESS NOTES
Subjective:     Jigar Barrett is a 65 y.o. female who complains today of:     Chief Complaint   Patient presents with    New Patient     ALEYDA       HPI  She has HST  done on 3/27/22   an shows ALEYDA. AHI  13.5   CPAP titration study not done. She does not think she can sleep in sleep lab    She is complaining of snoring, no daytime sleepiness and tiredness. No C/o witness apnea. She wakes up with gasping for air. C/o wakes up frequently during sleep . No complaint of morning headache. She does not have restful sleep. She does not take daily naps. She does not fall asleep while watching TV. She does not have a complaint of sleepiness while driving. She does not have history of motor vehicle accident due to falling a sleep while driving. She does not have difficulty falling sleep but  staying asleep. No significant Weight gain in last 6-12 month . No sleep walking  or eating   No sleep onset hallucination. . No sleep paralysis.  No sleep attacks    She has Unprovoked B/L DVT and Saddle Embolism on Eliquis  Thyroid cancer s/p Thyroidectomy , she also has Afib, hypertension        Allergies:  Lisinopril  Past Medical History:   Diagnosis Date    Atrial fibrillation ([address]     Hypertension     PE [facility] 11/2020    Ventricular tachycardia (HCC)      Past Surgical History:   Procedure Laterality Date    APPENDECTOMY      CARDIOVERSION  03/16/2021    CHOLECYSTECTOMY      COLONOSCOPY N/A 7/21/2021    COLONOSCOPY DIAGNOSTIC WITH POLYPECTOMY performed by Beto Walter MD at [address]      TONSILLECTOMY AND ADENOIDECTOMY      TUBAL LIGATION       Family History   Problem Relation Age of Onset    Stroke Mother         CVA    Diabetes Mother     Heart Disease Father     Heart Failure Sister     Colon Cancer Neg Hx      Social History     Socioeconomic History    Marital status:      Spouse name: Not on file  Number of children: Not on file    Years of education: Not on file    Highest education level: Not on file   Occupational History    Not on file   Tobacco Use    Smoking status: Former Smoker     Packs/day: 1.50     Years: 35.00     Pack years: 52.50     Types: Cigarettes     Start date: 47     Quit date: 2014     Years since quittin.3    Smokeless tobacco: Never Used   Vaping Use    Vaping Use: Never used   Substance and Sexual Activity    Alcohol use: Not Currently    Drug use: Never    Sexual activity: Not on file   Other Topics Concern    Not on file   Social History Narrative    Not on file     Social Determinants of Health     Financial Resource Strain:     Difficulty of Paying Living Expenses: Not on file   Food Insecurity:     Worried About [employer] [employer] in the Last Year: Not on file    [employer] in the Last Year: Not on file   Transportation Needs:     Lack of Transportation (Medical): Not on file    Lack of Transportation (Non-Medical):  Not on file   Physical Activity:     Days of Exercise per Week: Not on file    Minutes of Exercise per Session: Not on file   Stress:     Feeling of Stress : Not on file   Social Connections:     Frequency of Communication with Friends and Family: Not on file    Frequency of Social Gatherings with Friends and Family: Not on file    Attends Restorationist Services: Not on file    Active Member of [address] [place] [address] or Organizations: Not on file    Attends Club or Organization Meetings: Not on file    Marital Status: Not on file   Intimate Partner Violence:     Fear of Current or Ex-Partner: Not on file    Emotionally Abused: Not on file    Physically Abused: Not on file    Sexually Abused: Not on file   Housing Stability:     Unable to Pay for Housing in the Last Year: Not on file    Number of [place] in the Last Year: Not on file    Unstable Housing in the Last Year: Not on file         Review of Systems   Constitutional: Negative for chills, diaphoresis, fatigue and fever. HENT: Negative for congestion, mouth sores, nosebleeds, postnasal drip, rhinorrhea, sinus pressure, sneezing, sore throat, trouble swallowing and voice change. Snoring    Eyes: Negative for discharge, itching and visual disturbance. Respiratory: Negative for cough, chest tightness, shortness of breath and wheezing. Cardiovascular: Negative for chest pain, palpitations and leg swelling. Gastrointestinal: Negative for abdominal pain, diarrhea, nausea and vomiting. Genitourinary: Negative for difficulty urinating and hematuria. Musculoskeletal: Negative for arthralgias, joint swelling and myalgias. Skin: Negative for rash. Allergic/Immunologic: Negative for environmental allergies and food allergies. Neurological: Negative for dizziness, tremors, weakness and headaches. Psychiatric/Behavioral: Positive for sleep disturbance. Negative for behavioral problems. :     Vitals:    04/28/22 0943   BP: 139/88   Pulse: 102   Temp: 98.2 °F (36.8 °C)   SpO2: 96%   Weight: (!) 318 lb (144.2 kg)   Height: 5' 6\" (1.676 m)     Wt Readings from Last 3 Encounters:   04/28/22 (!) 318 lb (144.2 kg)   02/23/22 (!) 313 lb (142 kg)   02/02/22 (!) 313 lb (142 kg)         Physical Exam  Constitutional:       General: She is not in acute distress. Appearance: She is well-developed. She is obese. She is not diaphoretic. HENT:      Head: Normocephalic and atraumatic. Nose: Nose normal.      Mouth/Throat:      Comments: Mallampati III   Eyes:      Pupils: Pupils are equal, round, and reactive to light. Neck:      Thyroid: No thyromegaly. Vascular: No JVD. Trachea: No tracheal deviation. Cardiovascular:      Rate and Rhythm: Normal rate and regular rhythm. Heart sounds: No murmur heard. No friction rub. No gallop. Pulmonary:      Effort: No respiratory distress. Breath sounds: No wheezing or rales.    Chest:      Chest wall: No tenderness. Abdominal:      General: There is no distension. Tenderness: There is no abdominal tenderness. There is no rebound. Musculoskeletal:         General: Normal range of motion. Lymphadenopathy:      Cervical: No cervical adenopathy. Skin:     General: Skin is warm and dry. Neurological:      Mental Status: She is alert and oriented to person, place, and time. Coordination: Coordination normal.         Current Outpatient Medications   Medication Sig Dispense Refill    apixaban (ELIQUIS) 5 MG TABS tablet Take 1 tablet by mouth in the morning and at bedtime 180 tablet 1    metoprolol succinate (TOPROL XL) 100 MG extended release tablet Take 1.5 tablets by mouth 2 times daily 270 tablet 2    hydroCHLOROthiazide (HYDRODIURIL) 12.5 MG tablet Take 1 tablet by mouth daily 90 tablet 3    atorvastatin (LIPITOR) 40 MG tablet Take 1 tablet by mouth daily 90 tablet 3    levothyroxine (SYNTHROID) 200 MCG tablet 1 po daily 90 tablet 03    gabapentin (NEURONTIN) 100 MG capsule Take 1 capsule by mouth daily for 180 days. Intended supply: 90 days 30 capsule 3    aspirin 81 MG EC tablet Take 81 mg by mouth daily      pyridoxine (B-6) 100 MG tablet Take 50 mg by mouth daily      Cyanocobalamin (B-12) 1000 MCG SUBL Place under the tongue      folic acid (FOLVITE) 361 MCG tablet Take 800 mcg by mouth daily       No current facility-administered medications for this visit. Assessment/Plan:     1. ALEYDA (obstructive sleep apnea)  She has HST  done on 3/27/22   an shows ALEYDA. AHI  13.5   CPAP titration study not done. She does not think she can sleep in sleep lab  She is complaining of snoring, no daytime sleepiness and tiredness. She wakes up with gasping for air. C/o wakes up frequently during sleep . She does not have restful sleep.  Will request CPAP titration study     - Sleep Study with PAP Titration     Counseling:Driving: She is advised for extreme caution when driving or operating machinery if there is a feeling of drowsiness, especially while driving it is preferable to stop driving and take a brief nap. Sleep hygiene:Avoid supine sleep, sleep on  sides. Avoid  sleep deprivation. Explained sleep hygiene. Advice to avoid Alcohol and sedative    2. Morbid obesity ([address]  She is advised try to lose weight. obesity related risk explained to the patient ,  Current weight:  (!) 318 lb (144.2 kg) Lbs. BMI:  Body mass index is 51.33 kg/m². Suggested weight control approaches, including dietary changes , exercise, behavioral modification. Return in about 4 weeks (around 5/26/2022) for sasha.       Armond Oneill MD

## 2022-05-03 ENCOUNTER — COMMUNITY OUTREACH (OUTPATIENT)
Dept: INTERNAL MEDICINE | Age: 57
End: 2022-05-03

## 2022-05-03 DIAGNOSIS — E03.9 HYPOTHYROIDISM, UNSPECIFIED TYPE: ICD-10-CM

## 2022-05-03 DIAGNOSIS — C73 PAPILLARY THYROID CARCINOMA (HCC): ICD-10-CM

## 2022-05-03 LAB
T4 FREE: 1.67 NG/DL (ref 0.84–1.68)
TSH REFLEX: 0.54 UIU/ML (ref 0.44–3.86)

## 2022-05-03 NOTE — PROGRESS NOTES
Patient's HM shows they are overdue for Mammogram and Low Dose CT. [employer] and  files searched without success. No results to attach to order nor HM updated. Spoke with patient, she has not had these done as yet. Follow up scheduled. Note added to upcoming appointment to discuss Care Gap.

## 2022-05-04 ENCOUNTER — OFFICE VISIT (OUTPATIENT)
Dept: ENDOCRINOLOGY | Age: 57
End: 2022-05-04
Payer: COMMERCIAL

## 2022-05-04 VITALS
BODY MASS INDEX: 47.09 KG/M2 | DIASTOLIC BLOOD PRESSURE: 87 MMHG | OXYGEN SATURATION: 95 % | WEIGHT: 293 LBS | HEIGHT: 66 IN | HEART RATE: 74 BPM | SYSTOLIC BLOOD PRESSURE: 126 MMHG

## 2022-05-04 DIAGNOSIS — C73 PAPILLARY THYROID CARCINOMA (HCC): ICD-10-CM

## 2022-05-04 DIAGNOSIS — E03.9 HYPOTHYROIDISM, UNSPECIFIED TYPE: Primary | ICD-10-CM

## 2022-05-04 PROCEDURE — 1036F TOBACCO NON-USER: CPT | Performed by: INTERNAL MEDICINE

## 2022-05-04 PROCEDURE — 3017F COLORECTAL CA SCREEN DOC REV: CPT | Performed by: INTERNAL MEDICINE

## 2022-05-04 PROCEDURE — 99213 OFFICE O/P EST LOW 20 MIN: CPT | Performed by: INTERNAL MEDICINE

## 2022-05-04 PROCEDURE — G8427 DOCREV CUR MEDS BY ELIG CLIN: HCPCS | Performed by: INTERNAL MEDICINE

## 2022-05-04 PROCEDURE — G8417 CALC BMI ABV UP PARAM F/U: HCPCS | Performed by: INTERNAL MEDICINE

## 2022-05-04 NOTE — PROGRESS NOTES
5/4/2022    Assessment:       Diagnosis Orders   1. Hypothyroidism, unspecified type     2. Papillary thyroid carcinoma (HCC)           PLAN:     Continue Synthroid 200 mcg daily  Orders Placed This Encounter   Procedures    T4, Free     Standing Status:   Future     Standing Expiration Date:   5/4/2023    TSH with Reflex     Standing Status:   Future     Standing Expiration Date:   5/4/2023    Thyroglobulin     Standing Status:   Future     Standing Expiration Date:   5/4/2023    Anti-Thyroglobulin Antibody     Standing Status:   Future     Standing Expiration Date:   5/4/2023       Subjective:     Chief Complaint   Patient presents with    Hypothyroidism     Papillary thyroid carcinoma      Vitals:    05/04/22 1001   BP: 126/87   Pulse: 74   SpO2: 95%   Weight: (!) 318 lb (144.2 kg)   Height: 5' 6\" (1.676 m)     Wt Readings from Last 3 Encounters:   05/04/22 (!) 318 lb (144.2 kg)   04/28/22 (!) 318 lb (144.2 kg)   02/23/22 (!) 313 lb (142 kg)     BP Readings from Last 3 Encounters:   05/04/22 126/87   04/28/22 139/88   02/23/22 126/86     Follow-up on hypothyroidism status post thyroidectomy for papillary thyroid carcinoma thyroglobulin level was 0.1 stable history of heart disease  Continue replacement with levothyroxine 200 mcg daily  Obesity BMI 51    Other  This is a chronic (Hypothyroidism) problem. The current episode started more than 1 year ago. The problem occurs intermittently. The problem has been waxing and waning. Pertinent negatives include no neck pain or swollen glands. Exacerbated by: Thyroidectomy. Treatments tried: Synthroid. The treatment provided moderate relief. Results for Edwina Fields (MRN [id number]) as of 5/8/2022 11:17   Ref.  Range 5/3/2022 11:05   TSH Latest Ref Range: 0.440 - 3.860 uIU/mL 0.536   T4 Free Latest Ref Range: 0.84 - 1.68 ng/dL 1.67   Thyroglobulin Ab Latest Ref Range: 0.0 - 4.0 IU/mL <0.9   Thyroglobulin by LC-MS/MS, Serum/Plasma Latest Ref Range: 1.3 - 31.8 ng/mL Not Applicable   THYROGLOBULIN, SERUM OR PLASMA Latest Ref Range: 1.3 - 31.8 ng/mL 0.1 (L)         Past Medical History:   Diagnosis Date    [facility] fibrillation ([facility]     Hypertension     PE (pulmonary thromboembolism) ([facility] 2020    Ventricular tachycardia (HCC)      Past Surgical History:   Procedure Laterality Date    APPENDECTOMY      CARDIOVERSION  2021    CHOLECYSTECTOMY      COLONOSCOPY N/A 2021    COLONOSCOPY DIAGNOSTIC WITH POLYPECTOMY performed by Edward Ma MD at [address]      TONSILLECTOMY AND ADENOIDECTOMY      TUBAL LIGATION       Social History     Socioeconomic History    Marital status:      Spouse name: Not on file    Number of children: Not on file    Years of education: Not on file    Highest education level: Not on file   Occupational History    Not on file   Tobacco Use    Smoking status: Former Smoker     Packs/day: 1.50     Years: 35.00     Pack years: 52.50     Types: Cigarettes     Start date:      Quit date:      Years since quittin.3    Smokeless tobacco: Never Used   Vaping Use    Vaping Use: Never used   Substance and Sexual Activity    Alcohol use: Not Currently    Drug use: Never    Sexual activity: Not on file   Other Topics Concern    Not on file   Social History Narrative    Not on file     Social Determinants of Health     Financial Resource Strain:     Difficulty of Paying Living Expenses: Not on file   Food Insecurity:     Worried About Running Out of Food in the Last Year: Not on file    [employer] Food in the Last Year: Not on file   Transportation Needs:     Lack of Transportation (Medical): Not on file    Lack of Transportation (Non-Medical):  Not on file   Physical Activity:     Days of Exercise per Week: Not on file    Minutes of Exercise per Session: Not on file   Stress:     Feeling of Stress : Not on file   Social Connections:     Frequency of Communication with Friends and Family: Not on file    Frequency of Social Gatherings with Friends and Family: Not on file    Attends Holiness Services: Not on file    Active Member of Clubs or Organizations: Not on file    Attends Club or Organization Meetings: Not on file    Marital Status: Not on file   Intimate Partner Violence:     Fear of Current or Ex-Partner: Not on file    Emotionally Abused: Not on file    Physically Abused: Not on file    Sexually Abused: Not on file   Housing Stability:     Unable to Pay for Housing in the Last Year: Not on file    Number of [place] in the Last Year: Not on file    Unstable Housing in the Last Year: Not on file     Family History   Problem Relation Age of Onset    Stroke Mother         CVA    Diabetes Mother     Heart Disease Father     Heart Failure Sister     Colon Cancer Neg Hx      Allergies   Allergen Reactions    Lisinopril Other (See Comments)     COUGHING       Current Outpatient Medications:     apixaban (ELIQUIS) 5 MG TABS tablet, Take 1 tablet by mouth in the morning and at bedtime, Disp: 180 tablet, Rfl: 1    metoprolol succinate (TOPROL XL) 100 MG extended release tablet, Take 1.5 tablets by mouth 2 times daily, Disp: 270 tablet, Rfl: 2    hydroCHLOROthiazide (HYDRODIURIL) 12.5 MG tablet, Take 1 tablet by mouth daily, Disp: 90 tablet, Rfl: 3    atorvastatin (LIPITOR) 40 MG tablet, Take 1 tablet by mouth daily, Disp: 90 tablet, Rfl: 3    levothyroxine (SYNTHROID) 200 MCG tablet, 1 po daily, Disp: 90 tablet, Rfl: 03    gabapentin (NEURONTIN) 100 MG capsule, Take 1 capsule by mouth daily for 180 days.  Intended supply: 90 days, Disp: 30 capsule, Rfl: 3    aspirin 81 MG EC tablet, Take 81 mg by mouth daily, Disp: , Rfl:     pyridoxine (B-6) 100 MG tablet, Take 50 mg by mouth daily, Disp: , Rfl:     Cyanocobalamin (B-12) 1000 MCG SUBL, Place under the tongue, Disp: , Rfl:     folic acid (FOLVITE) 905 MCG tablet, Take 800 mcg by mouth daily, Disp: , Rfl:   Lab Results   Component Value Date     03/09/2022    K 4.6 03/09/2022     03/09/2022    CO2 28 03/09/2022    BUN 20 03/09/2022    CREATININE 1.19 (H) 03/09/2022    GLUCOSE 110 (H) 03/09/2022    CALCIUM 9.2 03/09/2022    PROT 7.0 10/27/2021    LABALBU 3.7 03/09/2022    BILITOT 0.9 (H) 10/27/2021    ALKPHOS 129 10/27/2021    AST 29 10/27/2021    ALT 31 10/27/2021    LABGLOM 46.9 (L) 03/09/2022    GFRAA 56.8 (L) 03/09/2022    GLOB 3.0 10/27/2021     Lab Results   Component Value Date    WBC 7.3 02/23/2022    HGB 16.3 (H) 02/23/2022    HCT 48.9 (H) 02/23/2022    MCV 94.7 02/23/2022     02/23/2022     Lab Results   Component Value Date    LABA1C 6.2 (H) 06/29/2021     Lab Results   Component Value Date    CHOLFAST 98 10/27/2021    TRIGLYCFAST 99 10/27/2021    HDL 42 10/27/2021    HDL 40 09/30/2020    LDLCALC 36 10/27/2021    LDLCALC 87 09/30/2020    CHOL 165 09/30/2020    TRIG 192 09/30/2020     No results found for: TESTM  Lab Results   Component Value Date    TSH 0.607 06/29/2021    TSHREFLEX 0.536 05/03/2022    TSHREFLEX 0.146 (L) 12/31/2021    TSHREFLEX 0.189 (L) 10/04/2021    T4FREE 1.67 05/03/2022    T4FREE 1.75 (H) 12/31/2021    T4FREE 2.07 (H) 10/04/2021     No results found for: TPOABS    Review of Systems   Cardiovascular: Negative. Endocrine: Negative. Musculoskeletal: Negative for neck pain. Hematological: Negative. All other systems reviewed and are negative. Objective:   Physical Exam  Vitals reviewed. Constitutional:       Appearance: Normal appearance. She is obese. HENT:      Head: Normocephalic and atraumatic. Right Ear: External ear normal.      Left Ear: External ear normal.      Nose: Nose normal.   Eyes:      General: No scleral icterus. Right eye: No discharge. Left eye: No discharge. Extraocular Movements: Extraocular movements intact.       Conjunctiva/sclera: Conjunctivae normal.   Cardiovascular: Rate and Rhythm: Normal rate. Pulmonary:      Effort: Pulmonary effort is normal.   Musculoskeletal:         General: Normal range of motion. Cervical back: Normal range of motion and neck supple. Neurological:      General: No focal deficit present. Mental Status: She is alert and oriented to person, place, and time.    Psychiatric:         Mood and Affect: Mood normal.         Behavior: Behavior normal.

## 2022-05-08 ASSESSMENT — ENCOUNTER SYMPTOMS: SWOLLEN GLANDS: 0

## 2022-05-10 ENCOUNTER — OFFICE VISIT (OUTPATIENT)
Dept: FAMILY MEDICINE CLINIC | Age: 57
End: 2022-05-10
Payer: COMMERCIAL

## 2022-05-10 VITALS
DIASTOLIC BLOOD PRESSURE: 86 MMHG | TEMPERATURE: 96.6 F | WEIGHT: 293 LBS | BODY MASS INDEX: 51.36 KG/M2 | SYSTOLIC BLOOD PRESSURE: 130 MMHG | HEART RATE: 84 BPM | OXYGEN SATURATION: 98 %

## 2022-05-10 DIAGNOSIS — G47.33 OSA (OBSTRUCTIVE SLEEP APNEA): ICD-10-CM

## 2022-05-10 DIAGNOSIS — E89.0 POSTOPERATIVE HYPOTHYROIDISM: ICD-10-CM

## 2022-05-10 DIAGNOSIS — Z11.59 ENCOUNTER FOR HEPATITIS C SCREENING TEST FOR LOW RISK PATIENT: ICD-10-CM

## 2022-05-10 DIAGNOSIS — R42 VERTIGO: Primary | ICD-10-CM

## 2022-05-10 DIAGNOSIS — I48.0 PAF (PAROXYSMAL ATRIAL FIBRILLATION) (HCC): ICD-10-CM

## 2022-05-10 DIAGNOSIS — N18.31 STAGE 3A CHRONIC KIDNEY DISEASE (HCC): ICD-10-CM

## 2022-05-10 DIAGNOSIS — Z12.31 BREAST CANCER SCREENING BY MAMMOGRAM: ICD-10-CM

## 2022-05-10 PROCEDURE — 99214 OFFICE O/P EST MOD 30 MIN: CPT | Performed by: FAMILY MEDICINE

## 2022-05-10 PROCEDURE — 1036F TOBACCO NON-USER: CPT | Performed by: FAMILY MEDICINE

## 2022-05-10 PROCEDURE — G8417 CALC BMI ABV UP PARAM F/U: HCPCS | Performed by: FAMILY MEDICINE

## 2022-05-10 PROCEDURE — 3017F COLORECTAL CA SCREEN DOC REV: CPT | Performed by: FAMILY MEDICINE

## 2022-05-10 PROCEDURE — G8427 DOCREV CUR MEDS BY ELIG CLIN: HCPCS | Performed by: FAMILY MEDICINE

## 2022-05-10 RX ORDER — LOSARTAN POTASSIUM 25 MG/1
TABLET ORAL
COMMUNITY
Start: 2022-04-30

## 2022-05-10 RX ORDER — MECLIZINE HCL 12.5 MG/1
12.5 TABLET ORAL 3 TIMES DAILY PRN
Qty: 30 TABLET | Refills: 0 | Status: SHIPPED | OUTPATIENT
Start: 2022-05-10 | End: 2022-05-20

## 2022-05-10 SDOH — ECONOMIC STABILITY: FOOD INSECURITY: WITHIN THE PAST 12 MONTHS, YOU WORRIED THAT YOUR FOOD WOULD RUN OUT BEFORE YOU GOT MONEY TO BUY MORE.: NEVER TRUE

## 2022-05-10 SDOH — ECONOMIC STABILITY: FOOD INSECURITY: WITHIN THE PAST 12 MONTHS, THE FOOD YOU BOUGHT JUST DIDN'T LAST AND YOU DIDN'T HAVE MONEY TO GET MORE.: NEVER TRUE

## 2022-05-10 ASSESSMENT — PATIENT HEALTH QUESTIONNAIRE - PHQ9
10. IF YOU CHECKED OFF ANY PROBLEMS, HOW DIFFICULT HAVE THESE PROBLEMS MADE IT FOR YOU TO DO YOUR WORK, TAKE CARE OF THINGS AT HOME, OR GET ALONG WITH OTHER PEOPLE: 0
SUM OF ALL RESPONSES TO PHQ QUESTIONS 1-9: 0
9. THOUGHTS THAT YOU WOULD BE BETTER OFF DEAD, OR OF HURTING YOURSELF: 0
SUM OF ALL RESPONSES TO PHQ QUESTIONS 1-9: 0
4. FEELING TIRED OR HAVING LITTLE ENERGY: 0
7. TROUBLE CONCENTRATING ON THINGS, SUCH AS READING THE NEWSPAPER OR WATCHING TELEVISION: 0
6. FEELING BAD ABOUT YOURSELF - OR THAT YOU ARE A FAILURE OR HAVE LET YOURSELF OR YOUR FAMILY DOWN: 0
2. FEELING DOWN, DEPRESSED OR HOPELESS: 0
5. POOR APPETITE OR OVEREATING: 0
SUM OF ALL RESPONSES TO PHQ QUESTIONS 1-9: 0
8. MOVING OR SPEAKING SO SLOWLY THAT OTHER PEOPLE COULD HAVE NOTICED. OR THE OPPOSITE, BEING SO FIGETY OR RESTLESS THAT YOU HAVE BEEN MOVING AROUND A LOT MORE THAN USUAL: 0
SUM OF ALL RESPONSES TO PHQ QUESTIONS 1-9: 0
SUM OF ALL RESPONSES TO PHQ9 QUESTIONS 1 & 2: 0
3. TROUBLE FALLING OR STAYING ASLEEP: 0
1. LITTLE INTEREST OR PLEASURE IN DOING THINGS: 0

## 2022-05-10 ASSESSMENT — SOCIAL DETERMINANTS OF HEALTH (SDOH): HOW HARD IS IT FOR YOU TO PAY FOR THE VERY BASICS LIKE FOOD, HOUSING, MEDICAL CARE, AND HEATING?: NOT HARD AT ALL

## 2022-05-10 NOTE — PROGRESS NOTES
Subjective:      Patient ID: Zee London is a 65 y.o. female who presents today for:     Chief Complaint   Patient presents with    Follow-up       HPI  Patient is a very pleasant 31-year-old female presents today to follow-up on chronic conditions. Vertigo: Has been longstanding and chronic responsive to meclizine. Symptoms are intermittent. Patient denies any numbness, tingling or weakness    Hypothyroidism: Stable. Patient in the care of endocrinology with last thyroid function being within normal limits    Paroxysmal atrial fibrillation: Patient is in the care of cardiology.   She denies any chest pain, shortness of breath lower extremity edema or lightheadedness  Past Medical History:   Diagnosis Date    Atrial fibrillation ([facility]     Hypertension     PE (pulmonary thromboembolism) ([facility] 2020    Ventricular tachycardia (HCC)      Past Surgical History:   Procedure Laterality Date    APPENDECTOMY      CARDIOVERSION  2021    CHOLECYSTECTOMY      COLONOSCOPY N/A 2021    COLONOSCOPY DIAGNOSTIC WITH POLYPECTOMY performed by Demarcus Pretty [facility] at [facility]      THYROIDECTOMY      TONSILLECTOMY AND ADENOIDECTOMY      TUBAL LIGATION       Family History   Problem Relation Age of Onset    Stroke Mother         CVA    Diabetes Mother     Heart Disease Father     Heart Failure Sister     Colon Cancer Neg Hx      Social History     Socioeconomic History    Marital status:      Spouse name: Not on file    Number of children: Not on file    Years of education: Not on file    Highest education level: Not on file   Occupational History    Not on file   Tobacco Use    Smoking status: Former Smoker     Packs/day: 1.50     Years: 35.00     Pack years: 52.50     Types: Cigarettes     Start date:      Quit date:      Years since quittin.3    Smokeless tobacco: Never Used   Vaping Use    Vaping Use: Never used   Substance and Sexual Activity    Alcohol use: Not Currently    Drug use: Never    Sexual activity: Not on file   Other Topics Concern    Not on file   Social History Narrative    Not on file     Social Determinants of Health     Financial Resource Strain: Low Risk     Difficulty of Paying Living Expenses: Not hard at all   Food Insecurity: No Food Insecurity    Worried About Running Out of Food in the Last Year: Never true    [address] in the Last Year: Never true   Transportation Needs:     Lack of Transportation (Medical): Not on file    Lack of Transportation (Non-Medical):  Not on file   Physical Activity:     Days of Exercise per Week: Not on file    Minutes of Exercise per Session: Not on file   Stress:     Feeling of Stress : Not on file   Social Connections:     Frequency of Communication with Friends and Family: Not on file    Frequency of Social Gatherings with Friends and Family: Not on file    Attends [employer] Services: Not on file    Active Member of [address] [place] [address] or Organizations: Not on file    Attends Club or Organization Meetings: Not on file    Marital Status: Not on file   Intimate Partner Violence:     Fear of Current or Ex-Partner: Not on file    Emotionally Abused: Not on file    Physically Abused: Not on file    Sexually Abused: Not on file   Housing Stability:     Unable to Pay for Housing in the Last Year: Not on file    Number of [place] in the Last Year: Not on file    Unstable Housing in the Last Year: Not on file     Current Outpatient Medications on File Prior to Visit   Medication Sig Dispense Refill    apixaban (ELIQUIS) 5 MG TABS tablet Take 1 tablet by mouth in the morning and at bedtime 180 tablet 1    hydroCHLOROthiazide (HYDRODIURIL) 12.5 MG tablet Take 1 tablet by mouth daily 90 tablet 3    atorvastatin (LIPITOR) 40 MG tablet Take 1 tablet by mouth daily 90 tablet 3    levothyroxine (SYNTHROID) 200 MCG tablet 1 po daily 90 tablet 03    aspirin 81 MG EC tablet Take 81 mg by mouth daily      pyridoxine (B-6) 100 MG tablet Take 50 mg by mouth daily      Cyanocobalamin (B-12) 1000 MCG SUBL Place under the tongue      folic acid (FOLVITE) 167 MCG tablet Take 800 mcg by mouth daily      losartan (COZAAR) 25 MG tablet TAKE 1 TABLET BY MOUTH EVERY DAY       No current facility-administered medications on file prior to visit. Allergies:  Lisinopril    Review of Systems   Constitutional: Negative for activity change, appetite change and fatigue. Respiratory: Negative for apnea, cough, chest tightness and shortness of breath. Cardiovascular: Negative for chest pain, palpitations and leg swelling. Gastrointestinal: Negative for abdominal pain, blood in stool, constipation, diarrhea, nausea and vomiting. Musculoskeletal: Negative for arthralgias. Neurological: Positive for dizziness. Negative for tremors, seizures, speech difficulty, weakness, light-headedness, numbness and headaches. Psychiatric/Behavioral: Negative for hallucinations and suicidal ideas. Objective:   /86   Pulse 84   Temp 96.6 °F (35.9 °C) (Infrared)   Wt (!) 318 lb 3.2 oz (144.3 kg)   SpO2 98%   BMI 51.36 kg/m²     Physical Exam  Vitals and nursing note reviewed. Constitutional:       General: She is not in acute distress. Appearance: Normal appearance. She is well-developed. She is not diaphoretic. HENT:      Head: Normocephalic and atraumatic. Nose: Nose normal.      Mouth/Throat:      Mouth: Mucous membranes are moist.      Pharynx: Oropharynx is clear. Eyes:      Conjunctiva/sclera: Conjunctivae normal.      Pupils: Pupils are equal, round, and reactive to light. Cardiovascular:      Rate and Rhythm: Normal rate and regular rhythm. Heart sounds: Normal heart sounds. No murmur heard. No friction rub. No gallop. Pulmonary:      Effort: Pulmonary effort is normal. No respiratory distress. Breath sounds: Normal breath sounds. No wheezing or rales. Chest:      Chest wall: No tenderness. Abdominal:      General: Abdomen is flat. Bowel sounds are normal.      Palpations: Abdomen is soft. Tenderness: There is no abdominal tenderness. Musculoskeletal:      Cervical back: Normal range of motion. Skin:     General: Skin is warm and dry. Neurological:      Mental Status: She is alert and oriented to person, place, and time. Psychiatric:         Behavior: Behavior normal.         Thought Content: Thought content normal.         Judgment: Judgment normal.         Assessment & Plan:     1. Vertigo  Chronic: We will refer to reestablish with neurology if symptoms continue  - meclizine (ANTIVERT) 12.5 MG tablet; Take 1 tablet by mouth 3 times daily as needed for Dizziness  Dispense: 30 tablet; Refill: 0    2. ALEYDA (obstructive sleep apnea)  Continue CPAP    3. Stage 3a chronic kidney disease ([facility] [facility]  Follow-up with nephrology    4. PAF (paroxysmal atrial fibrillation) ([facility] [address] [facility]  Follow-up with cardiology    5. Postoperative hypothyroidism  Follow-up with endocrinology. Continue current medication    6. Encounter for hepatitis C screening test for low risk patient  - Hepatitis C Antibody; Future    7. Breast cancer screening by mammogram  - GUERLINE DIGITAL SCREEN W OR WO CAD BILATERAL; Future      Return in about 6 months (around 11/10/2022).     Rivas Barnes MD

## 2022-05-12 DIAGNOSIS — I10 ESSENTIAL HYPERTENSION: Primary | ICD-10-CM

## 2022-05-12 RX ORDER — METOPROLOL SUCCINATE 100 MG/1
150 TABLET, EXTENDED RELEASE ORAL 2 TIMES DAILY
Qty: 270 TABLET | Refills: 2 | Status: SHIPPED | OUTPATIENT
Start: 2022-05-12

## 2022-05-12 NOTE — TELEPHONE ENCOUNTER
Please approve or deny this refill request. The order is pended. Thank you.     LOV 2/23/2022    Next Visit Date:  Future Appointments   Date Time Provider Salvador Elizabeth   5/31/2022  1:00 PM Logan Huang MD  [address]   6/29/2022 12:15 PM Jinny Pretty MD [facility]   11/2/2022 10:00 AM Esme Piedra MD [facility]

## 2022-05-16 DIAGNOSIS — G62.9 PERIPHERAL POLYNEUROPATHY: Primary | ICD-10-CM

## 2022-05-16 DIAGNOSIS — E03.9 HYPOTHYROIDISM, UNSPECIFIED TYPE: ICD-10-CM

## 2022-05-16 RX ORDER — GABAPENTIN 100 MG/1
CAPSULE ORAL
Qty: 30 CAPSULE | Refills: 3 | OUTPATIENT
Start: 2022-05-16

## 2022-05-16 RX ORDER — GABAPENTIN 100 MG/1
100 CAPSULE ORAL DAILY
Qty: 30 CAPSULE | Refills: 2 | Status: SHIPPED | OUTPATIENT
Start: 2022-05-16 | End: 2022-08-05

## 2022-05-16 NOTE — TELEPHONE ENCOUNTER
Patient requesting medication refill. Please approve or deny this request.    Rx requested:  Requested Prescriptions     Pending Prescriptions Disp Refills    gabapentin (NEURONTIN) 100 MG capsule 30 capsule 2     Sig: Take 1 capsule by mouth daily for 90 days.  Intended supply: 90 days         Last Office Visit:   5/4/2022      Next Visit Date:  Future Appointments   Date Time Provider Salvador Johnson   5/31/2022  1:00 PM Dillan Castellanos MD [address]   6/29/2022 12:15 PM Caren Marcos MD [address] [place] [address]   11/2/2022 10:00 AM Han Tavarez MD [facility]

## 2022-05-23 ASSESSMENT — ENCOUNTER SYMPTOMS
APNEA: 0
COUGH: 0
SHORTNESS OF BREATH: 0
VOMITING: 0
ABDOMINAL PAIN: 0
CONSTIPATION: 0
NAUSEA: 0
CHEST TIGHTNESS: 0
BLOOD IN STOOL: 0
DIARRHEA: 0

## 2022-05-31 ENCOUNTER — HOSPITAL ENCOUNTER (OUTPATIENT)
Dept: SLEEP CENTER | Age: 57
Discharge: HOME | End: 2022-06-02
Payer: COMMERCIAL

## 2022-05-31 PROCEDURE — 95811 POLYSOM 6/>YRS CPAP 4/> PARM: CPT

## 2022-05-31 PROCEDURE — 95811 POLYSOM 6/>YRS CPAP 4/> PARM: CPT | Performed by: INTERNAL MEDICINE

## 2022-06-14 ENCOUNTER — OFFICE VISIT (OUTPATIENT)
Dept: PULMONOLOGY | Age: 57
End: 2022-06-14
Payer: COMMERCIAL

## 2022-06-14 VITALS
WEIGHT: 293 LBS | OXYGEN SATURATION: 97 % | SYSTOLIC BLOOD PRESSURE: 130 MMHG | TEMPERATURE: 97 F | BODY MASS INDEX: 47.09 KG/M2 | DIASTOLIC BLOOD PRESSURE: 64 MMHG | HEIGHT: 66 IN | HEART RATE: 69 BPM | RESPIRATION RATE: 16 BRPM

## 2022-06-14 DIAGNOSIS — E66.01 MORBID OBESITY (HCC): ICD-10-CM

## 2022-06-14 DIAGNOSIS — I27.82 CHRONIC SADDLE PULMONARY EMBOLISM WITHOUT ACUTE COR PULMONALE (HCC): ICD-10-CM

## 2022-06-14 DIAGNOSIS — I26.92 CHRONIC SADDLE PULMONARY EMBOLISM WITHOUT ACUTE COR PULMONALE (HCC): ICD-10-CM

## 2022-06-14 DIAGNOSIS — G47.33 OSA (OBSTRUCTIVE SLEEP APNEA): Primary | ICD-10-CM

## 2022-06-14 PROCEDURE — G8427 DOCREV CUR MEDS BY ELIG CLIN: HCPCS | Performed by: INTERNAL MEDICINE

## 2022-06-14 PROCEDURE — 3017F COLORECTAL CA SCREEN DOC REV: CPT | Performed by: INTERNAL MEDICINE

## 2022-06-14 PROCEDURE — 99214 OFFICE O/P EST MOD 30 MIN: CPT | Performed by: INTERNAL MEDICINE

## 2022-06-14 PROCEDURE — G8417 CALC BMI ABV UP PARAM F/U: HCPCS | Performed by: INTERNAL MEDICINE

## 2022-06-14 PROCEDURE — 1036F TOBACCO NON-USER: CPT | Performed by: INTERNAL MEDICINE

## 2022-06-14 RX ORDER — MULTIVITAMIN WITH IRON
TABLET ORAL DAILY
COMMUNITY

## 2022-06-14 ASSESSMENT — ENCOUNTER SYMPTOMS
SHORTNESS OF BREATH: 0
SORE THROAT: 0
NAUSEA: 0
COUGH: 0
EYE ITCHING: 0
ABDOMINAL PAIN: 0
EYE DISCHARGE: 0
CHEST TIGHTNESS: 0
DIARRHEA: 0
VOMITING: 0
RHINORRHEA: 0
WHEEZING: 0
TROUBLE SWALLOWING: 0
VOICE CHANGE: 0
SINUS PRESSURE: 0

## 2022-06-14 NOTE — PROGRESS NOTES
Subjective:     Jigar Barrett is a 65 y.o. female who complains today of:     Chief Complaint   Patient presents with    Follow-up     patient is following up on ALEYDA. HPI  She has HST  done on 3/27/22   an shows ALEYDA. AHI  13.5   CPAP titration study done on 5/31/22and therapeutic CPAP at 10 cm   She is complaining of snoring, no daytime sleepiness and tiredness. She wakes up with gasping for air. C/o wakes up frequently during sleep . She does not have restful sleep. She does not take daily naps. She does not fall asleep while watching TV. She does not have a complaint of sleepiness while driving.     She has Unprovoked B/L DVT and Saddle Embolism on Eliquis  Thyroid cancer s/p Thyroidectomy ,   She also has Afib, hypertension and following with  .     Allergies:  Lisinopril  Past Medical History:   Diagnosis Date    Atrial fibrillation ([address]     Hypertension     PE (pulmonary thromboembolism) ([facility] [address] 11/2020    Ventricular tachycardia (HCC)      Past Surgical History:   Procedure Laterality Date    APPENDECTOMY      CARDIOVERSION  03/16/2021    CHOLECYSTECTOMY      COLONOSCOPY N/A 7/21/2021    COLONOSCOPY DIAGNOSTIC WITH POLYPECTOMY performed by [facility]      THYROIDECTOMY      TONSILLECTOMY AND ADENOIDECTOMY      TUBAL LIGATION       Family History   Problem Relation Age of Onset    Stroke Mother         CVA    Diabetes Mother     Heart Disease Father     Heart Failure Sister     Colon Cancer Neg Hx      Social History     Socioeconomic History    Marital status:      Spouse name: Not on file    Number of children: Not on file    Years of education: Not on file    Highest education level: Not on file   Occupational History    Not on file   Tobacco Use    Smoking status: Former Smoker     Packs/day: 1.50     Years: 35.00     Pack years: 52.50     Types: Cigarettes     Start date: 1984     Quit date: 2014 Years since quittin.4    Smokeless tobacco: Never Used   Vaping Use    Vaping Use: Never used   Substance and Sexual Activity    Alcohol use: Not Currently    Drug use: Never    Sexual activity: Not on file   Other Topics Concern    Not on file   Social History Narrative    Not on file     Social Determinants of Health     Financial Resource Strain: Low Risk     Difficulty of Paying Living Expenses: Not hard at all   Food Insecurity: No Food Insecurity    Worried About [address] in the Last Year: Never true    [address] in the Last Year: Never true   Transportation Needs:     Lack of Transportation (Medical): Not on file    Lack of Transportation (Non-Medical): Not on file   Physical Activity:     Days of Exercise per Week: Not on file    Minutes of Exercise per Session: Not on file   Stress:     Feeling of Stress : Not on file   Social Connections:     Frequency of Communication with Friends and Family: Not on file    Frequency of Social Gatherings with Friends and Family: Not on file    Attends [employer] Services: Not on file    Active Member of [address] [place] [address] or Organizations: Not on file    Attends Club or Organization Meetings: Not on file    Marital Status: Not on file   Intimate Partner Violence:     Fear of Current or Ex-Partner: Not on file    Emotionally Abused: Not on file    Physically Abused: Not on file    Sexually Abused: Not on file   Housing Stability:     Unable to Pay for Housing in the Last Year: Not on file    Number of [place] in the Last Year: Not on file    Unstable Housing in the Last Year: Not on file         Review of Systems   Constitutional: Negative for chills, diaphoresis, fatigue and fever. HENT: Negative for congestion, mouth sores, nosebleeds, postnasal drip, rhinorrhea, sinus pressure, sneezing, sore throat, trouble swallowing and voice change. Snore   Eyes: Negative for discharge, itching and visual disturbance. Respiratory: Negative for cough, chest tightness, shortness of breath and wheezing. Cardiovascular: Negative for chest pain, palpitations and leg swelling. Gastrointestinal: Negative for abdominal pain, diarrhea, nausea and vomiting. Genitourinary: Negative for difficulty urinating and hematuria. Musculoskeletal: Negative for arthralgias, joint swelling and myalgias. Skin: Negative for rash. Allergic/Immunologic: Negative for environmental allergies and food allergies. Neurological: Negative for dizziness, tremors, weakness and headaches. Psychiatric/Behavioral: Positive for sleep disturbance. Negative for behavioral problems. :     Vitals:    06/14/22 1349   BP: 130/64   Site: Right Upper Arm   Position: Sitting   Cuff Size: Large Adult   Pulse: 69   Resp: 16   Temp: 97 °F (36.1 °C)   TempSrc: Temporal   SpO2: 97%   Weight: (!) 324 lb (147 kg)   Height: 5' 6\" (1.676 m)     Wt Readings from Last 3 Encounters:   06/14/22 (!) 324 lb (147 kg)   05/10/22 (!) 318 lb 3.2 oz (144.3 kg)   05/04/22 (!) 318 lb (144.2 kg)         Physical Exam  Constitutional:       General: She is not in acute distress. Appearance: She is well-developed. She is obese. She is not diaphoretic. HENT:      Head: Normocephalic and atraumatic. Nose: Nose normal.   Eyes:      Pupils: Pupils are equal, round, and reactive to light. Neck:      Thyroid: No thyromegaly. Vascular: No JVD. Trachea: No tracheal deviation. Cardiovascular:      Rate and Rhythm: Normal rate. Rhythm irregular. Heart sounds: No murmur heard. No friction rub. No gallop. Pulmonary:      Effort: No respiratory distress. Breath sounds: No wheezing or rales. Chest:      Chest wall: No tenderness. Abdominal:      General: There is no distension. Tenderness: There is no abdominal tenderness. There is no rebound. Musculoskeletal:         General: Normal range of motion.    Lymphadenopathy:      Cervical: No cervical adenopathy. Skin:     General: Skin is warm and dry. Neurological:      Mental Status: She is alert and oriented to person, place, and time. Coordination: Coordination normal.         Current Outpatient Medications   Medication Sig Dispense Refill    vitamin B-6 (PYRIDOXINE) 100 MG tablet daily      CPAP Machine MISC by Does not apply route New CPAP with 10 cm of H2O 1 each 0    gabapentin (NEURONTIN) 100 MG capsule Take 1 capsule by mouth daily for 90 days. Intended supply: 90 days 30 capsule 2    metoprolol succinate (TOPROL XL) 100 MG extended release tablet Take 1.5 tablets by mouth 2 times daily 270 tablet 2    losartan (COZAAR) 25 MG tablet TAKE 1 TABLET BY MOUTH EVERY DAY      apixaban (ELIQUIS) 5 MG TABS tablet Take 1 tablet by mouth in the morning and at bedtime 180 tablet 1    hydroCHLOROthiazide (HYDRODIURIL) 12.5 MG tablet Take 1 tablet by mouth daily 90 tablet 3    atorvastatin (LIPITOR) 40 MG tablet Take 1 tablet by mouth daily 90 tablet 3    levothyroxine (SYNTHROID) 200 MCG tablet 1 po daily 90 tablet 03    aspirin 81 MG EC tablet Take 81 mg by mouth daily      Cyanocobalamin (B-12) 1000 MCG SUBL Place under the tongue      folic acid (FOLVITE) 609 MCG tablet Take 800 mcg by mouth daily       No current facility-administered medications for this visit. Assessment/Plan:     1. ALEYDA (obstructive sleep apnea)  She has HST  done on 3/27/22   an shows ALEYDA. AHI  13.5 CPAP titration study done on 5/31/22and therapeutic CPAP at 10 cm She is complaining of snoring, no daytime sleepiness and tiredness. She wakes up with gasping for air. C/o wakes up frequently during sleep . She does not have restful sleep. She will be  Started  On CPAP with 10 cm    -New CPAP with 10 cm     Counseling: CPAP/BiPAP uses, She advised to use CPAP at least 5-6 hours every night.     Driving: She is advised for extreme caution when driving or operating machinery if there is a feeling of drowsiness, especially while driving it is preferable to stop driving and take a brief nap. Sleep hygiene:Avoid supine sleep, sleep on  sides. Avoid  sleep deprivation. Explained sleep hygiene. Advice to avoid Alcohol and sedative    2. Morbid obesity ([address]  She is advised try to lose weight. obesity related risk explained to the patient ,  Current weight:  (!) 324 lb (147 kg) Lbs. BMI:  Body mass index is 52.29 kg/m². Suggested weight control approaches, including dietary changes , exercise, behavioral modification. 3. 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       Flaquito Chapman MD

## 2022-06-29 ENCOUNTER — OFFICE VISIT (OUTPATIENT)
Dept: CARDIOLOGY CLINIC | Age: 57
End: 2022-06-29
Payer: COMMERCIAL

## 2022-06-29 VITALS
SYSTOLIC BLOOD PRESSURE: 124 MMHG | HEART RATE: 91 BPM | RESPIRATION RATE: 17 BRPM | OXYGEN SATURATION: 98 % | WEIGHT: 293 LBS | HEIGHT: 66 IN | BODY MASS INDEX: 47.09 KG/M2 | DIASTOLIC BLOOD PRESSURE: 80 MMHG

## 2022-06-29 DIAGNOSIS — I26.02 SADDLE EMBOLUS OF PULMONARY ARTERY WITH ACUTE COR PULMONALE, UNSPECIFIED CHRONICITY (HCC): ICD-10-CM

## 2022-06-29 DIAGNOSIS — I82.533 CHRONIC DEEP VEIN THROMBOSIS (DVT) OF POPLITEAL VEIN OF BOTH LOWER EXTREMITIES (HCC): ICD-10-CM

## 2022-06-29 DIAGNOSIS — I48.91 ATRIAL FIBRILLATION, UNSPECIFIED TYPE (HCC): ICD-10-CM

## 2022-06-29 DIAGNOSIS — E78.5 DYSLIPIDEMIA: ICD-10-CM

## 2022-06-29 DIAGNOSIS — I48.0 PAF (PAROXYSMAL ATRIAL FIBRILLATION) (HCC): Primary | ICD-10-CM

## 2022-06-29 DIAGNOSIS — I10 ESSENTIAL HYPERTENSION: ICD-10-CM

## 2022-06-29 DIAGNOSIS — R06.09 DOE (DYSPNEA ON EXERTION): ICD-10-CM

## 2022-06-29 DIAGNOSIS — I25.10 CORONARY ARTERY DISEASE INVOLVING NATIVE CORONARY ARTERY OF NATIVE HEART WITHOUT ANGINA PECTORIS: ICD-10-CM

## 2022-06-29 PROCEDURE — G8427 DOCREV CUR MEDS BY ELIG CLIN: HCPCS | Performed by: INTERNAL MEDICINE

## 2022-06-29 PROCEDURE — G8417 CALC BMI ABV UP PARAM F/U: HCPCS | Performed by: INTERNAL MEDICINE

## 2022-06-29 PROCEDURE — 99214 OFFICE O/P EST MOD 30 MIN: CPT | Performed by: INTERNAL MEDICINE

## 2022-06-29 PROCEDURE — 3017F COLORECTAL CA SCREEN DOC REV: CPT | Performed by: INTERNAL MEDICINE

## 2022-06-29 PROCEDURE — 1036F TOBACCO NON-USER: CPT | Performed by: INTERNAL MEDICINE

## 2022-06-29 RX ORDER — HYDROCHLOROTHIAZIDE 12.5 MG/1
12.5 TABLET ORAL DAILY
Qty: 90 TABLET | Refills: 3 | Status: SHIPPED | OUTPATIENT
Start: 2022-06-29 | End: 2022-06-29 | Stop reason: SDUPTHER

## 2022-06-29 RX ORDER — HYDROCHLOROTHIAZIDE 12.5 MG/1
12.5 TABLET ORAL DAILY
Qty: 90 TABLET | Refills: 3 | Status: SHIPPED | OUTPATIENT
Start: 2022-06-29

## 2022-06-29 ASSESSMENT — ENCOUNTER SYMPTOMS
BLOOD IN STOOL: 0
GASTROINTESTINAL NEGATIVE: 1
NAUSEA: 0
COUGH: 0
WHEEZING: 0
EYES NEGATIVE: 1
SHORTNESS OF BREATH: 1
STRIDOR: 0
CHEST TIGHTNESS: 0

## 2022-06-29 NOTE — PROGRESS NOTES
OFFICE VISIT         Patient: Aileen Paul  YOB: 1965  MRN: [id number]    Chief Complaint: DVT PE AF REBOLLEDO   Chief Complaint   Patient presents with    Follow-up     4 month for PAF        CV Data:  11/2020 Unprovoked B/L DVT and Saddle Embolism   Thyroid cancer s/p Thyroidectomy   4/21 SPECT abn anterior   5/12/21 Cath LAD - Aneurysmal dilation with moderate sequential lesions 50-60 and  IFR negative. EF 60       Subjective/HPI pt is SOB with any ADLs. No cp currently. Compliant with all meds. No bleed. No falls. haad AF since PE. Was seeing Dr. Keely Carl but changing. There were plans for CVN and Antiarrhythmic    3/23/21 still winded with ADLs. No pain. Went back into AF. No cp tired. 5/3/21 still REBOLLEDO no cp no bleed. No falls takes meds    6/23/21 still Rebol[place] no cp takes meds. No falls no bleed. Having pain with heel spurrs. 10/20/21 DOING WELL NO CP NO SOB No falls no bleed takes meds. Discomfort from heel spurs. 2/23/22 doing well no cp no sob she can not sense AF. No bleed. Takes meds.      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  Former smoker  No ETOH  Lives with  and kids  ++FH     EKG: AF 84 QTc 406 ms    Past Medical History:   Diagnosis Date    Atrial fibrillation ([address]     Hypertension     PE (pulmonary thromboembolism) ([address] 11/2020    Ventricular tachycardia ([address]        Past Surgical History:   Procedure Laterality Date    APPENDECTOMY      CARDIOVERSION  03/16/2021    CHOLECYSTECTOMY      COLONOSCOPY N/A 7/21/2021    COLONOSCOPY DIAGNOSTIC WITH POLYPECTOMY performed by Yong Husain MD at [address] THYROIDECTOMY      TONSILLECTOMY AND ADENOIDECTOMY      TUBAL LIGATION         Family History   Problem Relation Age of Onset    Stroke Mother         CVA    Diabetes Mother     Heart Disease Father     Heart Failure Sister     Colon Cancer Neg Hx        Social History     Socioeconomic History    Marital status:      Spouse name: Not on file    Number of children: Not on file    Years of education: Not on file    Highest education level: Not on file   Occupational History    Not on file   Tobacco Use    Smoking status: Former Smoker     Packs/day: 1.50     Years: 35.00     Pack years: 52.50     Types: Cigarettes     Start date: 47     Quit date:      Years since quittin.4    Smokeless tobacco: Never Used   Vaping Use    Vaping Use: Never used   Substance and Sexual Activity    Alcohol use: Not Currently    Drug use: Never    Sexual activity: Not on file   Other Topics Concern    Not on file   Social History Narrative    Not on file     Social Determinants of Health     Financial Resource Strain: Low Risk     Difficulty of Paying Living Expenses: Not hard at all   Food Insecurity: No Food Insecurity    Worried About [employer] [employer] in the Last Year: Never true    [facility] [facility] [employer] in the Last Year: Never true   Transportation Needs:     Lack of Transportation (Medical): Not on file    Lack of Transportation (Non-Medical):  Not on file   Physical Activity:     Days of Exercise per Week: Not on file    Minutes of Exercise per Session: Not on file   Stress:     Feeling of Stress : Not on file   Social Connections:     Frequency of Communication with Friends and Family: Not on file    Frequency of Social Gatherings with Friends and Family: Not on file    Attends Temple Services: Not on file    Active Member of Clubs or Organizations: Not on file    Attends Club or Organization Meetings: Not on file    Marital Status: Not on file   Intimate Partner Violence:     Fear of Current or Ex-Partner: Not on file    Emotionally Abused: Not on file    Physically Abused: Not on file    Sexually Abused: Not on file   Housing Stability:     Unable to Pay for Housing in the Last Year: Not on file    Number of Places Lived in the Last Year: Not on file    Unstable Housing in the Last Year: Not on file       Allergies   Allergen Reactions    Lisinopril Other (See Comments)     COUGHING       Current Outpatient Medications   Medication Sig Dispense Refill    apixaban (ELIQUIS) 5 MG TABS tablet Take 1 tablet by mouth in the morning and at bedtime 180 tablet 1    hydroCHLOROthiazide (HYDRODIURIL) 12.5 MG tablet Take 1 tablet by mouth daily 90 tablet 3    vitamin B-6 (PYRIDOXINE) 100 MG tablet daily      CPAP Machine MISC by Does not apply route New CPAP with 10 cm of H2O 1 each 0    gabapentin (NEURONTIN) 100 MG capsule Take 1 capsule by mouth daily for 90 days. Intended supply: 90 days 30 capsule 2    metoprolol succinate (TOPROL XL) 100 MG extended release tablet Take 1.5 tablets by mouth 2 times daily 270 tablet 2    losartan (COZAAR) 25 MG tablet TAKE 1 TABLET BY MOUTH EVERY DAY      atorvastatin (LIPITOR) 40 MG tablet Take 1 tablet by mouth daily 90 tablet 3    levothyroxine (SYNTHROID) 200 MCG tablet 1 po daily 90 tablet 03    aspirin 81 MG EC tablet Take 81 mg by mouth daily      Cyanocobalamin (B-12) 1000 MCG SUBL Place under the tongue      folic acid (FOLVITE) 963 MCG tablet Take 800 mcg by mouth daily       No current facility-administered medications for this visit. Review of Systems:   Review of Systems   Constitutional: Negative. Negative for diaphoresis and fatigue. HENT: Negative. Eyes: Negative. Respiratory: Positive for shortness of breath. Negative for cough, chest tightness, wheezing and stridor. Cardiovascular: Negative. Negative for chest pain, palpitations and leg swelling. Gastrointestinal: Negative. Negative for blood in stool and nausea. Genitourinary: Negative. Musculoskeletal: Negative. Skin: Negative. Neurological: Negative. Negative for dizziness, syncope, weakness and light-headedness. Hematological: Negative. Psychiatric/Behavioral: Negative.           Physical Examination:    /80 (Site: Left Lower Arm, Position: Sitting, Cuff Size: Small Adult)   Pulse 91   Resp 17   Ht 5' 6\" (1.676 m)   Wt (!) 316 lb (143.3 kg)   SpO2 98%   BMI 51.00 kg/m²    Physical Exam   Constitutional: She appears healthy. No distress. HENT:   Normal cephalic and Atraumatic   Eyes: Pupils are equal, round, and reactive to light. Neck: Thyroid normal. No JVD present. No neck adenopathy. No thyromegaly present. Cardiovascular: Normal rate, intact distal pulses and normal pulses. An irregularly irregular rhythm present. Murmur heard. Pulmonary/Chest: Effort normal and breath sounds normal. She has no wheezes. She has no rales. She exhibits no tenderness. Abdominal: Soft. Bowel sounds are normal. There is no abdominal tenderness. Musculoskeletal:         General: No tenderness or edema. Normal range of motion. Cervical back: Normal range of motion and neck supple. Neurological: She is alert and oriented to person, place, and time. Skin: Skin is warm. No cyanosis. Nails show no clubbing.        LABS:  CBC:   Lab Results   Component Value Date    WBC 7.3 02/23/2022    RBC 5.16 02/23/2022    HGB 16.3 02/23/2022    HCT 48.9 02/23/2022    MCV 94.7 02/23/2022    MCH 31.6 02/23/2022    MCHC 33.4 02/23/2022    RDW 14.7 02/23/2022     02/23/2022     Lipids:  Lab Results   Component Value Date    CHOL 165 09/30/2020     Lab Results   Component Value Date    TRIG 192 09/30/2020     Lab Results   Component Value Date    HDL 42 10/27/2021    HDL 40 09/30/2020     Lab Results   Component Value Date    LDLCALC 36 10/27/2021    LDLCALC 87 09/30/2020     Lab Results   Component Value Date    VLDL 38 09/30/2020     No results found for: CHOLHDLRATIO  CMP:    Lab Results   Component Value Date     03/09/2022    K 4.6 03/09/2022     03/09/2022    CO2 28 03/09/2022    BUN 20 03/09/2022    CREATININE 1.19 03/09/2022    GFRAA 56.8 03/09/2022    LABGLOM 46.9 03/09/2022    GLUCOSE 110 03/09/2022    PROT 7.0 10/27/2021    LABALBU 3.7 03/09/2022    CALCIUM 9.2 03/09/2022    BILITOT 0.9 10/27/2021    ALKPHOS 129 10/27/2021    AST 29 10/27/2021    ALT 31 10/27/2021     BMP:    Lab Results   Component Value Date     03/09/2022    K 4.6 03/09/2022     03/09/2022    CO2 28 03/09/2022    BUN 20 03/09/2022    LABALBU 3.7 03/09/2022    CREATININE 1.19 03/09/2022    CALCIUM 9.2 03/09/2022    GFRAA 56.8 03/09/2022    LABGLOM 46.9 03/09/2022    GLUCOSE 110 03/09/2022     Magnesium:    Lab Results   Component Value Date    MG 2.1 03/10/2021     TSH:  Lab Results   Component Value Date    TSH 0.607 06/29/2021             Patient Active Problem List   Diagnosis    Postoperative hypothyroidism    Papillary thyroid carcinoma (HCC)    PAF (paroxysmal atrial fibrillation) (HCC)    Abnormal stress test    Polyp of colon    ALEYDA (obstructive sleep apnea)       Medications Discontinued During This Encounter   Medication Reason    hydroCHLOROthiazide (HYDRODIURIL) 12.5 MG tablet REORDER    apixaban (ELIQUIS) 5 MG TABS tablet REORDER       Modified Medications    Modified Medication Previous Medication    APIXABAN (ELIQUIS) 5 MG TABS TABLET apixaban (ELIQUIS) 5 MG TABS tablet       Take 1 tablet by mouth in the morning and at bedtime    Take 1 tablet by mouth in the morning and at bedtime    HYDROCHLOROTHIAZIDE (HYDRODIURIL) 12.5 MG TABLET hydroCHLOROthiazide (HYDRODIURIL) 12.5 MG tablet       Take 1 tablet by mouth daily    Take 1 tablet by mouth daily       Orders Placed This Encounter   Medications    apixaban (ELIQUIS) 5 MG TABS tablet     Sig: Take 1 tablet by mouth in the morning and at bedtime     Dispense:  180 tablet     Refill:  1    hydroCHLOROthiazide (HYDRODIURIL) 12.5 MG tablet     Sig: Take 1 tablet by mouth daily     Dispense:  90 tablet     Refill:  3       Assessment/Plan:    1. Essential hypertension   stable - continue meds. Low salt diet     2. Atrial fibrillation, unspecified type ([address]   NOAC.  - rate control     3. 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 Chronic deep vein thrombosis (DVT) of popliteal vein of both lower extremities (HCC)      5. TONG (dyspnea on exertion) -  Stable     6. CAD - mod LAD. IFR negative. Add Statin. - continue cv meds. Need to loose weight. 7.  ALEYDA - new Dz- will need CPAP. Counseling:  Heart Healthy Lifestyle, Improve BMI, Low Salt Diet, Take Precautions to Prevent Falls and Walk Daily    Return in about 4 months (around 10/29/2022).     Electronically signed by Shaggy Camacho MD on 6/29/2022 at 12:34 PM

## 2022-07-06 ENCOUNTER — TELEPHONE (OUTPATIENT)
Dept: FAMILY MEDICINE CLINIC | Age: 57
End: 2022-07-06

## 2022-08-04 DIAGNOSIS — G62.9 PERIPHERAL POLYNEUROPATHY: ICD-10-CM

## 2022-08-12 RX ORDER — GABAPENTIN 100 MG/1
CAPSULE ORAL
Qty: 30 CAPSULE | Refills: 3 | Status: SHIPPED | OUTPATIENT
Start: 2022-08-12 | End: 2022-09-11

## 2022-10-31 ENCOUNTER — OFFICE VISIT (OUTPATIENT)
Dept: CARDIOLOGY CLINIC | Age: 57
End: 2022-10-31
Payer: COMMERCIAL

## 2022-10-31 VITALS
HEART RATE: 99 BPM | SYSTOLIC BLOOD PRESSURE: 128 MMHG | WEIGHT: 293 LBS | OXYGEN SATURATION: 98 % | DIASTOLIC BLOOD PRESSURE: 82 MMHG | BODY MASS INDEX: 49.16 KG/M2

## 2022-10-31 DIAGNOSIS — E78.5 DYSLIPIDEMIA: ICD-10-CM

## 2022-10-31 DIAGNOSIS — I82.533 CHRONIC DEEP VEIN THROMBOSIS (DVT) OF POPLITEAL VEIN OF BOTH LOWER EXTREMITIES (HCC): ICD-10-CM

## 2022-10-31 DIAGNOSIS — R06.09 DOE (DYSPNEA ON EXERTION): ICD-10-CM

## 2022-10-31 DIAGNOSIS — I48.0 PAF (PAROXYSMAL ATRIAL FIBRILLATION) (HCC): Primary | ICD-10-CM

## 2022-10-31 DIAGNOSIS — I25.10 CORONARY ARTERY DISEASE INVOLVING NATIVE CORONARY ARTERY OF NATIVE HEART WITHOUT ANGINA PECTORIS: ICD-10-CM

## 2022-10-31 DIAGNOSIS — I10 ESSENTIAL HYPERTENSION: ICD-10-CM

## 2022-10-31 DIAGNOSIS — I26.02 SADDLE EMBOLUS OF PULMONARY ARTERY WITH ACUTE COR PULMONALE, UNSPECIFIED CHRONICITY (HCC): ICD-10-CM

## 2022-10-31 DIAGNOSIS — I48.91 ATRIAL FIBRILLATION, UNSPECIFIED TYPE (HCC): ICD-10-CM

## 2022-10-31 PROCEDURE — G8484 FLU IMMUNIZE NO ADMIN: HCPCS | Performed by: INTERNAL MEDICINE

## 2022-10-31 PROCEDURE — G8427 DOCREV CUR MEDS BY ELIG CLIN: HCPCS | Performed by: INTERNAL MEDICINE

## 2022-10-31 PROCEDURE — 3017F COLORECTAL CA SCREEN DOC REV: CPT | Performed by: INTERNAL MEDICINE

## 2022-10-31 PROCEDURE — 1036F TOBACCO NON-USER: CPT | Performed by: INTERNAL MEDICINE

## 2022-10-31 PROCEDURE — 99214 OFFICE O/P EST MOD 30 MIN: CPT | Performed by: INTERNAL MEDICINE

## 2022-10-31 PROCEDURE — 3074F SYST BP LT 130 MM HG: CPT | Performed by: INTERNAL MEDICINE

## 2022-10-31 PROCEDURE — G8417 CALC BMI ABV UP PARAM F/U: HCPCS | Performed by: INTERNAL MEDICINE

## 2022-10-31 PROCEDURE — 3078F DIAST BP <80 MM HG: CPT | Performed by: INTERNAL MEDICINE

## 2022-10-31 ASSESSMENT — ENCOUNTER SYMPTOMS
STRIDOR: 0
GASTROINTESTINAL NEGATIVE: 1
COUGH: 0
CHEST TIGHTNESS: 0
BLOOD IN STOOL: 0
WHEEZING: 0
NAUSEA: 0
SHORTNESS OF BREATH: 1
EYES NEGATIVE: 1

## 2022-10-31 NOTE — PROGRESS NOTES
OFFICE VISIT         Patient: Kristie Senior  YOB: 1965  MRN: [id number]    Chief Complaint: DVT PE AF REBOLLEDO   Chief Complaint   Patient presents with    Follow-up     4 month    Atrial Fibrillation       CV Data:  11/2020 Unprovoked B/L DVT and Saddle Embolism   Thyroid cancer s/p Thyroidectomy   4/21 SPECT abn anterior   5/12/21 Cath LAD - Aneurysmal dilation with moderate sequential lesions 50-60 and  IFR negative. EF 60       Subjective/HPI pt is SOB with any ADLs. No cp currently. Compliant with all meds. No bleed. No falls. haad AF since PE. Was seeing Dr. Kayy Car but changing. There were plans for CVN and Antiarrhythmic    3/23/21 still winded with ADLs. No pain. Went back into AF. No cp tired. 5/3/21 still REBOLLEDO no cp no bleed. No falls takes meds    6/23/21 still [place] no cp takes meds. No falls no bleed. Having pain with heel spurrs. 10/20/21 DOING WELL NO CP NO SOB No falls no bleed takes meds. Discomfort from heel spurs. 2/23/22 doing well no cp no sob she can not sense AF. No bleed. Takes meds. 6/29/22 still REBOLLEDO no cp gaining weight no cp \    10/31/22 doing well no cp no sob no palps now nop fa;lls nmo bleed. Take smeds.      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  Former smoker  No ETOH  Lives with  and kids  ++FH     EKG: AF 80 QTc 406 ms    Past Medical History:   Diagnosis Date    Atrial fibrillation ([address]     Hypertension     PE (pulmonary thromboembolism) ([address] 11/2020    [facility]        Past Surgical History:   Procedure Laterality Date    APPENDECTOMY      CARDIOVERSION  03/16/2021    CHOLECYSTECTOMY      COLONOSCOPY N/A 7/21/2021    COLONOSCOPY DIAGNOSTIC WITH POLYPECTOMY performed by Attila Avila MD at [facility] AND ADENOIDECTOMY      TUBAL LIGATION         Family History   Problem Relation Age of Onset    Stroke Mother         CVA    Diabetes Mother     Heart Disease Father Heart Failure Sister     Colon Cancer Neg Hx        Social History     Socioeconomic History    Marital status:    Tobacco Use    Smoking status: Former     Packs/day: 1.50     Years: 35.00     Pack years: 52.50     Types: Cigarettes     Start date:      Quit date:      Years since quittin.8    Smokeless tobacco: Never   Vaping Use    Vaping Use: Never used   Substance and Sexual Activity    Alcohol use: Not Currently    Drug use: Never     Social Determinants of Health     Financial Resource Strain: Low Risk     Difficulty of Paying Living Expenses: Not hard at all   Food Insecurity: No Food Insecurity    Worried About Running Out of Food in the Last Year: Never true    Ran Out of Food in the Last Year: Never true       Allergies   Allergen Reactions    Lisinopril Other (See Comments)     COUGHING       Current Outpatient Medications   Medication Sig Dispense Refill    apixaban (ELIQUIS) 5 MG TABS tablet Take 1 tablet by mouth in the morning and at bedtime 180 tablet 3    hydroCHLOROthiazide (HYDRODIURIL) 12.5 MG tablet Take 1 tablet by mouth daily 90 tablet 3    vitamin B-6 (PYRIDOXINE) 100 MG tablet daily      CPAP Machine MISC by Does not apply route New CPAP with 10 cm of H2O 1 each 0    metoprolol succinate (TOPROL XL) 100 MG extended release tablet Take 1.5 tablets by mouth 2 times daily 270 tablet 2    losartan (COZAAR) 25 MG tablet TAKE 1 TABLET BY MOUTH EVERY DAY      atorvastatin (LIPITOR) 40 MG tablet Take 1 tablet by mouth daily 90 tablet 3    levothyroxine (SYNTHROID) 200 MCG tablet 1 po daily 90 tablet 03    aspirin 81 MG EC tablet Take 81 mg by mouth daily      Cyanocobalamin (B-12) 1000 MCG SUBL Place under the tongue      folic acid (FOLVITE) 131 MCG tablet Take 800 mcg by mouth daily      gabapentin (NEURONTIN) 100 MG capsule TAKE 1 CAPSULE BY MOUTH EVERY DAY 30 capsule 3     No current facility-administered medications for this visit.        Review of Systems:   Review of Systems   Constitutional: Negative. Negative for diaphoresis and fatigue. HENT: Negative. Eyes: Negative. Respiratory:  Positive for shortness of breath. Negative for cough, chest tightness, wheezing and stridor. Cardiovascular: Negative. Negative for chest pain, palpitations and leg swelling. Gastrointestinal: Negative. Negative for blood in stool and nausea. Genitourinary: Negative. Musculoskeletal: Negative. Skin: Negative. Neurological: Negative. Negative for dizziness, syncope, weakness and light-headedness. Hematological: Negative. Psychiatric/Behavioral: Negative. Physical Examination:    /82 (Site: Right Upper Arm, Position: Sitting, Cuff Size: Large Adult)   Pulse 99   Wt (!) 304 lb 9.6 oz (138.2 kg)   SpO2 98%   BMI 49.16 kg/m²    Physical Exam   Constitutional: She appears healthy. No distress. HENT:   Normal cephalic and Atraumatic   Eyes: Pupils are equal, round, and reactive to light. Neck: Thyroid normal. No JVD present. No neck adenopathy. No thyromegaly present. Cardiovascular: Normal rate, intact distal pulses and normal pulses. An irregularly irregular rhythm present. Murmur heard. Pulmonary/Chest: Effort normal and breath sounds normal. She has no wheezes. She has no rales. She exhibits no tenderness. Abdominal: Soft. Bowel sounds are normal. There is no abdominal tenderness. Musculoskeletal:         General: No tenderness or edema. Normal range of motion. Cervical back: Normal range of motion and neck supple. Neurological: She is alert and oriented to person, place, and time. Skin: Skin is warm. No cyanosis. Nails show no clubbing.      LABS:  CBC:   Lab Results   Component Value Date/Time    WBC 7.6 08/31/2022 03:56 PM    RBC 5.07 08/31/2022 03:56 PM    HGB 16.3 08/31/2022 03:56 PM    HCT 48.6 08/31/2022 03:56 PM    MCV 95.8 08/31/2022 03:56 PM    MCH 32.1 08/31/2022 03:56 PM    MCHC 33.5 08/31/2022 03:56 PM    RDW 13.9 08/31/2022 03:56 PM     08/31/2022 03:56 PM     Lipids:  Lab Results   Component Value Date    CHOL 165 09/30/2020     Lab Results   Component Value Date    TRIG 192 09/30/2020     Lab Results   Component Value Date    HDL 42 10/27/2021    HDL 40 09/30/2020     Lab Results   Component Value Date    LDLCALC 36 10/27/2021    LDLCALC 87 09/30/2020     Lab Results   Component Value Date    VLDL 38 09/30/2020     No results found for: VASQUEZ  CMP:    Lab Results   Component Value Date/Time     08/31/2022 03:59 PM    K 4.2 08/31/2022 03:59 PM    CL 98 08/31/2022 03:59 PM    CO2 29 08/31/2022 03:59 PM    BUN 24 08/31/2022 03:59 PM    CREATININE 1.16 08/31/2022 03:59 PM    GFRAA 58.4 08/31/2022 03:59 PM    LABGLOM 48.2 08/31/2022 03:59 PM    GLUCOSE 94 08/31/2022 03:59 PM    PROT 7.0 10/27/2021 10:57 AM    LABALBU 3.9 08/31/2022 03:59 PM    CALCIUM 9.8 08/31/2022 03:59 PM    BILITOT 0.9 10/27/2021 10:57 AM    ALKPHOS 129 10/27/2021 10:57 AM    AST 29 10/27/2021 10:57 AM    ALT 31 10/27/2021 10:57 AM     BMP:    Lab Results   Component Value Date/Time     08/31/2022 03:59 PM    K 4.2 08/31/2022 03:59 PM    CL 98 08/31/2022 03:59 PM    CO2 29 08/31/2022 03:59 PM    BUN 24 08/31/2022 03:59 PM    LABALBU 3.9 08/31/2022 03:59 PM    CREATININE 1.16 08/31/2022 03:59 PM    CALCIUM 9.8 08/31/2022 03:59 PM    GFRAA 58.4 08/31/2022 03:59 PM    LABGLOM 48.2 08/31/2022 03:59 PM    GLUCOSE 94 08/31/2022 03:59 PM     Magnesium:    Lab Results   Component Value Date/Time    MG 2.1 03/10/2021 09:47 AM     TSH:  Lab Results   Component Value Date    TSH 0.246 (L) 08/31/2022             Patient Active Problem List   Diagnosis    Postoperative hypothyroidism    Papillary thyroid carcinoma (HCC)    PAF (paroxysmal atrial fibrillation) (HCC)    Abnormal stress test    Polyp of colon    ALEYDA (obstructive sleep apnea)       There are no discontinued medications.       Modified Medications    No medications on file       No orders of the defined types were placed in this encounter. Assessment/Plan:    1. Essential hypertension   stable - continue meds. Low salt diet     2. Atrial fibrillation, unspecified type ([address]   NOAC. - rate control     3. 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 Chronic deep vein thrombosis (DVT) of popliteal vein of both lower extremities (HCC)      5. TONG (dyspnea on exertion) -  Stable     6. CAD - mod LAD. IFR negative. Add Statin. - continue cv meds. Need to loose weight. 7.  ALEYDA - continue CPAP           Counseling:  Heart Healthy Lifestyle, Improve BMI, Low Salt Diet, Take Precautions to Prevent Falls and Walk Daily    Return in about 4 months (around 2/28/2023).     Electronically signed by Gilberto Peralta MD on 10/31/2022 at 4:19 PM

## 2022-11-01 ENCOUNTER — OFFICE VISIT (OUTPATIENT)
Dept: PULMONOLOGY | Age: 57
End: 2022-11-01
Payer: COMMERCIAL

## 2022-11-01 VITALS
DIASTOLIC BLOOD PRESSURE: 86 MMHG | OXYGEN SATURATION: 95 % | SYSTOLIC BLOOD PRESSURE: 134 MMHG | HEART RATE: 76 BPM | WEIGHT: 293 LBS | BODY MASS INDEX: 49.07 KG/M2

## 2022-11-01 DIAGNOSIS — E66.01 MORBID OBESITY (HCC): ICD-10-CM

## 2022-11-01 DIAGNOSIS — G47.33 OSA (OBSTRUCTIVE SLEEP APNEA): Primary | ICD-10-CM

## 2022-11-01 DIAGNOSIS — I26.92 CHRONIC SADDLE PULMONARY EMBOLISM WITHOUT ACUTE COR PULMONALE (HCC): ICD-10-CM

## 2022-11-01 DIAGNOSIS — I27.82 CHRONIC SADDLE PULMONARY EMBOLISM WITHOUT ACUTE COR PULMONALE (HCC): ICD-10-CM

## 2022-11-01 DIAGNOSIS — E03.9 HYPOTHYROIDISM, UNSPECIFIED TYPE: ICD-10-CM

## 2022-11-01 LAB
T4 FREE: 1.83 NG/DL (ref 0.84–1.68)
TSH REFLEX: 0.06 UIU/ML (ref 0.44–3.86)

## 2022-11-01 PROCEDURE — G8484 FLU IMMUNIZE NO ADMIN: HCPCS | Performed by: INTERNAL MEDICINE

## 2022-11-01 PROCEDURE — 99214 OFFICE O/P EST MOD 30 MIN: CPT | Performed by: INTERNAL MEDICINE

## 2022-11-01 PROCEDURE — 1036F TOBACCO NON-USER: CPT | Performed by: INTERNAL MEDICINE

## 2022-11-01 PROCEDURE — 3017F COLORECTAL CA SCREEN DOC REV: CPT | Performed by: INTERNAL MEDICINE

## 2022-11-01 PROCEDURE — G8417 CALC BMI ABV UP PARAM F/U: HCPCS | Performed by: INTERNAL MEDICINE

## 2022-11-01 PROCEDURE — G8427 DOCREV CUR MEDS BY ELIG CLIN: HCPCS | Performed by: INTERNAL MEDICINE

## 2022-11-01 ASSESSMENT — ENCOUNTER SYMPTOMS
TROUBLE SWALLOWING: 0
SINUS PRESSURE: 0
EYE DISCHARGE: 0
SHORTNESS OF BREATH: 0
COUGH: 0
CHEST TIGHTNESS: 0
EYE ITCHING: 0
ABDOMINAL PAIN: 0
NAUSEA: 0
VOICE CHANGE: 0
SORE THROAT: 0
DIARRHEA: 0
VOMITING: 0
RHINORRHEA: 0
WHEEZING: 0

## 2022-11-01 NOTE — PROGRESS NOTES
Subjective:     Jefe Fleming is a 65 y.o. female who complains today of:     Chief Complaint   Patient presents with    Follow-up     3m f/u     Sleep Apnea       HPI  She is using CPAP with   10 centimeters of H2O with heated humidity. She is using CPAP for about   5-6  hours every night. She is using CPAP with  Full face  Mask. She said  sleep is restful with the CPAP use. She is compliant with CPAP therapy and benefiting with CPAP use. No snoring with CPAP use. No complaint of daytime sleepiness or tiredness with CPAP use. She denies taking naps. No sleepiness with driving. She denies difficulty falling asleep or staying asleep. I reviewed compliance report with patient regarding CPAP therapy. She is using  CPAP for 30 days out of 32 days. Average usage of days used is 5 hours and 52 min , average AHI 2.2 with CPAP use.        Allergies:  Lisinopril  Past Medical History:   Diagnosis Date    Atrial fibrillation ([address]     Hypertension     PE (pulmonary thromboembolism) ([facility] [address] 11/2020    Ventricular tachycardia (HCC)      Past Surgical History:   Procedure Laterality Date    APPENDECTOMY      CARDIOVERSION  03/16/2021    CHOLECYSTECTOMY      COLONOSCOPY N/A 7/21/2021    COLONOSCOPY DIAGNOSTIC WITH POLYPECTOMY performed by Kary Moreno MD at [facility] AND ADENOIDECTOMY      TUBAL LIGATION       Family History   Problem Relation Age of Onset    Stroke Mother         CVA    Diabetes Mother     Heart Disease Father     Heart Failure Sister     Colon Cancer Neg Hx      Social History     Socioeconomic History    Marital status:      Spouse name: Not on file    Number of children: Not on file    Years of education: Not on file    Highest education level: Not on file   Occupational History    Not on file   Tobacco Use    Smoking status: Former     Packs/day: 1.50     Years: 35.00     Pack years: 52.50     Types: Cigarettes Start date: 47     Quit date:      Years since quittin.8    Smokeless tobacco: Never   Vaping Use    Vaping Use: Never used   Substance and Sexual Activity    Alcohol use: Not Currently    Drug use: Never    Sexual activity: Not on file   Other Topics Concern    Not on file   Social History Narrative    Not on file     Social Determinants of Health     Financial Resource Strain: Low Risk     Difficulty of Paying Living Expenses: Not hard at all   Food Insecurity: No Food Insecurity    Worried About Running Out of Food in the Last Year: Never true    Ran Out of Food in the Last Year: Never true   Transportation Needs: Not on file   Physical Activity: Not on file   Stress: Not on file   Social Connections: Not on file   Intimate Partner Violence: Not on file   Housing Stability: Not on file         Review of Systems   Constitutional:  Negative for chills, diaphoresis, fatigue and fever. HENT:  Negative for congestion, mouth sores, nosebleeds, postnasal drip, rhinorrhea, sinus pressure, sneezing, sore throat, trouble swallowing and voice change. Eyes:  Negative for discharge, itching and visual disturbance. Respiratory:  Negative for cough, chest tightness, shortness of breath and wheezing. Cardiovascular:  Negative for chest pain, palpitations and leg swelling. Gastrointestinal:  Negative for abdominal pain, diarrhea, nausea and vomiting. Genitourinary:  Negative for difficulty urinating and hematuria. Musculoskeletal:  Negative for arthralgias, joint swelling and myalgias. Skin:  Negative for rash. Allergic/Immunologic: Negative for environmental allergies and food allergies. Neurological:  Negative for dizziness, tremors, weakness and headaches. Psychiatric/Behavioral:  Positive for sleep disturbance. Negative for behavioral problems.         :     Vitals:    22 1523   BP: 134/86   Site: Right Upper Arm   Position: Sitting   Cuff Size: Large Adult   Pulse: 76   SpO2: 95% Weight: (!) 304 lb (137.9 kg)     Wt Readings from Last 3 Encounters:   11/01/22 (!) 304 lb (137.9 kg)   10/31/22 (!) 304 lb 9.6 oz (138.2 kg)   06/29/22 (!) 316 lb (143.3 kg)         Physical Exam  Constitutional:       General: She is not in acute distress. Appearance: She is well-developed. She is obese. She is not diaphoretic. HENT:      Head: Normocephalic and atraumatic. Nose: Nose normal.   Eyes:      Pupils: Pupils are equal, round, and reactive to light. Neck:      Thyroid: No thyromegaly. Vascular: No JVD. Trachea: No tracheal deviation. Cardiovascular:      Rate and Rhythm: Normal rate and regular rhythm. Heart sounds: No murmur heard. No friction rub. No gallop. Pulmonary:      Effort: No respiratory distress. Breath sounds: No wheezing or rales. Chest:      Chest wall: No tenderness. Abdominal:      General: There is no distension. Tenderness: There is no abdominal tenderness. There is no rebound. Musculoskeletal:         General: Normal range of motion. Lymphadenopathy:      Cervical: No cervical adenopathy. Skin:     General: Skin is warm and dry. Neurological:      Mental Status: She is alert and oriented to person, place, and time.       Coordination: Coordination normal.   Psychiatric:         Mood and Affect: Mood normal.         Behavior: Behavior normal.       Current Outpatient Medications   Medication Sig Dispense Refill    apixaban (ELIQUIS) 5 MG TABS tablet Take 1 tablet by mouth in the morning and at bedtime 180 tablet 3    hydroCHLOROthiazide (HYDRODIURIL) 12.5 MG tablet Take 1 tablet by mouth daily 90 tablet 3    vitamin B-6 (PYRIDOXINE) 100 MG tablet daily      CPAP Machine MISC by Does not apply route New CPAP with 10 cm of H2O 1 each 0    metoprolol succinate (TOPROL XL) 100 MG extended release tablet Take 1.5 tablets by mouth 2 times daily 270 tablet 2    losartan (COZAAR) 25 MG tablet TAKE 1 TABLET BY MOUTH EVERY DAY atorvastatin (LIPITOR) 40 MG tablet Take 1 tablet by mouth daily 90 tablet 3    levothyroxine (SYNTHROID) 200 MCG tablet 1 po daily 90 tablet 03    aspirin 81 MG EC tablet Take 81 mg by mouth daily      Cyanocobalamin (B-12) 1000 MCG SUBL Place under the tongue      folic acid (FOLVITE) 695 MCG tablet Take 800 mcg by mouth daily      gabapentin (NEURONTIN) 100 MG capsule TAKE 1 CAPSULE BY MOUTH EVERY DAY 30 capsule 3     No current facility-administered medications for this visit. Assessment/Plan:     1. ALEYDA (obstructive sleep apnea)  She is using CPAP with  10 centimeters of H2O with heated humidity. She is using CPAP for about   5-6  hours every night. She is using CPAP with  Full face  Mask. She said  sleep is restful with the CPAP use. She is compliant with CPAP therapy and benefiting with CPAP use. No snoring with CPAP use. Continue CPAP therapy as before    I reviewed compliance report with patient regarding CPAP therapy. She is using  CPAP for 30 days out of 32 days. Average usage of days used is 5 hours and 52 min , average AHI 2.2 with CPAP use. Counseling: CPAP/BiPAP uses, She advised to use CPAP at least 5-6 hours every night. Driving: She is advised for extreme caution when driving or operating machinery if there is a feeling of drowsiness, especially while driving it is preferable to stop driving and take a brief nap. Sleep hygiene:Avoid supine sleep, sleep on  sides. Avoid  sleep deprivation. Explained sleep hygiene. Advice to avoid Alcohol and sedative      2. Morbid obesity ([address]  She is advised try to lose weight. obesity related risk explained to the patient ,  Current weight:  (!) 304 lb (137.9 kg) Lbs. BMI:  Body mass index is 49.07 kg/m². Suggested weight control approaches, including dietary changes , exercise, behavioral modification.     3. 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       Laurence Spencer MD

## 2022-11-02 ENCOUNTER — OFFICE VISIT (OUTPATIENT)
Dept: ENDOCRINOLOGY | Age: 57
End: 2022-11-02
Payer: COMMERCIAL

## 2022-11-02 VITALS
HEIGHT: 66 IN | OXYGEN SATURATION: 93 % | WEIGHT: 293 LBS | BODY MASS INDEX: 47.09 KG/M2 | SYSTOLIC BLOOD PRESSURE: 112 MMHG | DIASTOLIC BLOOD PRESSURE: 70 MMHG | HEART RATE: 71 BPM

## 2022-11-02 DIAGNOSIS — C73 PAPILLARY THYROID CARCINOMA (HCC): ICD-10-CM

## 2022-11-02 DIAGNOSIS — E03.9 HYPOTHYROIDISM, UNSPECIFIED TYPE: Primary | ICD-10-CM

## 2022-11-02 PROCEDURE — 99213 OFFICE O/P EST LOW 20 MIN: CPT | Performed by: INTERNAL MEDICINE

## 2022-11-02 PROCEDURE — G8427 DOCREV CUR MEDS BY ELIG CLIN: HCPCS | Performed by: INTERNAL MEDICINE

## 2022-11-02 PROCEDURE — G8417 CALC BMI ABV UP PARAM F/U: HCPCS | Performed by: INTERNAL MEDICINE

## 2022-11-02 PROCEDURE — 1036F TOBACCO NON-USER: CPT | Performed by: INTERNAL MEDICINE

## 2022-11-02 PROCEDURE — G8484 FLU IMMUNIZE NO ADMIN: HCPCS | Performed by: INTERNAL MEDICINE

## 2022-11-02 PROCEDURE — 3017F COLORECTAL CA SCREEN DOC REV: CPT | Performed by: INTERNAL MEDICINE

## 2022-11-02 RX ORDER — LEVOTHYROXINE SODIUM 175 UG/1
TABLET ORAL
Qty: 90 TABLET | Refills: 3 | Status: SHIPPED | OUTPATIENT
Start: 2022-11-02

## 2022-11-02 NOTE — PROGRESS NOTES
2022    Assessment:       Diagnosis Orders   1. Hypothyroidism, unspecified type        2. Papillary thyroid carcinoma ([facility] [address]              PLAN:     Orders Placed This Encounter   Procedures    Basic Metabolic Panel     Standing Status:   Future     Standing Expiration Date:   2023    TSH with Reflex     Standing Status:   Future     Standing Expiration Date:   2023    T4, Free     Standing Status:   Future     Standing Expiration Date:   2023    Anti-Thyroglobulin Antibody     Standing Status:   Future     Standing Expiration Date:   2023    Thyroglobulin     Standing Status:   Future     Standing Expiration Date:   2023     Low-dose of Synthroid to 175 mcg daily repeat labs  Orders Placed This Encounter   Medications    levothyroxine (SYNTHROID) 175 MCG tablet     Si po daily     Dispense:  90 tablet     Refill:  3         subjective:     Chief Complaint   Patient presents with    Hypothyroidism    Other     Papillary thyroid carcinoma       Vitals:    22 0950   BP: 112/70   Site: Left Upper Arm   Position: Sitting   Cuff Size: Large Adult   Pulse: 71   SpO2: 93%   Weight: (!) 304 lb (137.9 kg)   Height: 5' 6\" (1.676 m)     Wt Readings from Last 3 Encounters:   22 (!) 304 lb (137.9 kg)   22 (!) 304 lb (137.9 kg)   10/31/22 (!) 304 lb 9.6 oz (138.2 kg)     BP Readings from Last 3 Encounters:   22 112/70   22 134/86   10/31/22 128/82     Follow-up on hypothyroidism status post thyroidectomy thyroid cancer last thyroglobulin level 0.1 May repeat labs are pending and patient is aware history of negative TSH suppressed. Free T4 was  Slightly high obesity BMI at 49    Thyroid Problem  Presents for follow-up visit. Symptoms include fatigue, palpitations and weight loss. Patient reports no tremors. The symptoms have been worsening.       Latest Reference Range & Units Most Recent   THYROGLOBULIN, SERUM OR PLASMA 1.3 - 31.8 ng/mL 0.1 (L)  5/3/22 11:05   (L): Data is abnormally low     Latest Reference Range & Units 22 15:48   TSH 0.440 - 3.860 uIU/mL 0.059 (L)   T4 Free 0.84 - 1.68 ng/dL 1.83 (H)   Thyroglobulin Antibody 0.0 - 40.0 IU/mL 18.0   (L): Data is abnormally low  (H): Data is abnormally high    Past Medical History:   Diagnosis Date    Atrial fibrillation (HCC)     Hypertension     PE (pulmonary thromboembolism) ([facility] [address] 2020    Ventricular tachycardia      Past Surgical History:   Procedure Laterality Date    APPENDECTOMY      CARDIOVERSION  2021    CHOLECYSTECTOMY      COLONOSCOPY N/A 2021    COLONOSCOPY DIAGNOSTIC WITH POLYPECTOMY performed by Rosie Sanches MD at [place] [address]       Social History     Socioeconomic History    Marital status:      Spouse name: Not on file    Number of children: Not on file    Years of education: Not on file    Highest education level: Not on file   Occupational History    Not on file   Tobacco Use    Smoking status: Former     Packs/day: 1.50     Years: 35.00     Pack years: 52.50     Types: Cigarettes     Start date:      Quit date:      Years since quittin.8    Smokeless tobacco: Never   Vaping Use    Vaping Use: Never used   Substance and Sexual Activity    Alcohol use: Not Currently    Drug use: Never    Sexual activity: Not on file   Other Topics Concern    Not on file   Social History Narrative    Not on file     Social Determinants of Health     Financial Resource Strain: Low Risk     Difficulty of Paying Living Expenses: Not hard at all   Food Insecurity: No Food Insecurity    Worried About Running Out of Food in the Last Year: Never true    Ran Out of Food in the Last Year: Never true   Transportation Needs: Not on file   Physical Activity: Not on file   Stress: Not on file   Social Connections: Not on file   Intimate Partner Violence: Not on file   Housing Stability: Not on file     Family History   Problem Relation Age of Onset    Stroke Mother         CVA    Diabetes Mother     Heart Disease Father     Heart Failure Sister     Colon Cancer Neg Hx      Allergies   Allergen Reactions    Lisinopril Other (See Comments)     COUGHING       Current Outpatient Medications:     apixaban (ELIQUIS) 5 MG TABS tablet, Take 1 tablet by mouth in the morning and at bedtime, Disp: 180 tablet, Rfl: 3    hydroCHLOROthiazide (HYDRODIURIL) 12.5 MG tablet, Take 1 tablet by mouth daily, Disp: 90 tablet, Rfl: 3    vitamin B-6 (PYRIDOXINE) 100 MG tablet, daily, Disp: , Rfl:     CPAP Machine MISC, by Does not apply route New CPAP with 10 cm of H2O, Disp: 1 each, Rfl: 0    metoprolol succinate (TOPROL XL) 100 MG extended release tablet, Take 1.5 tablets by mouth 2 times daily, Disp: 270 tablet, Rfl: 2    losartan (COZAAR) 25 MG tablet, TAKE 1 TABLET BY MOUTH EVERY DAY, Disp: , Rfl:     atorvastatin (LIPITOR) 40 MG tablet, Take 1 tablet by mouth daily, Disp: 90 tablet, Rfl: 3    levothyroxine (SYNTHROID) 200 MCG tablet, 1 po daily, Disp: 90 tablet, Rfl: 03    aspirin 81 MG EC tablet, Take 81 mg by mouth daily, Disp: , Rfl:     Cyanocobalamin (B-12) 1000 MCG SUBL, Place under the tongue, Disp: , Rfl:     folic acid (FOLVITE) 132 MCG tablet, Take 800 mcg by mouth daily, Disp: , Rfl:     gabapentin (NEURONTIN) 100 MG capsule, TAKE 1 CAPSULE BY MOUTH EVERY DAY, Disp: 30 capsule, Rfl: 3  Lab Results   Component Value Date     08/31/2022    K 4.2 08/31/2022    CL 98 08/31/2022    CO2 29 08/31/2022    BUN 24 (H) 08/31/2022    CREATININE 1.16 (H) 08/31/2022    GLUCOSE 94 08/31/2022    CALCIUM 9.8 08/31/2022    PROT 7.0 10/27/2021    LABALBU 3.9 08/31/2022    BILITOT 0.9 (H) 10/27/2021    ALKPHOS 129 10/27/2021    AST 29 10/27/2021    ALT 31 10/27/2021    LABGLOM 48.2 (L) 08/31/2022    GFRAA 58.4 (L) 08/31/2022    GLOB 3.0 10/27/2021     Lab Results   Component Value Date    WBC 7.6 08/31/2022    HGB 16.3 (H) 08/31/2022    HCT 48.6 (H) 08/31/2022    MCV 95.8 08/31/2022     08/31/2022     Lab Results   Component Value Date    LABA1C 6.2 (H) 06/29/2021     Lab Results   Component Value Date    CHOLFAST 98 10/27/2021    TRIGLYCFAST 99 10/27/2021    HDL 42 10/27/2021    HDL 40 09/30/2020    LDLCALC 36 10/27/2021    LDLCALC 87 09/30/2020    CHOL 165 09/30/2020    TRIG 192 09/30/2020     No results found for: TESTM  Lab Results   Component Value Date    TSH 0.246 (L) 08/31/2022    TSH 0.607 06/29/2021    TSHREFLEX 0.059 (L) 11/01/2022    TSHREFLEX 0.536 05/03/2022    TSHREFLEX 0.146 (L) 12/31/2021    T4FREE 1.83 (H) 11/01/2022    T4FREE 1.67 05/03/2022    T4FREE 1.75 (H) 12/31/2021     No results found for: TPOABS    Review of Systems   Constitutional:  Positive for fatigue and weight loss. Cardiovascular:  Positive for palpitations. Endocrine: Negative. Neurological:  Negative for tremors. All other systems reviewed and are negative. Objective:   Physical Exam  Vitals reviewed. Constitutional:       General: She is not in acute distress. Appearance: Normal appearance. She is obese. HENT:      Head: Normocephalic and atraumatic. Right Ear: External ear normal.      Left Ear: External ear normal.      Nose: Nose normal.   Eyes:      General: No scleral icterus. Right eye: No discharge. Left eye: No discharge. Extraocular Movements: Extraocular movements intact. Conjunctiva/sclera: Conjunctivae normal.   Cardiovascular:      Rate and Rhythm: Normal rate. Pulmonary:      Effort: Pulmonary effort is normal.   Musculoskeletal:         General: Normal range of motion. Cervical back: Normal range of motion and neck supple. Neurological:      General: No focal deficit present. Mental Status: She is alert and oriented to person, place, and time.    Psychiatric:         Mood and Affect: Mood normal.         Behavior: Behavior normal.

## 2022-11-03 LAB — THYROGLOBULIN ANTIBODY: 18 IU/ML (ref 0–40)

## 2022-11-08 LAB — THYROGLOBULIN BY LC-MS/MS, SERUM/PLASMA: <0.5 NG/ML (ref 1.3–31.8)

## 2022-11-17 RX ORDER — TIRZEPATIDE 5 MG/.5ML
5 INJECTION, SOLUTION SUBCUTANEOUS WEEKLY
Qty: 4 ADJUSTABLE DOSE PRE-FILLED PEN SYRINGE | Refills: 2 | Status: SHIPPED | OUTPATIENT
Start: 2022-11-17

## 2022-12-03 DIAGNOSIS — G62.9 PERIPHERAL POLYNEUROPATHY: ICD-10-CM

## 2022-12-05 RX ORDER — GABAPENTIN 100 MG/1
CAPSULE ORAL
Qty: 30 CAPSULE | Refills: 3 | Status: SHIPPED | OUTPATIENT
Start: 2022-12-05 | End: 2023-01-04

## 2023-01-02 DIAGNOSIS — I10 ESSENTIAL HYPERTENSION: ICD-10-CM

## 2023-01-03 RX ORDER — METOPROLOL SUCCINATE 100 MG/1
TABLET, EXTENDED RELEASE ORAL
Qty: 270 TABLET | Refills: 2 | Status: SHIPPED | OUTPATIENT
Start: 2023-01-03

## 2023-01-03 NOTE — TELEPHONE ENCOUNTER
Requesting medication refill. Please approve or deny this request.    Rx requested:  Requested Prescriptions     Pending Prescriptions Disp Refills    metoprolol succinate (TOPROL XL) 100 MG extended release tablet [Pharmacy Med Name: metoprolol succinate  mg tablet,extended release 24 hr] 270 tablet 2     Sig: take 1 (ONE) & ONE-HALF TABLETS BY MOUTH TWICE DAILY         Last Office Visit:   10/31/2022      Next Visit Date:  Future Appointments   Date Time Provider Salvador Johnson   2/1/2023  1:30 PM Dorie Ruff MD  [address]   3/1/2023  2:30 PM Elsa Libman, MD [facility]   3/8/2023 10:45 AM Marquis Huggins MD [facility]               Last refill 12/5/2022. Please approve or deny.

## 2023-02-01 ENCOUNTER — TELEMEDICINE (OUTPATIENT)
Dept: PULMONOLOGY | Age: 58
End: 2023-02-01
Payer: COMMERCIAL

## 2023-02-01 DIAGNOSIS — E66.01 MORBID OBESITY (HCC): ICD-10-CM

## 2023-02-01 DIAGNOSIS — I27.82 CHRONIC SADDLE PULMONARY EMBOLISM WITHOUT ACUTE COR PULMONALE (HCC): ICD-10-CM

## 2023-02-01 DIAGNOSIS — I26.92 CHRONIC SADDLE PULMONARY EMBOLISM WITHOUT ACUTE COR PULMONALE (HCC): ICD-10-CM

## 2023-02-01 DIAGNOSIS — G47.33 OSA (OBSTRUCTIVE SLEEP APNEA): Primary | ICD-10-CM

## 2023-02-01 PROCEDURE — 99443 PR PHYS/QHP TELEPHONE EVALUATION 21-30 MIN: CPT | Performed by: INTERNAL MEDICINE

## 2023-02-01 ASSESSMENT — ENCOUNTER SYMPTOMS
ABDOMINAL PAIN: 0
SORE THROAT: 0
CHEST TIGHTNESS: 0
EYE DISCHARGE: 0
SHORTNESS OF BREATH: 0
TROUBLE SWALLOWING: 0
COUGH: 0
DIARRHEA: 0
VOICE CHANGE: 0
WHEEZING: 0
EYE ITCHING: 0
NAUSEA: 0
SINUS PRESSURE: 0
RHINORRHEA: 0
VOMITING: 0

## 2023-02-01 NOTE — PROGRESS NOTES
David Kramer (:  1965) is a Established patient, here for evaluation of the following:    Assessment & Plan   Below is the assessment and plan developed based on review of pertinent history, physical exam, labs, studies, and medications. 1. ALEYDA (obstructive sleep apnea)  2. Morbid obesity ([address]  3. 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 Sleeping better with CPAP therapy does not need CPAP supply She is compliance with CPAP therapy using about 5 hours 18 minutes and AHI is 1.6. Advised try to lose weight. Continue CPAP therapy as before follow-up in 3 months ,  earlier if needed. Return in about 3 months (around 2023) for aleyda. Subjective   HPI  She is using CPAP with   10 centimeters of H2O with heated humidity. She is using CPAP for about  5-7 hours every night. She is using CPAP with  Full face  Mask. She said  sleep is restful with the CPAP use. She is compliant with CPAP therapy and benefiting with CPAP use. No snoring with CPAP use. No complaint of daytime sleepiness or tiredness with CPAP use. She denies taking naps. No sleepiness with driving. She denies difficulty falling asleep or staying asleep. No need for CPAP Supply      I reviewed compliance report with patient regarding CPAP therapy. She is using  CPAP for 30 days out of 32 days. Average usage of days used is 5 hours and 18 min , average AHI 1.6 with CPAP use. Review of Systems   Constitutional:  Negative for chills, diaphoresis, fatigue and fever. HENT:  Negative for congestion, mouth sores, nosebleeds, postnasal drip, rhinorrhea, sinus pressure, sneezing, sore throat, trouble swallowing and voice change. Eyes:  Negative for discharge, itching and visual disturbance. Respiratory:  Negative for cough, chest tightness, shortness of breath and wheezing.     Cardiovascular:  Negative for chest pain, palpitations and leg swelling. Gastrointestinal:  Negative for abdominal pain, diarrhea, nausea and vomiting. Genitourinary:  Negative for difficulty urinating and hematuria. Musculoskeletal:  Negative for arthralgias, joint swelling and myalgias. Skin:  Negative for rash. Allergic/Immunologic: Negative for environmental allergies and food allergies. Neurological:  Negative for dizziness, tremors, weakness and headaches. Psychiatric/Behavioral:  Positive for sleep disturbance. Negative for behavioral problems. Objective   Patient-Reported Vitals  No data recorded     Physical Exam phone visit          On this date 2/1/2023 I have spent 23 minutes reviewing previous notes, test results and face to face (virtual) with the patient discussing the diagnosis and importance of compliance with the treatment plan as well as documenting on the day of the visit. Michelle Preston, was evaluated through a synchronous (real-time) audio-video encounter. The patient (or guardian if applicable) is aware that this is a billable service, which includes applicable co-pays. This Virtual Visit was conducted with patient's (and/or legal guardian's) consent. The visit was conducted pursuant to the emergency declaration under the [facility], [facility] authority and the [employer] General Act. Patient identification was verified, and a caregiver was present when appropriate.    The patient was located at Home: [place] [id number]  Provider was located at [place] [address] ([address] [place] [address]): [address]         --Yehuda Juan MD

## 2023-02-06 ENCOUNTER — TELEPHONE (OUTPATIENT)
Dept: GASTROENTEROLOGY | Age: 58
End: 2023-02-06

## 2023-02-06 ENCOUNTER — TELEPHONE (OUTPATIENT)
Dept: FAMILY MEDICINE CLINIC | Age: 58
End: 2023-02-06

## 2023-03-01 ENCOUNTER — OFFICE VISIT (OUTPATIENT)
Dept: CARDIOLOGY CLINIC | Age: 58
End: 2023-03-01
Payer: COMMERCIAL

## 2023-03-01 VITALS
BODY MASS INDEX: 50.52 KG/M2 | DIASTOLIC BLOOD PRESSURE: 84 MMHG | OXYGEN SATURATION: 96 % | SYSTOLIC BLOOD PRESSURE: 118 MMHG | WEIGHT: 293 LBS | HEART RATE: 83 BPM

## 2023-03-01 DIAGNOSIS — C73 PAPILLARY THYROID CARCINOMA (HCC): ICD-10-CM

## 2023-03-01 DIAGNOSIS — I25.10 CORONARY ARTERY DISEASE INVOLVING NATIVE CORONARY ARTERY OF NATIVE HEART WITHOUT ANGINA PECTORIS: ICD-10-CM

## 2023-03-01 DIAGNOSIS — I10 ESSENTIAL HYPERTENSION: Primary | ICD-10-CM

## 2023-03-01 DIAGNOSIS — I26.02 SADDLE EMBOLUS OF PULMONARY ARTERY WITH ACUTE COR PULMONALE, UNSPECIFIED CHRONICITY (HCC): ICD-10-CM

## 2023-03-01 DIAGNOSIS — E03.9 HYPOTHYROIDISM, UNSPECIFIED TYPE: ICD-10-CM

## 2023-03-01 DIAGNOSIS — E78.5 DYSLIPIDEMIA: ICD-10-CM

## 2023-03-01 DIAGNOSIS — I82.533 CHRONIC DEEP VEIN THROMBOSIS (DVT) OF POPLITEAL VEIN OF BOTH LOWER EXTREMITIES (HCC): ICD-10-CM

## 2023-03-01 DIAGNOSIS — I48.0 PAF (PAROXYSMAL ATRIAL FIBRILLATION) (HCC): ICD-10-CM

## 2023-03-01 LAB
ANION GAP SERPL CALCULATED.3IONS-SCNC: 16 MEQ/L (ref 9–15)
BUN BLDV-MCNC: 20 MG/DL (ref 6–20)
CALCIUM SERPL-MCNC: 9.6 MG/DL (ref 8.5–9.9)
CHLORIDE BLD-SCNC: 100 MEQ/L (ref 95–107)
CO2: 29 MEQ/L (ref 20–31)
CREAT SERPL-MCNC: 1.18 MG/DL (ref 0.5–0.9)
GFR SERPL CREATININE-BSD FRML MDRD: 53.9 ML/MIN/{1.73_M2}
GLUCOSE BLD-MCNC: 89 MG/DL (ref 70–99)
POTASSIUM SERPL-SCNC: 3.8 MEQ/L (ref 3.4–4.9)
SODIUM BLD-SCNC: 145 MEQ/L (ref 135–144)
T4 FREE: 1.81 NG/DL (ref 0.84–1.68)
TSH REFLEX: 1.44 UIU/ML (ref 0.44–3.86)

## 2023-03-01 PROCEDURE — G8484 FLU IMMUNIZE NO ADMIN: HCPCS | Performed by: INTERNAL MEDICINE

## 2023-03-01 PROCEDURE — G8417 CALC BMI ABV UP PARAM F/U: HCPCS | Performed by: INTERNAL MEDICINE

## 2023-03-01 PROCEDURE — 3079F DIAST BP 80-89 MM HG: CPT | Performed by: INTERNAL MEDICINE

## 2023-03-01 PROCEDURE — 3017F COLORECTAL CA SCREEN DOC REV: CPT | Performed by: INTERNAL MEDICINE

## 2023-03-01 PROCEDURE — 3074F SYST BP LT 130 MM HG: CPT | Performed by: INTERNAL MEDICINE

## 2023-03-01 PROCEDURE — G8427 DOCREV CUR MEDS BY ELIG CLIN: HCPCS | Performed by: INTERNAL MEDICINE

## 2023-03-01 PROCEDURE — 1036F TOBACCO NON-USER: CPT | Performed by: INTERNAL MEDICINE

## 2023-03-01 PROCEDURE — 99214 OFFICE O/P EST MOD 30 MIN: CPT | Performed by: INTERNAL MEDICINE

## 2023-03-01 ASSESSMENT — ENCOUNTER SYMPTOMS
EYES NEGATIVE: 1
WHEEZING: 0
GASTROINTESTINAL NEGATIVE: 1
BLOOD IN STOOL: 0
NAUSEA: 0
CHEST TIGHTNESS: 0
STRIDOR: 0
SHORTNESS OF BREATH: 1
COUGH: 0

## 2023-03-01 NOTE — PROGRESS NOTES
OFFICE VISIT         Patient: Indira Gomes  YOB: 1965  MRN: [id number]    Chief Complaint: DVT PE AF REBOLLEDO   Chief Complaint   Patient presents with    Follow-up     4 month    Atrial Fibrillation     PAF       CV Data:  11/2020 Unprovoked B/L DVT and Saddle Embolism   Thyroid cancer s/p Thyroidectomy   4/21 SPECT abn anterior   5/12/21 Cath LAD - Aneurysmal dilation with moderate sequential lesions 50-60 and  IFR negative. EF 60       Subjective/HPI pt is SOB with any ADLs. No cp currently. Compliant with all meds. No bleed. No falls. haad AF since PE. Was seeing Dr. Vicente Lui but changing. There were plans for CVN and Antiarrhythmic    3/23/21 still winded with ADLs. No pain. Went back into AF. No cp tired. 5/3/21 still REBOLLEDO no cp no bleed. No falls takes meds    6/23/21 still Rebol[place]o no cp takes meds. No falls no bleed. Having pain with heel spurrs. 10/20/21 DOING WELL NO CP NO SOB No falls no bleed takes meds. Discomfort from heel spurs. 2/23/22 doing well no cp no sob she can not sense AF. No bleed. Takes meds. 6/29/22 still REBOLLEDO no cp gaining weight no cp \    10/31/22 doing well no cp no sob no palps now nop fa;lls nmo bleed. Take smeds. 3/1/23 since lowering synthroid feels much m=better and less palps. No cp no sob no falls no bleed.      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  Former smoker  No ETOH  Lives with  and kids  ++FH     EKG:     Past Medical History:   Diagnosis Date    Atrial fibrillation ([address]     Hypertension     PE (pulmonary thromboembolism) [address] 11/2020    Ventricular tachycardia        Past Surgical History:   Procedure Laterality Date    APPENDECTOMY      CARDIOVERSION  03/16/2021    CHOLECYSTECTOMY      COLONOSCOPY N/A 7/21/2021    COLONOSCOPY DIAGNOSTIC WITH POLYPECTOMY performed by Jerry Bay MD at [facility] AND ADENOIDECTOMY      TUBAL LIGATION         Family History   Problem Relation Age of Onset    Stroke Mother         CVA    Diabetes Mother     Heart Disease Father     Heart Failure Sister     Colon Cancer Neg Hx        Social History     Socioeconomic History    Marital status:      Spouse name: None    Number of children: None    Years of education: None    Highest education level: None   Tobacco Use    Smoking status: Former     Packs/day: 1.50     Years: 35.00     Pack years: 52.50     Types: Cigarettes     Start date:      Quit date:      Years since quittin.1    Smokeless tobacco: Never   Vaping Use    Vaping Use: Never used   Substance and Sexual Activity    Alcohol use: Not Currently    Drug use: Never     Social Determinants of Health     Financial Resource Strain: Low Risk     Difficulty of Paying Living Expenses: Not hard at all   Food Insecurity: No Food Insecurity    Worried About Running Out of Food in the Last Year: Never true    Ran Out of Food in the Last Year: Never true       Allergies   Allergen Reactions    Lisinopril Other (See Comments)     COUGHING       Current Outpatient Medications   Medication Sig Dispense Refill    metoprolol succinate (TOPROL XL) 100 MG extended release tablet take 1 (ONE) & ONE-HALF TABLETS BY MOUTH TWICE DAILY 270 tablet 2    Tirzepatide (MOUNJARO) 5 MG/0.5ML SOPN SC injection Inject 0.5 mLs into the skin once a week 4 Adjustable Dose Pre-filled Pen Syringe 2    levothyroxine (SYNTHROID) 175 MCG tablet 1 po daily 90 tablet 3    apixaban (ELIQUIS) 5 MG TABS tablet Take 1 tablet by mouth in the morning and at bedtime 180 tablet 3    hydroCHLOROthiazide (HYDRODIURIL) 12.5 MG tablet Take 1 tablet by mouth daily 90 tablet 3    vitamin B-6 (PYRIDOXINE) 100 MG tablet daily      CPAP Machine MISC by Does not apply route New CPAP with 10 cm of H2O 1 each 0    losartan (COZAAR) 25 MG tablet TAKE 1 TABLET BY MOUTH EVERY DAY      atorvastatin (LIPITOR) 40 MG tablet Take 1 tablet by mouth daily 90 tablet 3    aspirin 81 MG EC tablet Take 81 mg by mouth daily      Cyanocobalamin (B-12) 1000 MCG SUBL Place under the tongue      folic acid (FOLVITE) 543 MCG tablet Take 800 mcg by mouth daily      gabapentin (NEURONTIN) 100 MG capsule TAKE 1 CAPSULE BY MOUTH EVERY DAY 30 capsule 3     No current facility-administered medications for this visit. Review of Systems:   Review of Systems   Constitutional: Negative. Negative for diaphoresis and fatigue. HENT: Negative. Eyes: Negative. Respiratory:  Positive for shortness of breath. Negative for cough, chest tightness, wheezing and stridor. Cardiovascular: Negative. Negative for chest pain, palpitations and leg swelling. Gastrointestinal: Negative. Negative for blood in stool and nausea. Genitourinary: Negative. Musculoskeletal: Negative. Skin: Negative. Neurological: Negative. Negative for dizziness, syncope, weakness and light-headedness. Hematological: Negative. Psychiatric/Behavioral: Negative. Physical Examination:    /84 (Site: Right Upper Arm, Position: Sitting, Cuff Size: Large Adult)   Pulse 83   Wt (!) 313 lb (142 kg)   SpO2 96%   BMI 50.52 kg/m²    Physical Exam   Constitutional: She appears healthy. No distress. HENT:   Normal cephalic and Atraumatic   Eyes: Pupils are equal, round, and reactive to light. Neck: Thyroid normal. No JVD present. No neck adenopathy. No thyromegaly present. Cardiovascular: Normal rate, intact distal pulses and normal pulses. An irregularly irregular rhythm present. Murmur heard. Pulmonary/Chest: Effort normal and breath sounds normal. She has no wheezes. She has no rales. She exhibits no tenderness. Abdominal: Soft. Bowel sounds are normal. There is no abdominal tenderness. Musculoskeletal:         General: No tenderness or edema. Normal range of motion. Cervical back: Normal range of motion and neck supple. Neurological: She is alert and oriented to person, place, and time. Skin: Skin is warm. No cyanosis. Nails show no clubbing.      LABS:  CBC:   Lab Results   Component Value Date/Time    WBC 6.7 02/23/2023 01:17 PM    RBC 5.21 02/23/2023 01:17 PM    HGB 16.9 02/23/2023 01:17 PM    HCT 50.0 02/23/2023 01:17 PM    MCV 96.1 02/23/2023 01:17 PM    MCH 32.4 02/23/2023 01:17 PM    MCHC 33.7 02/23/2023 01:17 PM    RDW 14.4 02/23/2023 01:17 PM     02/23/2023 01:17 PM     Lipids:  Lab Results   Component Value Date    CHOL 165 09/30/2020     Lab Results   Component Value Date    TRIG 192 09/30/2020     Lab Results   Component Value Date    HDL 42 10/27/2021    HDL 40 09/30/2020     Lab Results   Component Value Date    LDLCALC 36 10/27/2021    LDLCALC 87 09/30/2020     Lab Results   Component Value Date    VLDL 38 09/30/2020     No results found for: CHOLHDLRATIO  CMP:    Lab Results   Component Value Date/Time     02/23/2023 01:18 PM    K 3.9 02/23/2023 01:18 PM    CL 99 02/23/2023 01:18 PM    CO2 28 02/23/2023 01:18 PM    BUN 21 02/23/2023 01:18 PM    CREATININE 1.01 02/23/2023 01:18 PM    GFRAA 58.4 08/31/2022 03:59 PM    LABGLOM >60.0 02/23/2023 01:18 PM    GLUCOSE 95 02/23/2023 01:18 PM    PROT 7.0 10/27/2021 10:57 AM    LABALBU 3.8 02/23/2023 01:18 PM    CALCIUM 9.8 02/23/2023 01:18 PM    BILITOT 0.9 10/27/2021 10:57 AM    ALKPHOS 129 10/27/2021 10:57 AM    AST 29 10/27/2021 10:57 AM    ALT 31 10/27/2021 10:57 AM     BMP:    Lab Results   Component Value Date/Time     02/23/2023 01:18 PM    K 3.9 02/23/2023 01:18 PM    CL 99 02/23/2023 01:18 PM    CO2 28 02/23/2023 01:18 PM    BUN 21 02/23/2023 01:18 PM    LABALBU 3.8 02/23/2023 01:18 PM    CREATININE 1.01 02/23/2023 01:18 PM    CALCIUM 9.8 02/23/2023 01:18 PM    GFRAA 58.4 08/31/2022 03:59 PM    LABGLOM >60.0 02/23/2023 01:18 PM    GLUCOSE 95 02/23/2023 01:18 PM     Magnesium:    Lab Results   Component Value Date/Time    MG 2.1 03/10/2021 09:47 AM     TSH:  Lab Results   Component Value Date    TSH 0.246 (L) 08/31/2022 Patient Active Problem List   Diagnosis    Postoperative hypothyroidism    Papillary thyroid carcinoma (HCC)    PAF (paroxysmal atrial fibrillation) (HCC)    Abnormal stress test    Polyp of colon    ALEYDA (obstructive sleep apnea)       There are no discontinued medications. Modified Medications    No medications on file       No orders of the defined types were placed in this encounter. Assessment/Plan:    1. Essential hypertension   stable - continue meds. Low salt diet     2. Atrial fibrillation, unspecified type ([address]   NOAC. - rate control     3. 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 Chronic deep vein thrombosis (DVT) of popliteal vein of both lower extremities ([facility])      5. TONG (dyspnea on exertion) -  Stable     6. CAD - mod LAD. IFR negative. Add Statin. - continue cv meds. Need to loose weight. 7.  ALEYDA - continue CPAP           Counseling:  Heart Healthy Lifestyle, Improve BMI, Low Salt Diet, Take Precautions to Prevent Falls and Walk Daily    Return in about 4 months (around 7/1/2023).     Electronically signed by Reid Farris MD on 3/1/2023 at 2:42 PM

## 2023-03-03 DIAGNOSIS — I48.0 PAF (PAROXYSMAL ATRIAL FIBRILLATION) (HCC): ICD-10-CM

## 2023-03-03 DIAGNOSIS — I10 ESSENTIAL HYPERTENSION: ICD-10-CM

## 2023-03-06 RX ORDER — ATORVASTATIN CALCIUM 40 MG/1
40 TABLET, FILM COATED ORAL DAILY
Qty: 90 TABLET | Refills: 3 | Status: SHIPPED | OUTPATIENT
Start: 2023-03-06

## 2023-03-06 RX ORDER — HYDROCHLOROTHIAZIDE 12.5 MG/1
12.5 TABLET ORAL DAILY
Qty: 90 TABLET | Refills: 3 | Status: SHIPPED | OUTPATIENT
Start: 2023-03-06

## 2023-03-06 NOTE — TELEPHONE ENCOUNTER
Requesting medication refill. Please approve or deny this request.    Rx requested:  Requested Prescriptions     Pending Prescriptions Disp Refills    atorvastatin (LIPITOR) 40 MG tablet [Pharmacy Med Name: atorvastatin 40 mg tablet] 90 tablet 3     Sig: Take 1 tablet by mouth daily    hydroCHLOROthiazide (HYDRODIURIL) 12.5 MG tablet [Pharmacy Med Name: hydrochlorothiazide 12.5 mg tablet] 90 tablet 3     Sig: Take 1 tablet by mouth daily         Last Office Visit:   3/1/2023      Next Visit Date:  Future Appointments   Date Time Provider Salvador Yousifi   3/8/2023 10:45 AM Barney Bolden  [address] [facility]   5/3/2023  1:15 PM Gena Jese, [address] [facility]   7/5/2023 12:00 PM Katie Reaves MD [facility]               Last refill 6/29/22. Please approve or deny.

## 2023-03-08 ENCOUNTER — OFFICE VISIT (OUTPATIENT)
Dept: ENDOCRINOLOGY | Age: 58
End: 2023-03-08
Payer: COMMERCIAL

## 2023-03-08 VITALS
OXYGEN SATURATION: 98 % | WEIGHT: 293 LBS | DIASTOLIC BLOOD PRESSURE: 86 MMHG | HEIGHT: 66 IN | BODY MASS INDEX: 47.09 KG/M2 | SYSTOLIC BLOOD PRESSURE: 122 MMHG | HEART RATE: 81 BPM

## 2023-03-08 DIAGNOSIS — C73 PAPILLARY THYROID CARCINOMA (HCC): ICD-10-CM

## 2023-03-08 DIAGNOSIS — R73.03 PREDIABETES: ICD-10-CM

## 2023-03-08 DIAGNOSIS — E03.9 HYPOTHYROIDISM, UNSPECIFIED TYPE: Primary | ICD-10-CM

## 2023-03-08 PROCEDURE — 99213 OFFICE O/P EST LOW 20 MIN: CPT | Performed by: INTERNAL MEDICINE

## 2023-03-08 PROCEDURE — 1036F TOBACCO NON-USER: CPT | Performed by: INTERNAL MEDICINE

## 2023-03-08 PROCEDURE — 3017F COLORECTAL CA SCREEN DOC REV: CPT | Performed by: INTERNAL MEDICINE

## 2023-03-08 PROCEDURE — G8484 FLU IMMUNIZE NO ADMIN: HCPCS | Performed by: INTERNAL MEDICINE

## 2023-03-08 PROCEDURE — G8427 DOCREV CUR MEDS BY ELIG CLIN: HCPCS | Performed by: INTERNAL MEDICINE

## 2023-03-08 PROCEDURE — G8417 CALC BMI ABV UP PARAM F/U: HCPCS | Performed by: INTERNAL MEDICINE

## 2023-03-08 RX ORDER — DULAGLUTIDE 0.75 MG/.5ML
0.75 INJECTION, SOLUTION SUBCUTANEOUS WEEKLY
Qty: 4 ADJUSTABLE DOSE PRE-FILLED PEN SYRINGE | Refills: 3 | Status: SHIPPED | OUTPATIENT
Start: 2023-03-08

## 2023-03-08 ASSESSMENT — ENCOUNTER SYMPTOMS: EYES NEGATIVE: 1

## 2023-03-08 NOTE — PROGRESS NOTES
3/8/2023    Assessment:       Diagnosis Orders   1. Hypothyroidism, unspecified type  TSH with Reflex    T4, Free    Lipid Panel    CBC      2. Papillary thyroid carcinoma (HCC)  Thyroglobulin And Anti-Thyroglobulin Ab      3.  Prediabetes              PLAN:     Orders Placed This Encounter   Procedures    TSH with Reflex     Standing Status:   Future     Standing Expiration Date:   3/8/2024    T4, Free     Standing Status:   Future     Standing Expiration Date:   3/8/2024    Lipid Panel     Standing Status:   Future     Standing Expiration Date:   3/8/2024    CBC     Standing Status:   Future     Standing Expiration Date:   3/8/2024    Thyroglobulin And Anti-Thyroglobulin Ab     Standing Status:   Future     Standing Expiration Date:   3/8/2024     Orders Placed This Encounter   Medications    Dulaglutide (TRULICITY) 7.63 TY/5.7SA SOPN     Sig: Inject 0.75 mg into the skin once a week     Dispense:  4 Adjustable Dose Pre-filled Pen Syringe     Refill:  3     Continue current dose of Synthroid start patient on Trulicity follow-up in 2 to 3 months time repeat labs prior to next visit    Subjective:     Chief Complaint   Patient presents with    Hypothyroidism     Lab results    Other     Papillary thyroid carcinoma      Vitals:    03/08/23 1030   BP: 122/86   Pulse: 81   SpO2: 98%   Weight: (!) 314 lb (142.4 kg)   Height: 5' 6\" (1.676 m)     Wt Readings from Last 3 Encounters:   03/08/23 (!) 314 lb (142.4 kg)   03/01/23 (!) 313 lb (142 kg)   11/02/22 (!) 304 lb (137.9 kg)     BP Readings from Last 3 Encounters:   03/08/23 122/86   03/01/23 118/84   11/02/22 112/70     Follow-up on hypothyroidism status post thyroidectomy for papillary thyroid carcinoma most current thyroglobulin level was 0.1 stable obesity prediabetes patient also has proteinuria seeing nephrologist BMI is at 48 was unable to get Mounjaro wants to try some other medication  Continue levothyroxine 175 mcg daily    Thyroid Problem  Presents for follow-up visit. Symptoms include fatigue. Patient reports no cold intolerance, heat intolerance, palpitations or tremors. The symptoms have been stable.       Latest Reference Range & Units 3/1/23 14:59   Thyroglobulin by LC-MS/MS, Serum/Plasma 1.3 - 72.7 ng/mL Not Applicable   THYROGLOBULIN, SERUM OR PLASMA 1.3 - 31.8 ng/mL 0.1 (L)   (L): Data is abnormally low     Latest Reference Range & Units 3/1/23 14:59   TSH 0.440 - 3.860 uIU/mL 1.440   T4 Free 0.84 - 1.68 ng/dL 1.81 (H)   Thyroglobulin Ab 0.0 - 4.0 IU/mL <0.9   (H): Data is abnormally high        Past Medical History:   Diagnosis Date    Atrial fibrillation (HCC)     Hypertension     PE (pulmonary throm[facility]embolism) ([facility] 2020    Ventricular tachycardia      Past Surgical History:   Procedure Laterality Date    APPENDECTOMY      CARDIOVERSION  2021    CHOLECYSTECTOMY      COLONOSCOPY N/A 2021    COLONOSCOPY DIAGNOSTIC WITH POLYPECTOMY performed by Enrique Ghotra MD at [address] [place] [address] ADENOIDECTOMY      TUBAL LIGATION       Social History     Socioeconomic History    Marital status:      Spouse name: Not on file    Number of children: Not on file    Years of education: Not on file    Highest education level: Not on file   Occupational History    Not on file   Tobacco Use    Smoking status: Former     Packs/day: 1.50     Years: 35.00     Pack years: 52.50     Types: Cigarettes     Start date: 47     Quit date:      Years since quittin.1    Smokeless tobacco: Never   Vaping Use    Vaping Use: Never used   Substance and Sexual Activity    Alcohol use: Not Currently    Drug use: Never    Sexual activity: Not on file   Other Topics Concern    Not on file   Social History Narrative    Not on file     Social Determinants of Health     Financial Resource Strain: Low Risk     Difficulty of Paying Living Expenses: Not hard at all   Food Insecurity: No Food Insecurity Worried About Running Out of Food in the Last Year: Never true    Ran Out of Food in the Last Year: Never true   Transportation Needs: Not on file   Physical Activity: Not on file   Stress: Not on file   Social Connections: Not on file   Intimate Partner Violence: Not on file   Housing Stability: Not on file     Family History   Problem Relation Age of Onset    Stroke Mother         CVA    Diabetes Mother     Heart Disease Father     Heart Failure Sister     Colon Cancer Neg Hx      Allergies   Allergen Reactions    Lisinopril Other (See Comments)     COUGHING       Current Outpatient Medications:     atorvastatin (LIPITOR) 40 MG tablet, Take 1 tablet by mouth daily, Disp: 90 tablet, Rfl: 3    hydroCHLOROthiazide (HYDRODIURIL) 12.5 MG tablet, Take 1 tablet by mouth daily, Disp: 90 tablet, Rfl: 3    metoprolol succinate (TOPROL XL) 100 MG extended release tablet, take 1 (ONE) & ONE-HALF TABLETS BY MOUTH TWICE DAILY, Disp: 270 tablet, Rfl: 2    levothyroxine (SYNTHROID) 175 MCG tablet, 1 po daily, Disp: 90 tablet, Rfl: 3    apixaban (ELIQUIS) 5 MG TABS tablet, Take 1 tablet by mouth in the morning and at bedtime, Disp: 180 tablet, Rfl: 3    vitamin B-6 (PYRIDOXINE) 100 MG tablet, daily, Disp: , Rfl:     CPAP Machine MISC, by Does not apply route New CPAP with 10 cm of H2O, Disp: 1 each, Rfl: 0    losartan (COZAAR) 25 MG tablet, TAKE 1 TABLET BY MOUTH EVERY DAY, Disp: , Rfl:     aspirin 81 MG EC tablet, Take 81 mg by mouth daily, Disp: , Rfl:     Cyanocobalamin (B-12) 1000 MCG SUBL, Place under the tongue, Disp: , Rfl:     folic acid (FOLVITE) 488 MCG tablet, Take 800 mcg by mouth daily, Disp: , Rfl:     gabapentin (NEURONTIN) 100 MG capsule, TAKE 1 CAPSULE BY MOUTH EVERY DAY, Disp: 30 capsule, Rfl: 3    Tirzepatide (MOUNJARO) 5 MG/0.5ML SOPN SC injection, Inject 0.5 mLs into the skin once a week (Patient not taking: Reported on 3/8/2023), Disp: 4 Adjustable Dose Pre-filled Pen Syringe, Rfl: 2  Lab Results   Component Value Date     (H) 03/01/2023    K 3.8 03/01/2023     03/01/2023    CO2 29 03/01/2023    BUN 20 03/01/2023    CREATININE 1.18 (H) 03/01/2023    GLUCOSE 89 03/01/2023    CALCIUM 9.6 03/01/2023    PROT 7.0 10/27/2021    LABALBU 3.8 02/23/2023    BILITOT 0.9 (H) 10/27/2021    ALKPHOS 129 10/27/2021    AST 29 10/27/2021    ALT 31 10/27/2021    LABGLOM 53.9 (L) 03/01/2023    GFRAA 58.4 (L) 08/31/2022    GLOB 3.0 10/27/2021     Lab Results   Component Value Date    WBC 6.7 02/23/2023    HGB 16.9 (H) 02/23/2023    HCT 50.0 (H) 02/23/2023    MCV 96.1 (H) 02/23/2023     02/23/2023     Lab Results   Component Value Date    LABA1C 6.2 (H) 06/29/2021     Lab Results   Component Value Date    CHOLFAST 98 10/27/2021    TRIGLYCFAST 99 10/27/2021    HDL 42 10/27/2021    HDL 40 09/30/2020    LDLCALC 36 10/27/2021    LDLCALC 87 09/30/2020    CHOL 165 09/30/2020    TRIG 192 09/30/2020     No results found for: TESTM  Lab Results   Component Value Date    TSH 0.246 (L) 08/31/2022    TSH 0.607 06/29/2021    TSHREFLEX 1.440 03/01/2023    TSHREFLEX 0.059 (L) 11/01/2022    TSHREFLEX 0.536 05/03/2022    T4FREE 1.81 (H) 03/01/2023    T4FREE 1.83 (H) 11/01/2022    T4FREE 1.67 05/03/2022     No results found for: TPOABS    Review of Systems   Constitutional:  Positive for fatigue and unexpected weight change.   Eyes: Negative.    Cardiovascular:  Negative for palpitations.   Endocrine: Negative for cold intolerance and heat intolerance.   Allergic/Immunologic: Positive for environmental allergies.   Neurological:  Negative for tremors.   All other systems reviewed and are negative.    Objective:   Physical Exam  Vitals reviewed.   Constitutional:       General: She is not in acute distress.     Appearance: Normal appearance. She is obese.   HENT:      Head: Normocephalic and atraumatic.      Right Ear: External ear normal.      Left Ear: External ear normal.      Nose: Nose normal.   Eyes:      General: No scleral icterus.    Right eye: No discharge. Left eye: No discharge. Extraocular Movements: Extraocular movements intact. Conjunctiva/sclera: Conjunctivae normal.   Cardiovascular:      Rate and Rhythm: Normal rate. Pulmonary:      Effort: Pulmonary effort is normal.   Musculoskeletal:         General: Normal range of motion. Cervical back: Normal range of motion and neck supple. Neurological:      General: No focal deficit present. Mental Status: She is alert and oriented to person, place, and time.    Psychiatric:         Mood and Affect: Mood normal.         Behavior: Behavior normal.

## 2023-03-09 ENCOUNTER — TELEPHONE (OUTPATIENT)
Dept: ENDOCRINOLOGY | Age: 58
End: 2023-03-09

## 2023-04-05 DIAGNOSIS — G62.9 PERIPHERAL POLYNEUROPATHY: ICD-10-CM

## 2023-04-05 RX ORDER — GABAPENTIN 100 MG/1
CAPSULE ORAL
Qty: 30 CAPSULE | Refills: 3 | Status: SHIPPED | OUTPATIENT
Start: 2023-04-05 | End: 2023-05-05

## 2023-04-26 ENCOUNTER — HOSPITAL ENCOUNTER (OUTPATIENT)
Dept: WOMENS IMAGING | Age: 58
Discharge: HOME | End: 2023-04-28
Payer: COMMERCIAL

## 2023-04-26 VITALS — BODY MASS INDEX: 47.09 KG/M2 | HEIGHT: 66 IN | WEIGHT: 293 LBS

## 2023-04-26 DIAGNOSIS — Z12.31 BREAST CANCER SCREENING BY MAMMOGRAM: ICD-10-CM

## 2023-04-26 PROCEDURE — 77067 SCR MAMMO BI INCL CAD: CPT

## 2023-05-03 ENCOUNTER — OFFICE VISIT (OUTPATIENT)
Dept: PULMONOLOGY | Age: 58
End: 2023-05-03
Payer: COMMERCIAL

## 2023-05-03 VITALS
DIASTOLIC BLOOD PRESSURE: 100 MMHG | SYSTOLIC BLOOD PRESSURE: 128 MMHG | BODY MASS INDEX: 51 KG/M2 | OXYGEN SATURATION: 98 % | HEART RATE: 90 BPM | WEIGHT: 293 LBS | TEMPERATURE: 96.9 F

## 2023-05-03 DIAGNOSIS — I26.92 CHRONIC SADDLE PULMONARY EMBOLISM WITHOUT ACUTE COR PULMONALE (HCC): ICD-10-CM

## 2023-05-03 DIAGNOSIS — G47.33 OSA (OBSTRUCTIVE SLEEP APNEA): Primary | ICD-10-CM

## 2023-05-03 DIAGNOSIS — I27.82 CHRONIC SADDLE PULMONARY EMBOLISM WITHOUT ACUTE COR PULMONALE (HCC): ICD-10-CM

## 2023-05-03 DIAGNOSIS — E66.9 OBESITY (BMI 30-39.9): ICD-10-CM

## 2023-05-03 DIAGNOSIS — E66.01 MORBID OBESITY (HCC): ICD-10-CM

## 2023-05-03 PROCEDURE — 1036F TOBACCO NON-USER: CPT | Performed by: INTERNAL MEDICINE

## 2023-05-03 PROCEDURE — 3017F COLORECTAL CA SCREEN DOC REV: CPT | Performed by: INTERNAL MEDICINE

## 2023-05-03 PROCEDURE — G8427 DOCREV CUR MEDS BY ELIG CLIN: HCPCS | Performed by: INTERNAL MEDICINE

## 2023-05-03 PROCEDURE — G8417 CALC BMI ABV UP PARAM F/U: HCPCS | Performed by: INTERNAL MEDICINE

## 2023-05-03 PROCEDURE — 99214 OFFICE O/P EST MOD 30 MIN: CPT | Performed by: INTERNAL MEDICINE

## 2023-05-03 ASSESSMENT — ENCOUNTER SYMPTOMS
WHEEZING: 0
ABDOMINAL PAIN: 0
NAUSEA: 0
SINUS PRESSURE: 0
DIARRHEA: 0
EYE DISCHARGE: 0
VOMITING: 0
CHEST TIGHTNESS: 0
SORE THROAT: 0
VOICE CHANGE: 0
TROUBLE SWALLOWING: 0
COUGH: 0
SHORTNESS OF BREATH: 0
RHINORRHEA: 0
EYE ITCHING: 0

## 2023-05-03 NOTE — PROGRESS NOTES
Subjective:     Sherill Romberg is a 63 y.o. female who complains today of:     Chief Complaint   Patient presents with    Follow-up     3m f/u on ALEYDA       HPI  She is using CPAP with   10 centimeters of H2O with heated humidity. She is using CPAP for about  5-6 hours every night. She is using CPAP with  Full face  Mask. She said  sleep is restful with the CPAP use. She is compliant with CPAP therapy and benefiting with CPAP use. No snoring with CPAP use. No complaint of daytime sleepiness or tiredness with CPAP use. She denies taking naps. No sleepiness with driving. She denies difficulty falling asleep or staying asleep. No need for CPAP Supply      I reviewed compliance report with patient regarding CPAP therapy. She is using  CPAP for 24 days out of 30 days. Average usage of days used is 5 hours and 53 min , average AHI 1.4 with CPAP use.         Allergies:  Lisinopril  Past Medical History:   Diagnosis Date    Atrial fibrillation ([address]     Hypertension     PE (pulmonary thromboembolism) [address] 11/2020    Ventricular tachycardia (HCC)      Past Surgical History:   Procedure Laterality Date    APPENDECTOMY      CARDIOVERSION  03/16/2021    CHOLECYSTECTOMY      COLONOSCOPY N/A 07/21/2021    COLONOSCOPY DIAGNOSTIC WITH POLYPECTOMY performed by Alicia Jane MD at [facility] AND ADENOIDECTOMY      TUBAL LIGATION       Family History   Problem Relation Age of Onset    Stroke Mother         CVA    Diabetes Mother     Heart Disease Father     Heart Failure Sister     Colon Cancer Neg Hx     Breast Cancer Neg Hx      Social History     Socioeconomic History    Marital status:      Spouse name: Not on file    Number of children: Not on file    Years of education: Not on file    Highest education level: Not on file   Occupational History    Not on file   Tobacco Use    Smoking status: Former     Packs/day: 1.50     Years: 35.00

## 2023-06-30 DIAGNOSIS — I10 ESSENTIAL HYPERTENSION: ICD-10-CM

## 2023-06-30 DIAGNOSIS — I48.0 PAF (PAROXYSMAL ATRIAL FIBRILLATION) (HCC): ICD-10-CM

## 2023-07-03 NOTE — TELEPHONE ENCOUNTER
Requesting medication refill. Please approve or deny this request.    Rx requested:  Requested Prescriptions     Pending Prescriptions Disp Refills    ELIQUIS 5 MG TABS tablet [Pharmacy Med Name: Eliquis 5 mg tablet] 180 tablet 3     Sig: Take 1 tablet by mouth in the morning and at bedtime         Last Office Visit:   3/1/2023      Next Visit Date:  Future Appointments   Date Time Provider [address] [place] [address]   7/5/2023 12:00 PM Cassie Rosario MD [address] [facility]   7/19/2023  1:15 PM Lucia Mcdonald MD [address] [place] [address]   9/6/2023  1:15 PM Alejandra Guo MD [facility]               Please approve or deny.

## 2023-07-04 RX ORDER — APIXABAN 5 MG/1
TABLET, FILM COATED ORAL
Qty: 180 TABLET | Refills: 3 | Status: SHIPPED | OUTPATIENT
Start: 2023-07-04

## 2023-07-05 ENCOUNTER — OFFICE VISIT (OUTPATIENT)
Dept: CARDIOLOGY CLINIC | Age: 58
End: 2023-07-05
Payer: COMMERCIAL

## 2023-07-05 VITALS
SYSTOLIC BLOOD PRESSURE: 130 MMHG | DIASTOLIC BLOOD PRESSURE: 86 MMHG | BODY MASS INDEX: 52.2 KG/M2 | OXYGEN SATURATION: 95 % | WEIGHT: 293 LBS | HEART RATE: 71 BPM

## 2023-07-05 DIAGNOSIS — Z00.00 PE (PHYSICAL EXAM), ANNUAL: Primary | ICD-10-CM

## 2023-07-05 DIAGNOSIS — I48.0 PAF (PAROXYSMAL ATRIAL FIBRILLATION) (HCC): ICD-10-CM

## 2023-07-05 DIAGNOSIS — E78.5 DYSLIPIDEMIA: ICD-10-CM

## 2023-07-05 DIAGNOSIS — R06.09 DOE (DYSPNEA ON EXERTION): ICD-10-CM

## 2023-07-05 DIAGNOSIS — I82.533 CHRONIC DEEP VEIN THROMBOSIS (DVT) OF POPLITEAL VEIN OF BOTH LOWER EXTREMITIES (HCC): ICD-10-CM

## 2023-07-05 DIAGNOSIS — I25.10 CORONARY ARTERY DISEASE INVOLVING NATIVE CORONARY ARTERY OF NATIVE HEART WITHOUT ANGINA PECTORIS: ICD-10-CM

## 2023-07-05 DIAGNOSIS — I26.02 SADDLE EMBOLUS OF PULMONARY ARTERY WITH ACUTE COR PULMONALE, UNSPECIFIED CHRONICITY (HCC): ICD-10-CM

## 2023-07-05 DIAGNOSIS — Z78.9 WEIGHT LOSS ADVISED: Primary | ICD-10-CM

## 2023-07-05 DIAGNOSIS — I10 ESSENTIAL HYPERTENSION: ICD-10-CM

## 2023-07-05 PROCEDURE — 93000 ELECTROCARDIOGRAM COMPLETE: CPT | Performed by: INTERNAL MEDICINE

## 2023-07-05 PROCEDURE — 1036F TOBACCO NON-USER: CPT | Performed by: INTERNAL MEDICINE

## 2023-07-05 PROCEDURE — G8417 CALC BMI ABV UP PARAM F/U: HCPCS | Performed by: INTERNAL MEDICINE

## 2023-07-05 PROCEDURE — 3079F DIAST BP 80-89 MM HG: CPT | Performed by: INTERNAL MEDICINE

## 2023-07-05 PROCEDURE — 99214 OFFICE O/P EST MOD 30 MIN: CPT | Performed by: INTERNAL MEDICINE

## 2023-07-05 PROCEDURE — 3075F SYST BP GE 130 - 139MM HG: CPT | Performed by: INTERNAL MEDICINE

## 2023-07-05 PROCEDURE — G8427 DOCREV CUR MEDS BY ELIG CLIN: HCPCS | Performed by: INTERNAL MEDICINE

## 2023-07-05 PROCEDURE — 3017F COLORECTAL CA SCREEN DOC REV: CPT | Performed by: INTERNAL MEDICINE

## 2023-07-05 ASSESSMENT — ENCOUNTER SYMPTOMS
NAUSEA: 0
WHEEZING: 0
COUGH: 0
STRIDOR: 0
BLOOD IN STOOL: 0
SHORTNESS OF BREATH: 1
GASTROINTESTINAL NEGATIVE: 1
CHEST TIGHTNESS: 0
EYES NEGATIVE: 1

## 2023-07-05 NOTE — PROGRESS NOTES
OFFICE VISIT         Patient: Kaz Bull  YOB: 1965  MRN: [id number]    Chief Complaint: DVT PE AF BECKWITH   Chief Complaint   Patient presents with    Follow-up     4 month    Hypertension       CV Data:  11/2020 Unprovoked B/L DVT and Saddle Embolism   Thyroid cancer s/p Thyroidectomy   4/21 SPECT abn anterior   5/12/21 Cath LAD - Aneurysmal dilation with moderate sequential lesions 50-60 and  IFR negative. EF 60       Subjective/HPI pt is SOB with any ADLs. No cp currently. Compliant with all meds. No bleed. No falls. haad AF since PE. Was seeing Dr. Joshua Cuellar but changing. There were plans for CVN and Antiarrhythmic    3/23/21 still winded with ADLs. No pain. Went back into AF. No cp tired. 5/3/21 still BECKWITH no cp no bleed. No falls takes meds    6/23/21 still Beckwith no cp takes meds. No falls no bleed. Having pain with heel spurrs. 10/20/21 DOING WELL NO CP NO SOB No falls no bleed takes meds. Discomfort from heel spurs. 2/23/22 doing well no cp no sob she can not sense AF. No bleed. Takes meds. 6/29/22 still BECKWITH no cp gaining weight no cp \    10/31/22 doing well no cp no sob no palps now nop fa;lls nmo bleed. Take smeds. 3/1/23 since lowering synthroid feels much better and less palps. No cp no sob no falls no bleed.      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

## 2023-07-17 DIAGNOSIS — E03.9 HYPOTHYROIDISM, UNSPECIFIED TYPE: ICD-10-CM

## 2023-07-17 DIAGNOSIS — C73 PAPILLARY THYROID CARCINOMA (HCC): ICD-10-CM

## 2023-07-17 LAB
CHOLEST SERPL-MCNC: 119 MG/DL (ref 0–199)
ERYTHROCYTE [DISTWIDTH] IN BLOOD BY AUTOMATED COUNT: 14.3 % (ref 11.5–14.5)
HCT VFR BLD AUTO: 48 % (ref 37–47)
HDLC SERPL-MCNC: 44 MG/DL (ref 40–59)
HGB BLD-MCNC: 16 G/DL (ref 12–16)
LDLC SERPL CALC-MCNC: 46 MG/DL (ref 0–129)
MCH RBC QN AUTO: 32.3 PG (ref 27–31.3)
MCHC RBC AUTO-ENTMCNC: 33.3 % (ref 33–37)
MCV RBC AUTO: 97 FL (ref 79.4–94.8)
PLATELET # BLD AUTO: 235 K/UL (ref 130–400)
RBC # BLD AUTO: 4.95 M/UL (ref 4.2–5.4)
T4 FREE SERPL-MCNC: 1.49 NG/DL (ref 0.84–1.68)
TRIGL SERPL-MCNC: 144 MG/DL (ref 0–150)
TSH REFLEX: 2.83 UIU/ML (ref 0.44–3.86)
WBC # BLD AUTO: 8.3 K/UL (ref 4.8–10.8)

## 2023-07-19 ENCOUNTER — OFFICE VISIT (OUTPATIENT)
Dept: ENDOCRINOLOGY | Age: 58
End: 2023-07-19
Payer: COMMERCIAL

## 2023-07-19 VITALS
HEIGHT: 66 IN | DIASTOLIC BLOOD PRESSURE: 77 MMHG | SYSTOLIC BLOOD PRESSURE: 112 MMHG | BODY MASS INDEX: 47.09 KG/M2 | HEART RATE: 81 BPM | WEIGHT: 293 LBS | OXYGEN SATURATION: 95 %

## 2023-07-19 DIAGNOSIS — R73.03 PREDIABETES: ICD-10-CM

## 2023-07-19 DIAGNOSIS — C73 PAPILLARY THYROID CARCINOMA (HCC): ICD-10-CM

## 2023-07-19 DIAGNOSIS — E03.9 HYPOTHYROIDISM, UNSPECIFIED TYPE: Primary | ICD-10-CM

## 2023-07-19 DIAGNOSIS — E28.2 PCOS (POLYCYSTIC OVARIAN SYNDROME): ICD-10-CM

## 2023-07-19 LAB
THYROGLOB AB SERPL-ACNC: <0.9 IU/ML (ref 0–4)
THYROGLOB SERPL-MCNC: 0.1 NG/ML (ref 1.3–31.8)
THYROGLOB SERPL-MCNC: ABNORMAL NG/ML (ref 1.3–31.8)

## 2023-07-19 PROCEDURE — 99213 OFFICE O/P EST LOW 20 MIN: CPT | Performed by: INTERNAL MEDICINE

## 2023-07-19 PROCEDURE — 3017F COLORECTAL CA SCREEN DOC REV: CPT | Performed by: INTERNAL MEDICINE

## 2023-07-19 PROCEDURE — 1036F TOBACCO NON-USER: CPT | Performed by: INTERNAL MEDICINE

## 2023-07-19 PROCEDURE — G8417 CALC BMI ABV UP PARAM F/U: HCPCS | Performed by: INTERNAL MEDICINE

## 2023-07-19 PROCEDURE — G8427 DOCREV CUR MEDS BY ELIG CLIN: HCPCS | Performed by: INTERNAL MEDICINE

## 2023-07-19 RX ORDER — LEVOTHYROXINE SODIUM 175 UG/1
TABLET ORAL
Qty: 90 TABLET | Refills: 3 | Status: SHIPPED | OUTPATIENT
Start: 2023-07-19

## 2023-07-19 NOTE — PROGRESS NOTES
no recent labs to review patient look at bariatric surgery but not covered through insurance wants to try Ozempic but questions to try metformin BMI 51    Thyroglobulin levels have been stable undetectable    Thyroid Problem  Presents for follow-up visit. Patient reports no cold intolerance or heat intolerance. The symptoms have been stable. Other  This is a recurrent (Prediabetes) problem. The current episode started more than 1 year ago. The problem has been waxing and waning. The symptoms are aggravated by eating. She has tried nothing for the symptoms.       Latest Reference Range & Units 21 10:21 22 11:05 23 14:59   THYROGLOBULIN, SERUM OR PLASMA 1.3 - 31.8 ng/mL 0.1 (L) 0.1 (L) 0.1 (L)   (L): Data is abnormally low      Past Medical History:   Diagnosis Date    Atrial fibrillation (HCC)     Hypertension     PE (pulmonary thromboembolism) ([address]) 2020    Ventricular tachycardia (HCC)      Past Surgical History:   Procedure Laterality Date    APPENDECTOMY      CARDIOVERSION  2021    CHOLECYSTECTOMY      COLONOSCOPY N/A 2021    COLONOSCOPY DIAGNOSTIC WITH POLYPECTOMY performed by Catrachita Posada MD at [address]      TUBAL LIGATION       Social History     Socioeconomic History    Marital status:      Spouse name: Not on file    Number of children: Not on file    Years of education: Not on file    Highest education level: Not on file   Occupational History    Not on file   Tobacco Use    Smoking status: Former     Packs/day: 1.50     Years: 35.00     Pack years: 52.50     Types: Cigarettes     Start date: 47     Quit date:      Years since quittin.5    Smokeless tobacco: Never   Vaping Use    Vaping Use: Never used   Substance and Sexual Activity    Alcohol use: Not Currently    Drug use: Never    Sexual activity: Not on file   Other Topics Concern    Not on file   Social

## 2023-07-20 LAB — THYROGLOB IGG SER-ACNC: <12 IU/ML (ref 0–40)

## 2023-07-29 DIAGNOSIS — G62.9 PERIPHERAL POLYNEUROPATHY: ICD-10-CM

## 2023-07-31 RX ORDER — GABAPENTIN 100 MG/1
CAPSULE ORAL
Qty: 30 CAPSULE | Refills: 3 | Status: SHIPPED | OUTPATIENT
Start: 2023-07-31 | End: 2023-08-30

## 2023-09-20 ENCOUNTER — OFFICE VISIT (OUTPATIENT)
Dept: PULMONOLOGY | Age: 58
End: 2023-09-20
Payer: COMMERCIAL

## 2023-09-20 VITALS
OXYGEN SATURATION: 96 % | DIASTOLIC BLOOD PRESSURE: 64 MMHG | TEMPERATURE: 96.9 F | HEART RATE: 79 BPM | SYSTOLIC BLOOD PRESSURE: 126 MMHG | WEIGHT: 293 LBS | BODY MASS INDEX: 52.13 KG/M2

## 2023-09-20 DIAGNOSIS — E66.01 MORBID OBESITY (HCC): ICD-10-CM

## 2023-09-20 DIAGNOSIS — I27.82 CHRONIC SADDLE PULMONARY EMBOLISM WITHOUT ACUTE COR PULMONALE (HCC): ICD-10-CM

## 2023-09-20 DIAGNOSIS — G47.33 OSA (OBSTRUCTIVE SLEEP APNEA): Primary | ICD-10-CM

## 2023-09-20 DIAGNOSIS — I26.92 CHRONIC SADDLE PULMONARY EMBOLISM WITHOUT ACUTE COR PULMONALE (HCC): ICD-10-CM

## 2023-09-20 PROCEDURE — G8427 DOCREV CUR MEDS BY ELIG CLIN: HCPCS | Performed by: INTERNAL MEDICINE

## 2023-09-20 PROCEDURE — G8417 CALC BMI ABV UP PARAM F/U: HCPCS | Performed by: INTERNAL MEDICINE

## 2023-09-20 PROCEDURE — 3017F COLORECTAL CA SCREEN DOC REV: CPT | Performed by: INTERNAL MEDICINE

## 2023-09-20 PROCEDURE — 99214 OFFICE O/P EST MOD 30 MIN: CPT | Performed by: INTERNAL MEDICINE

## 2023-09-20 PROCEDURE — 1036F TOBACCO NON-USER: CPT | Performed by: INTERNAL MEDICINE

## 2023-09-20 NOTE — PROGRESS NOTES
Subjective:     Jordy Ordaz is a 63 y.o. female who complains today of:     Chief Complaint   Patient presents with    Follow-up     4m f/u on ALEYDA       HPI  She is using CPAP with 10 centimeters of H2O with heated humidity. She is using CPAP for about  5-6 hours every night. She is using CPAP with  Full face  Mask. She said  sleep is restful with the CPAP use. She is compliant with CPAP therapy and benefiting with CPAP use. No snoring with CPAP use. No complaint of daytime sleepiness or tiredness with CPAP use. She denies taking naps. No sleepiness with driving. She denies difficulty falling asleep or staying asleep. No need for CPAP Supply      I reviewed compliance report with patient regarding CPAP therapy. She is using  CPAP for 30 days out of 30 days. Average usage of days used is 5 hours and 24 min , average AHI 3.9 with CPAP use.      Allergies:  Lisinopril  Past Medical History:   Diagnosis Date    Atrial fibrillation ([address])     Hypertension     PE (pulmonary thromboembolism) ([address]) 11/2020    Ventricular tachycardia (HCC)      Past Surgical History:   Procedure Laterality Date    APPENDECTOMY      CARDIOVERSION  03/16/2021    CHOLECYSTECTOMY      COLONOSCOPY N/A 07/21/2021    COLONOSCOPY DIAGNOSTIC WITH POLYPECTOMY performed by Juan Parra MD at [address] [facility] AND ADENOIDECTOMY      TUBAL LIGATION       Family History   Problem Relation Age of Onset    Stroke Mother         CVA    Diabetes Mother     Heart Disease Father     Heart Failure Sister     Colon Cancer Neg Hx     Breast Cancer Neg Hx      Social History     Socioeconomic History    Marital status:      Spouse name: Not on file    Number of children: Not on file    Years of education: Not on file    Highest education level: Not on file   Occupational History    Not on file   Tobacco Use    Smoking status: Former     Packs/day: 1.50     Years: 35.00

## 2023-09-28 DIAGNOSIS — I10 ESSENTIAL HYPERTENSION: ICD-10-CM

## 2023-09-28 RX ORDER — METOPROLOL SUCCINATE 100 MG/1
TABLET, EXTENDED RELEASE ORAL
Qty: 270 TABLET | Refills: 2 | Status: SHIPPED | OUTPATIENT
Start: 2023-09-28

## 2023-09-28 NOTE — TELEPHONE ENCOUNTER
Requesting medication refill. Rx requested:  Requested Prescriptions     Pending Prescriptions Disp Refills    metoprolol succinate (TOPROL XL) 100 MG extended release tablet [Pharmacy Med Name: metoprolol succinate  mg tablet,extended release 24 hr] 270 tablet 2     Sig: TAKE 1 & 1/2 (ONE AND ONE-HALF) TABLETS TWICE DAILY         Last Office Visit:   7/5/2023      Next Visit Date:  Future Appointments   Date Time Provider [address]   10/3/2023  1:30 PM [address] [place] Robert Ashby MD [address]   10/25/2023  1:15 PM Billy Youssef MD [address] [place] [address]   11/1/2023 12:00 PM Fanta Tam MD [address] [facility]   1/24/2024  1:15 PM Mandi Jon MD [facility]               Last refill 01/03/2023.

## 2023-10-01 SDOH — ECONOMIC STABILITY: FOOD INSECURITY: WITHIN THE PAST 12 MONTHS, THE FOOD YOU BOUGHT JUST DIDN'T LAST AND YOU DIDN'T HAVE MONEY TO GET MORE.: NEVER TRUE

## 2023-10-01 SDOH — ECONOMIC STABILITY: HOUSING INSECURITY
IN THE LAST 12 MONTHS, WAS THERE A TIME WHEN YOU DID NOT HAVE A STEADY PLACE TO SLEEP OR SLEPT IN A SHELTER (INCLUDING NOW)?: NO

## 2023-10-01 SDOH — ECONOMIC STABILITY: TRANSPORTATION INSECURITY
IN THE PAST 12 MONTHS, HAS LACK OF TRANSPORTATION KEPT YOU FROM MEETINGS, WORK, OR FROM GETTING THINGS NEEDED FOR DAILY LIVING?: NO

## 2023-10-01 SDOH — ECONOMIC STABILITY: INCOME INSECURITY: HOW HARD IS IT FOR YOU TO PAY FOR THE VERY BASICS LIKE FOOD, HOUSING, MEDICAL CARE, AND HEATING?: NOT HARD AT ALL

## 2023-10-01 SDOH — ECONOMIC STABILITY: FOOD INSECURITY: WITHIN THE PAST 12 MONTHS, YOU WORRIED THAT YOUR FOOD WOULD RUN OUT BEFORE YOU GOT MONEY TO BUY MORE.: NEVER TRUE

## 2023-10-01 ASSESSMENT — PATIENT HEALTH QUESTIONNAIRE - PHQ9
2. FEELING DOWN, DEPRESSED OR HOPELESS: 0
SUM OF ALL RESPONSES TO PHQ QUESTIONS 1-9: 0
SUM OF ALL RESPONSES TO PHQ9 QUESTIONS 1 & 2: 0
2. FEELING DOWN, DEPRESSED OR HOPELESS: NOT AT ALL
1. LITTLE INTEREST OR PLEASURE IN DOING THINGS: 0
SUM OF ALL RESPONSES TO PHQ QUESTIONS 1-9: 0
SUM OF ALL RESPONSES TO PHQ9 QUESTIONS 1 & 2: 0
1. LITTLE INTEREST OR PLEASURE IN DOING THINGS: NOT AT ALL

## 2023-10-03 ENCOUNTER — HOSPITAL ENCOUNTER (OUTPATIENT)
Dept: GENERAL RADIOLOGY | Age: 58
Discharge: HOME | End: 2023-10-05
Attending: FAMILY MEDICINE
Payer: COMMERCIAL

## 2023-10-03 ENCOUNTER — OFFICE VISIT (OUTPATIENT)
Dept: FAMILY MEDICINE CLINIC | Age: 58
End: 2023-10-03
Payer: COMMERCIAL

## 2023-10-03 ENCOUNTER — HOSPITAL ENCOUNTER (OUTPATIENT)
Dept: ULTRASOUND IMAGING | Age: 58
Discharge: HOME | End: 2023-10-05
Attending: FAMILY MEDICINE
Payer: COMMERCIAL

## 2023-10-03 VITALS
BODY MASS INDEX: 52.29 KG/M2 | WEIGHT: 293 LBS | TEMPERATURE: 97.3 F | SYSTOLIC BLOOD PRESSURE: 130 MMHG | OXYGEN SATURATION: 99 % | DIASTOLIC BLOOD PRESSURE: 86 MMHG | HEART RATE: 94 BPM

## 2023-10-03 DIAGNOSIS — M79.662 PAIN AND SWELLING OF LEFT LOWER LEG: Primary | ICD-10-CM

## 2023-10-03 DIAGNOSIS — M25.562 ACUTE PAIN OF LEFT KNEE: ICD-10-CM

## 2023-10-03 DIAGNOSIS — M79.89 PAIN AND SWELLING OF LEFT LOWER LEG: Primary | ICD-10-CM

## 2023-10-03 DIAGNOSIS — M79.89 PAIN AND SWELLING OF LEFT LOWER LEG: ICD-10-CM

## 2023-10-03 DIAGNOSIS — M79.662 PAIN AND SWELLING OF LEFT LOWER LEG: ICD-10-CM

## 2023-10-03 PROCEDURE — 99214 OFFICE O/P EST MOD 30 MIN: CPT | Performed by: FAMILY MEDICINE

## 2023-10-03 PROCEDURE — 93971 EXTREMITY STUDY: CPT

## 2023-10-03 PROCEDURE — 73562 X-RAY EXAM OF KNEE 3: CPT

## 2023-10-03 PROCEDURE — G8427 DOCREV CUR MEDS BY ELIG CLIN: HCPCS | Performed by: FAMILY MEDICINE

## 2023-10-03 PROCEDURE — 1036F TOBACCO NON-USER: CPT | Performed by: FAMILY MEDICINE

## 2023-10-03 PROCEDURE — G8484 FLU IMMUNIZE NO ADMIN: HCPCS | Performed by: FAMILY MEDICINE

## 2023-10-03 PROCEDURE — 3017F COLORECTAL CA SCREEN DOC REV: CPT | Performed by: FAMILY MEDICINE

## 2023-10-03 PROCEDURE — G8417 CALC BMI ABV UP PARAM F/U: HCPCS | Performed by: FAMILY MEDICINE

## 2023-10-03 RX ORDER — HYDROCODONE BITARTRATE AND ACETAMINOPHEN 5; 325 MG/1; MG/1
1 TABLET ORAL EVERY 8 HOURS PRN
Qty: 10 TABLET | Refills: 0 | Status: SHIPPED | OUTPATIENT
Start: 2023-10-03 | End: 2023-10-06

## 2023-10-03 NOTE — PROGRESS NOTES
Subjective:      Patient ID: Uday Melara is a 63 y.o. female who presents today for:     Chief Complaint   Patient presents with    Leg Pain     X1 month, L leg, knee b/l edema last few days        HPI  Uday Melara is a very pleasant 81-year-old female presents today to follow-up. She has been experiencing pain in her left leg as well as her left knee for approximately 1 month. She has noticed some mild swelling in that leg she denies any chest pain or shortness of breath. She is on Eliquis due to paroxysmal atrial fibrillation.   She states that pain is localized to the left knee and is exacerbated by prolonged standing or walking  Past Medical History:   Diagnosis Date    Atrial fibrillation ([address])     Hypertension     PE (pulmonary thromboembolism) ([address]) 2020    Ventricular tachycardia ([address])      Past Surgical History:   Procedure Laterality Date    APPENDECTOMY      CARDIOVERSION  2021    CHOLECYSTECTOMY      COLONOSCOPY N/A 2021    COLONOSCOPY DIAGNOSTIC WITH POLYPECTOMY performed by Dominique Forbes MD at [facility]       Family History   Problem Relation Age of Onset    Stroke Mother         CVA    Diabetes Mother     Heart Disease Father     Heart Failure Sister     Colon Cancer Neg Hx     Breast Cancer Neg Hx      Social History     Socioeconomic History    Marital status:      Spouse name: Not on file    Number of children: Not on file    Years of education: Not on file    Highest education level: Not on file   Occupational History    Not on file   Tobacco Use    Smoking status: Former     Packs/day: 1.50     Years: 35.00     Additional pack years: 0.00     Total pack years: 52.50     Types: Cigarettes     Start date: 47     Quit date:      Years since quittin.7    Smokeless tobacco: Never   Vaping Use    Vaping Use: Never used   Substance and Sexual Activity

## 2023-10-06 ENCOUNTER — OFFICE VISIT (OUTPATIENT)
Dept: ORTHOPEDIC SURGERY | Age: 58
End: 2023-10-06

## 2023-10-06 VITALS
HEIGHT: 66 IN | HEART RATE: 83 BPM | WEIGHT: 293 LBS | OXYGEN SATURATION: 98 % | BODY MASS INDEX: 47.09 KG/M2 | TEMPERATURE: 98.7 F

## 2023-10-06 DIAGNOSIS — M17.12 PRIMARY OSTEOARTHRITIS OF LEFT KNEE: Primary | ICD-10-CM

## 2023-10-06 RX ORDER — TRIAMCINOLONE ACETONIDE 40 MG/ML
80 INJECTION, SUSPENSION INTRA-ARTICULAR; INTRAMUSCULAR ONCE
Status: COMPLETED | OUTPATIENT
Start: 2023-10-06 | End: 2023-10-06

## 2023-10-06 RX ORDER — LIDOCAINE HYDROCHLORIDE 10 MG/ML
8 INJECTION, SOLUTION INFILTRATION; PERINEURAL ONCE
Status: COMPLETED | OUTPATIENT
Start: 2023-10-06 | End: 2023-10-06

## 2023-10-06 RX ADMIN — LIDOCAINE HYDROCHLORIDE 8 ML: 10 INJECTION, SOLUTION INFILTRATION; PERINEURAL at 14:41

## 2023-10-06 RX ADMIN — TRIAMCINOLONE ACETONIDE 80 MG: 40 INJECTION, SUSPENSION INTRA-ARTICULAR; INTRAMUSCULAR at 14:40

## 2023-10-06 ASSESSMENT — ENCOUNTER SYMPTOMS
EYE DISCHARGE: 0
SHORTNESS OF BREATH: 0
COUGH: 0
DIARRHEA: 0
CONSTIPATION: 0
EYE PAIN: 0
ABDOMINAL PAIN: 0
EYE ITCHING: 0

## 2023-10-06 NOTE — PROGRESS NOTES
This is a consult from Kirsten Rios MD for evaluation of the patient's left knee pain. Patient ID:  Eldred Collet is a 63 y.o. female who presents today for evaluation of left knee pain.     Injury: no  Metal Allergy: no    Location: left knee pain, located on the medial aspect of the knee  Pain: yes; 8 on a scale of 1 to 10  Onset: sudden  Duration: 1 months  Frequency:  occurs daily  Quality: aching and boring   Swelling: patient notes intermittent swelling of the joint  Aggravating factors: weight bearing activity, standing, and walking  Alleviating factors: removing weight from leg and rest  Mechanical symptoms: none  Radiation: no    Activities: walking independently  Restriction:  decreased ambulatory tolerance  Progression:  worsening    Previous treatment:  none  NSAIDs:  intolerant of NSAIDs  PT:  none    Medications:    Current Outpatient Medications on File Prior to Visit   Medication Sig Dispense Refill    metoprolol succinate (TOPROL XL) 100 MG extended release tablet TAKE 1 & 1/2 (ONE AND ONE-HALF) TABLETS TWICE DAILY 270 tablet 2    levothyroxine (SYNTHROID) 175 MCG tablet 1 po daily 90 tablet 3    metFORMIN (GLUCOPHAGE) 500 MG tablet Take 1 tablet by mouth 2 times daily (with meals) 180 tablet 1    ELIQUIS 5 MG TABS tablet Take 1 tablet by mouth in the morning and at bedtime 180 tablet 3    atorvastatin (LIPITOR) 40 MG tablet Take 1 tablet by mouth daily 90 tablet 3    hydroCHLOROthiazide (HYDRODIURIL) 12.5 MG tablet Take 1 tablet by mouth daily 90 tablet 3    vitamin B-6 (PYRIDOXINE) 100 MG tablet daily      CPAP Machine MISC by Does not apply route New CPAP with 10 cm of H2O 1 each 0    losartan (COZAAR) 25 MG tablet TAKE 1 TABLET BY MOUTH EVERY DAY      aspirin 81 MG EC tablet Take 1 tablet by mouth daily      Cyanocobalamin (B-12) 1000 MCG SUBL Place under the tongue      folic acid (FOLVITE) 778 MCG tablet Take 1 tablet by mouth daily      gabapentin (NEURONTIN) 100 MG capsule TAKE 1

## 2023-10-08 ASSESSMENT — ENCOUNTER SYMPTOMS
NAUSEA: 0
VOMITING: 0
BLOOD IN STOOL: 0
SHORTNESS OF BREATH: 0
COUGH: 0
ABDOMINAL PAIN: 0
CHEST TIGHTNESS: 0
CONSTIPATION: 0
APNEA: 0
DIARRHEA: 0

## 2023-11-22 ENCOUNTER — OFFICE VISIT (OUTPATIENT)
Dept: CARDIOLOGY CLINIC | Age: 58
End: 2023-11-22
Payer: COMMERCIAL

## 2023-11-22 VITALS
HEIGHT: 66 IN | OXYGEN SATURATION: 94 % | HEART RATE: 82 BPM | SYSTOLIC BLOOD PRESSURE: 130 MMHG | WEIGHT: 293 LBS | DIASTOLIC BLOOD PRESSURE: 82 MMHG | BODY MASS INDEX: 47.09 KG/M2

## 2023-11-22 DIAGNOSIS — E78.5 DYSLIPIDEMIA: ICD-10-CM

## 2023-11-22 DIAGNOSIS — I25.10 CORONARY ARTERY DISEASE INVOLVING NATIVE CORONARY ARTERY OF NATIVE HEART WITHOUT ANGINA PECTORIS: ICD-10-CM

## 2023-11-22 DIAGNOSIS — I10 ESSENTIAL HYPERTENSION: Primary | ICD-10-CM

## 2023-11-22 DIAGNOSIS — I48.0 PAF (PAROXYSMAL ATRIAL FIBRILLATION) (HCC): ICD-10-CM

## 2023-11-22 DIAGNOSIS — I26.02 SADDLE EMBOLUS OF PULMONARY ARTERY WITH ACUTE COR PULMONALE, UNSPECIFIED CHRONICITY (HCC): ICD-10-CM

## 2023-11-22 DIAGNOSIS — I82.533 CHRONIC DEEP VEIN THROMBOSIS (DVT) OF POPLITEAL VEIN OF BOTH LOWER EXTREMITIES (HCC): ICD-10-CM

## 2023-11-22 DIAGNOSIS — R06.09 DOE (DYSPNEA ON EXERTION): ICD-10-CM

## 2023-11-22 DIAGNOSIS — I48.91 ATRIAL FIBRILLATION, UNSPECIFIED TYPE (HCC): ICD-10-CM

## 2023-11-22 PROCEDURE — 3017F COLORECTAL CA SCREEN DOC REV: CPT | Performed by: INTERNAL MEDICINE

## 2023-11-22 PROCEDURE — G8484 FLU IMMUNIZE NO ADMIN: HCPCS | Performed by: INTERNAL MEDICINE

## 2023-11-22 PROCEDURE — G8427 DOCREV CUR MEDS BY ELIG CLIN: HCPCS | Performed by: INTERNAL MEDICINE

## 2023-11-22 PROCEDURE — 3079F DIAST BP 80-89 MM HG: CPT | Performed by: INTERNAL MEDICINE

## 2023-11-22 PROCEDURE — G8417 CALC BMI ABV UP PARAM F/U: HCPCS | Performed by: INTERNAL MEDICINE

## 2023-11-22 PROCEDURE — 1036F TOBACCO NON-USER: CPT | Performed by: INTERNAL MEDICINE

## 2023-11-22 PROCEDURE — 99214 OFFICE O/P EST MOD 30 MIN: CPT | Performed by: INTERNAL MEDICINE

## 2023-11-22 PROCEDURE — 3075F SYST BP GE 130 - 139MM HG: CPT | Performed by: INTERNAL MEDICINE

## 2023-11-22 ASSESSMENT — ENCOUNTER SYMPTOMS
CHEST TIGHTNESS: 0
COUGH: 0
NAUSEA: 0
BLOOD IN STOOL: 0
GASTROINTESTINAL NEGATIVE: 1
WHEEZING: 0
SHORTNESS OF BREATH: 1
STRIDOR: 0
EYES NEGATIVE: 1

## 2023-11-22 NOTE — PROGRESS NOTES
BILITOT 0.9 10/27/2021 10:57 AM    ALKPHOS 129 10/27/2021 10:57 AM    AST 29 10/27/2021 10:57 AM    ALT 31 10/27/2021 10:57 AM     BMP:    Lab Results   Component Value Date/Time     03/01/2023 02:59 PM    K 3.8 03/01/2023 02:59 PM     03/01/2023 02:59 PM    CO2 29 03/01/2023 02:59 PM    BUN 20 03/01/2023 02:59 PM    LABALBU 3.8 02/23/2023 01:18 PM    CREATININE 1.18 03/01/2023 02:59 PM    CALCIUM 9.6 03/01/2023 02:59 PM    GFRAA 58.4 08/31/2022 03:59 PM    LABGLOM 53.9 03/01/2023 02:59 PM    GLUCOSE 89 03/01/2023 02:59 PM     Magnesium:    Lab Results   Component Value Date/Time    MG 2.1 03/10/2021 09:47 AM     TSH:  Lab Results   Component Value Date    TSH 0.246 (L) 08/31/2022             Patient Active Problem List   Diagnosis    Postoperative hypothyroidism    Papillary thyroid carcinoma (HCC)    PAF (paroxysmal atrial fibrillation) (HCC)    Abnormal stress test    Polyp of colon    ALEYDA (obstructive sleep apnea)       There are no discontinued medications. Modified Medications    No medications on file       No orders of the defined types were placed in this encounter. Assessment/Plan:    1. Essential hypertension   stable - continue meds. Low salt diet     2. Atrial fibrillation, unspecified type ([address])   NOAC. - rate control     3. 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 Chronic deep vein thrombosis (DVT) of popliteal vein of both lower extremities (HCC)      5. TONG (dyspnea on exertion) -  Stable     6. CAD - mod LAD. IFR negative. Add Statin. - continue cv meds. 7.  ALEYDA - continue CPAP      Loose weight. Counseling:  Heart Healthy Lifestyle, Improve BMI, Low Salt Diet, Take Precautions to Prevent Falls and Walk Daily    Return in about 6 months (around 5/22/2024).     Electronically signed by Chaim Garvin MD on 11/22/2023 at 4:00 PM

## 2023-11-25 DIAGNOSIS — G62.9 PERIPHERAL POLYNEUROPATHY: ICD-10-CM

## 2023-11-27 RX ORDER — GABAPENTIN 100 MG/1
CAPSULE ORAL
Qty: 30 CAPSULE | Refills: 3 | Status: SHIPPED | OUTPATIENT
Start: 2023-11-27 | End: 2023-12-27

## 2023-12-12 DIAGNOSIS — E03.9 HYPOTHYROIDISM, UNSPECIFIED TYPE: ICD-10-CM

## 2023-12-12 DIAGNOSIS — R73.03 PREDIABETES: ICD-10-CM

## 2023-12-12 LAB
ANION GAP SERPL CALCULATED.3IONS-SCNC: 7 MEQ/L (ref 9–15)
BUN SERPL-MCNC: 17 MG/DL (ref 6–20)
CALCIUM SERPL-MCNC: 9.1 MG/DL (ref 8.5–9.9)
CHLORIDE SERPL-SCNC: 103 MEQ/L (ref 95–107)
CO2 SERPL-SCNC: 30 MEQ/L (ref 20–31)
CREAT SERPL-MCNC: 1 MG/DL (ref 0.5–0.9)
GLUCOSE SERPL-MCNC: 111 MG/DL (ref 70–99)
HBA1C MFR BLD: 6.4 % (ref 4.8–5.9)
POTASSIUM SERPL-SCNC: 3.6 MEQ/L (ref 3.4–4.9)
SODIUM SERPL-SCNC: 140 MEQ/L (ref 135–144)
T4 FREE SERPL-MCNC: 1.78 NG/DL (ref 0.84–1.68)
TSH REFLEX: 0.75 UIU/ML (ref 0.44–3.86)

## 2023-12-13 ENCOUNTER — OFFICE VISIT (OUTPATIENT)
Dept: ENDOCRINOLOGY | Age: 58
End: 2023-12-13
Payer: COMMERCIAL

## 2023-12-13 VITALS
BODY MASS INDEX: 47.09 KG/M2 | TEMPERATURE: 96.9 F | HEART RATE: 85 BPM | OXYGEN SATURATION: 98 % | SYSTOLIC BLOOD PRESSURE: 133 MMHG | WEIGHT: 293 LBS | DIASTOLIC BLOOD PRESSURE: 88 MMHG | HEIGHT: 66 IN

## 2023-12-13 DIAGNOSIS — E03.9 HYPOTHYROIDISM, UNSPECIFIED TYPE: ICD-10-CM

## 2023-12-13 DIAGNOSIS — E66.01 MORBID OBESITY (HCC): ICD-10-CM

## 2023-12-13 DIAGNOSIS — C73 PAPILLARY THYROID CARCINOMA (HCC): ICD-10-CM

## 2023-12-13 DIAGNOSIS — R73.03 PREDIABETES: Primary | ICD-10-CM

## 2023-12-13 LAB
CHP ED QC CHECK: NORMAL
GLUCOSE BLD-MCNC: NORMAL MG/DL

## 2023-12-13 PROCEDURE — G8417 CALC BMI ABV UP PARAM F/U: HCPCS | Performed by: INTERNAL MEDICINE

## 2023-12-13 PROCEDURE — 82962 GLUCOSE BLOOD TEST: CPT | Performed by: INTERNAL MEDICINE

## 2023-12-13 PROCEDURE — G8484 FLU IMMUNIZE NO ADMIN: HCPCS | Performed by: INTERNAL MEDICINE

## 2023-12-13 PROCEDURE — 99213 OFFICE O/P EST LOW 20 MIN: CPT | Performed by: INTERNAL MEDICINE

## 2023-12-13 PROCEDURE — 1036F TOBACCO NON-USER: CPT | Performed by: INTERNAL MEDICINE

## 2023-12-13 PROCEDURE — 3017F COLORECTAL CA SCREEN DOC REV: CPT | Performed by: INTERNAL MEDICINE

## 2023-12-13 PROCEDURE — G8427 DOCREV CUR MEDS BY ELIG CLIN: HCPCS | Performed by: INTERNAL MEDICINE

## 2023-12-13 RX ORDER — SEMAGLUTIDE 1.34 MG/ML
INJECTION, SOLUTION SUBCUTANEOUS
Qty: 3 ADJUSTABLE DOSE PRE-FILLED PEN SYRINGE | Refills: 3 | Status: SHIPPED | OUTPATIENT
Start: 2023-12-13

## 2023-12-14 LAB — THYROGLOB IGG SER-ACNC: <12 IU/ML (ref 0–40)

## 2023-12-19 LAB — THYROGLOB SERPL-MCNC: <0.5 NG/ML (ref 1.3–31.8)

## 2024-01-02 ENCOUNTER — APPOINTMENT (OUTPATIENT)
Dept: RADIOLOGY | Facility: HOSPITAL | Age: 59
DRG: 392 | End: 2024-01-02
Payer: COMMERCIAL

## 2024-01-02 ENCOUNTER — APPOINTMENT (OUTPATIENT)
Dept: CARDIOLOGY | Facility: HOSPITAL | Age: 59
DRG: 392 | End: 2024-01-02
Payer: COMMERCIAL

## 2024-01-02 ENCOUNTER — HOSPITAL ENCOUNTER (INPATIENT)
Facility: HOSPITAL | Age: 59
LOS: 5 days | Discharge: HOME | DRG: 392 | End: 2024-01-08
Attending: EMERGENCY MEDICINE | Admitting: STUDENT IN AN ORGANIZED HEALTH CARE EDUCATION/TRAINING PROGRAM
Payer: COMMERCIAL

## 2024-01-02 DIAGNOSIS — I48.91 ATRIAL FIBRILLATION WITH RVR (MULTI): ICD-10-CM

## 2024-01-02 DIAGNOSIS — K29.80 DUODENITIS: ICD-10-CM

## 2024-01-02 DIAGNOSIS — R93.0 ABNORMAL HEAD CT: ICD-10-CM

## 2024-01-02 DIAGNOSIS — R60.0 EDEMA OF LEFT LOWER EXTREMITY: ICD-10-CM

## 2024-01-02 DIAGNOSIS — E27.9 LESION OF ADRENAL GLAND (MULTI): ICD-10-CM

## 2024-01-02 DIAGNOSIS — R11.2 NAUSEA AND VOMITING, UNSPECIFIED VOMITING TYPE: Primary | ICD-10-CM

## 2024-01-02 DIAGNOSIS — I48.91 ATRIAL FIBRILLATION WITH RAPID VENTRICULAR RESPONSE (MULTI): ICD-10-CM

## 2024-01-02 LAB
ALBUMIN SERPL BCP-MCNC: 3.8 G/DL (ref 3.4–5)
ALP SERPL-CCNC: 95 U/L (ref 33–110)
ALT SERPL W P-5'-P-CCNC: 28 U/L (ref 7–45)
ANION GAP SERPL CALC-SCNC: 16 MMOL/L (ref 10–20)
AST SERPL W P-5'-P-CCNC: 29 U/L (ref 9–39)
BASOPHILS # BLD AUTO: 0.08 X10*3/UL (ref 0–0.1)
BASOPHILS NFR BLD AUTO: 0.4 %
BILIRUB SERPL-MCNC: 1.4 MG/DL (ref 0–1.2)
BNP SERPL-MCNC: 283 PG/ML (ref 0–99)
BUN SERPL-MCNC: 32 MG/DL (ref 6–23)
CALCIUM SERPL-MCNC: 9.8 MG/DL (ref 8.6–10.3)
CARDIAC TROPONIN I PNL SERPL HS: 9 NG/L (ref 0–13)
CARDIAC TROPONIN I PNL SERPL HS: 9 NG/L (ref 0–13)
CHLORIDE SERPL-SCNC: 98 MMOL/L (ref 98–107)
CO2 SERPL-SCNC: 28 MMOL/L (ref 21–32)
CREAT SERPL-MCNC: 1.63 MG/DL (ref 0.5–1.05)
EOSINOPHIL # BLD AUTO: 0.01 X10*3/UL (ref 0–0.7)
EOSINOPHIL NFR BLD AUTO: 0 %
ERYTHROCYTE [DISTWIDTH] IN BLOOD BY AUTOMATED COUNT: 13.8 % (ref 11.5–14.5)
FLUAV RNA RESP QL NAA+PROBE: NOT DETECTED
FLUBV RNA RESP QL NAA+PROBE: NOT DETECTED
GFR SERPL CREATININE-BSD FRML MDRD: 37 ML/MIN/1.73M*2
GLUCOSE SERPL-MCNC: 146 MG/DL (ref 74–99)
HCT VFR BLD AUTO: 48.4 % (ref 36–46)
HGB BLD-MCNC: 16.7 G/DL (ref 12–16)
IMM GRANULOCYTES # BLD AUTO: 0.13 X10*3/UL (ref 0–0.7)
IMM GRANULOCYTES NFR BLD AUTO: 0.6 % (ref 0–0.9)
INR PPP: 1.6 (ref 0.9–1.1)
LACTATE SERPL-SCNC: 2.1 MMOL/L (ref 0.4–2)
LACTATE SERPL-SCNC: 2.6 MMOL/L (ref 0.4–2)
LIPASE SERPL-CCNC: 49 U/L (ref 9–82)
LYMPHOCYTES # BLD AUTO: 0.88 X10*3/UL (ref 1.2–4.8)
LYMPHOCYTES NFR BLD AUTO: 4.3 %
MAGNESIUM SERPL-MCNC: 1.74 MG/DL (ref 1.6–2.4)
MCH RBC QN AUTO: 33.3 PG (ref 26–34)
MCHC RBC AUTO-ENTMCNC: 34.5 G/DL (ref 32–36)
MCV RBC AUTO: 96 FL (ref 80–100)
MONOCYTES # BLD AUTO: 1.05 X10*3/UL (ref 0.1–1)
MONOCYTES NFR BLD AUTO: 5.2 %
NEUTROPHILS # BLD AUTO: 18.19 X10*3/UL (ref 1.2–7.7)
NEUTROPHILS NFR BLD AUTO: 89.5 %
NRBC BLD-RTO: 0 /100 WBCS (ref 0–0)
PLATELET # BLD AUTO: 219 X10*3/UL (ref 150–450)
POTASSIUM SERPL-SCNC: 4 MMOL/L (ref 3.5–5.3)
PROT SERPL-MCNC: 7.6 G/DL (ref 6.4–8.2)
PROTHROMBIN TIME: 18.3 SECONDS (ref 9.8–12.8)
RBC # BLD AUTO: 5.02 X10*6/UL (ref 4–5.2)
SARS-COV-2 RNA RESP QL NAA+PROBE: DETECTED
SODIUM SERPL-SCNC: 138 MMOL/L (ref 136–145)
WBC # BLD AUTO: 20.3 X10*3/UL (ref 4.4–11.3)

## 2024-01-02 PROCEDURE — 87040 BLOOD CULTURE FOR BACTERIA: CPT | Mod: ELYLAB | Performed by: EMERGENCY MEDICINE

## 2024-01-02 PROCEDURE — 74177 CT ABD & PELVIS W/CONTRAST: CPT | Mod: FOREIGN READ | Performed by: RADIOLOGY

## 2024-01-02 PROCEDURE — 83735 ASSAY OF MAGNESIUM: CPT | Performed by: EMERGENCY MEDICINE

## 2024-01-02 PROCEDURE — 74177 CT ABD & PELVIS W/CONTRAST: CPT

## 2024-01-02 PROCEDURE — 93005 ELECTROCARDIOGRAM TRACING: CPT

## 2024-01-02 PROCEDURE — 99285 EMERGENCY DEPT VISIT HI MDM: CPT | Performed by: EMERGENCY MEDICINE

## 2024-01-02 PROCEDURE — 85025 COMPLETE CBC W/AUTO DIFF WBC: CPT | Performed by: EMERGENCY MEDICINE

## 2024-01-02 PROCEDURE — 96375 TX/PRO/DX INJ NEW DRUG ADDON: CPT

## 2024-01-02 PROCEDURE — 87636 SARSCOV2 & INF A&B AMP PRB: CPT | Performed by: EMERGENCY MEDICINE

## 2024-01-02 PROCEDURE — 2550000001 HC RX 255 CONTRASTS: Performed by: EMERGENCY MEDICINE

## 2024-01-02 PROCEDURE — 84484 ASSAY OF TROPONIN QUANT: CPT | Performed by: EMERGENCY MEDICINE

## 2024-01-02 PROCEDURE — 2500000004 HC RX 250 GENERAL PHARMACY W/ HCPCS (ALT 636 FOR OP/ED): Performed by: EMERGENCY MEDICINE

## 2024-01-02 PROCEDURE — 70450 CT HEAD/BRAIN W/O DYE: CPT | Performed by: STUDENT IN AN ORGANIZED HEALTH CARE EDUCATION/TRAINING PROGRAM

## 2024-01-02 PROCEDURE — 36415 COLL VENOUS BLD VENIPUNCTURE: CPT | Performed by: EMERGENCY MEDICINE

## 2024-01-02 PROCEDURE — 71045 X-RAY EXAM CHEST 1 VIEW: CPT | Performed by: STUDENT IN AN ORGANIZED HEALTH CARE EDUCATION/TRAINING PROGRAM

## 2024-01-02 PROCEDURE — 83690 ASSAY OF LIPASE: CPT | Performed by: EMERGENCY MEDICINE

## 2024-01-02 PROCEDURE — 83605 ASSAY OF LACTIC ACID: CPT | Performed by: EMERGENCY MEDICINE

## 2024-01-02 PROCEDURE — 70450 CT HEAD/BRAIN W/O DYE: CPT

## 2024-01-02 PROCEDURE — 96374 THER/PROPH/DIAG INJ IV PUSH: CPT

## 2024-01-02 PROCEDURE — 83880 ASSAY OF NATRIURETIC PEPTIDE: CPT | Performed by: EMERGENCY MEDICINE

## 2024-01-02 PROCEDURE — 85610 PROTHROMBIN TIME: CPT | Performed by: EMERGENCY MEDICINE

## 2024-01-02 PROCEDURE — 96361 HYDRATE IV INFUSION ADD-ON: CPT

## 2024-01-02 PROCEDURE — 71045 X-RAY EXAM CHEST 1 VIEW: CPT

## 2024-01-02 PROCEDURE — 80053 COMPREHEN METABOLIC PANEL: CPT | Performed by: EMERGENCY MEDICINE

## 2024-01-02 RX ORDER — ONDANSETRON HYDROCHLORIDE 2 MG/ML
4 INJECTION, SOLUTION INTRAVENOUS ONCE
Status: COMPLETED | OUTPATIENT
Start: 2024-01-02 | End: 2024-01-02

## 2024-01-02 RX ORDER — FAMOTIDINE 10 MG/ML
20 INJECTION INTRAVENOUS ONCE
Status: COMPLETED | OUTPATIENT
Start: 2024-01-02 | End: 2024-01-02

## 2024-01-02 RX ORDER — MORPHINE SULFATE 4 MG/ML
4 INJECTION, SOLUTION INTRAMUSCULAR; INTRAVENOUS ONCE
Status: COMPLETED | OUTPATIENT
Start: 2024-01-02 | End: 2024-01-02

## 2024-01-02 RX ADMIN — SODIUM CHLORIDE 500 ML: 9 INJECTION, SOLUTION INTRAVENOUS at 19:32

## 2024-01-02 RX ADMIN — SODIUM CHLORIDE 500 ML: 9 INJECTION, SOLUTION INTRAVENOUS at 21:55

## 2024-01-02 RX ADMIN — IOHEXOL 75 ML: 350 INJECTION, SOLUTION INTRAVENOUS at 21:20

## 2024-01-02 RX ADMIN — FAMOTIDINE 20 MG: 10 INJECTION, SOLUTION INTRAVENOUS at 19:32

## 2024-01-02 RX ADMIN — SODIUM CHLORIDE 500 ML: 9 INJECTION, SOLUTION INTRAVENOUS at 23:59

## 2024-01-02 RX ADMIN — ONDANSETRON 4 MG: 2 INJECTION INTRAMUSCULAR; INTRAVENOUS at 19:33

## 2024-01-02 RX ADMIN — MORPHINE SULFATE 4 MG: 4 INJECTION, SOLUTION INTRAMUSCULAR; INTRAVENOUS at 19:32

## 2024-01-02 ASSESSMENT — COLUMBIA-SUICIDE SEVERITY RATING SCALE - C-SSRS
1. IN THE PAST MONTH, HAVE YOU WISHED YOU WERE DEAD OR WISHED YOU COULD GO TO SLEEP AND NOT WAKE UP?: NO
2. HAVE YOU ACTUALLY HAD ANY THOUGHTS OF KILLING YOURSELF?: NO
6. HAVE YOU EVER DONE ANYTHING, STARTED TO DO ANYTHING, OR PREPARED TO DO ANYTHING TO END YOUR LIFE?: NO

## 2024-01-02 ASSESSMENT — PAIN DESCRIPTION - LOCATION: LOCATION: ABDOMEN

## 2024-01-02 ASSESSMENT — PAIN SCALES - GENERAL: PAINLEVEL_OUTOF10: 5 - MODERATE PAIN

## 2024-01-02 ASSESSMENT — PAIN - FUNCTIONAL ASSESSMENT: PAIN_FUNCTIONAL_ASSESSMENT: 0-10

## 2024-01-02 ASSESSMENT — PAIN DESCRIPTION - PAIN TYPE: TYPE: ACUTE PAIN

## 2024-01-03 ENCOUNTER — APPOINTMENT (OUTPATIENT)
Dept: RADIOLOGY | Facility: HOSPITAL | Age: 59
DRG: 392 | End: 2024-01-03
Payer: COMMERCIAL

## 2024-01-03 PROBLEM — R11.2 NAUSEA AND VOMITING, UNSPECIFIED VOMITING TYPE: Status: ACTIVE | Noted: 2024-01-03

## 2024-01-03 LAB
ANION GAP SERPL CALC-SCNC: 15 MMOL/L (ref 10–20)
ATRIAL RATE: 141 BPM
ATRIAL RATE: 94 BPM
BASOPHILS # BLD AUTO: 0.08 X10*3/UL (ref 0–0.1)
BASOPHILS NFR BLD AUTO: 0.4 %
BUN SERPL-MCNC: 31 MG/DL (ref 6–23)
CALCIUM SERPL-MCNC: 8.8 MG/DL (ref 8.6–10.3)
CHLORIDE SERPL-SCNC: 98 MMOL/L (ref 98–107)
CO2 SERPL-SCNC: 27 MMOL/L (ref 21–32)
CREAT SERPL-MCNC: 1.64 MG/DL (ref 0.5–1.05)
EOSINOPHIL # BLD AUTO: 0 X10*3/UL (ref 0–0.7)
EOSINOPHIL NFR BLD AUTO: 0 %
ERYTHROCYTE [DISTWIDTH] IN BLOOD BY AUTOMATED COUNT: 14.2 % (ref 11.5–14.5)
GFR SERPL CREATININE-BSD FRML MDRD: 36 ML/MIN/1.73M*2
GLUCOSE SERPL-MCNC: 146 MG/DL (ref 74–99)
HCT VFR BLD AUTO: 46.6 % (ref 36–46)
HGB BLD-MCNC: 15.7 G/DL (ref 12–16)
HOLD SPECIMEN: NORMAL
IMM GRANULOCYTES # BLD AUTO: 0.16 X10*3/UL (ref 0–0.7)
IMM GRANULOCYTES NFR BLD AUTO: 0.8 % (ref 0–0.9)
LACTATE SERPL-SCNC: 1.3 MMOL/L (ref 0.4–2)
LYMPHOCYTES # BLD AUTO: 1.07 X10*3/UL (ref 1.2–4.8)
LYMPHOCYTES NFR BLD AUTO: 5.2 %
MAGNESIUM SERPL-MCNC: 1.7 MG/DL (ref 1.6–2.4)
MCH RBC QN AUTO: 32.6 PG (ref 26–34)
MCHC RBC AUTO-ENTMCNC: 33.7 G/DL (ref 32–36)
MCV RBC AUTO: 97 FL (ref 80–100)
MONOCYTES # BLD AUTO: 0.97 X10*3/UL (ref 0.1–1)
MONOCYTES NFR BLD AUTO: 4.7 %
NEUTROPHILS # BLD AUTO: 18.3 X10*3/UL (ref 1.2–7.7)
NEUTROPHILS NFR BLD AUTO: 88.9 %
NRBC BLD-RTO: 0 /100 WBCS (ref 0–0)
PLATELET # BLD AUTO: 213 X10*3/UL (ref 150–450)
POTASSIUM SERPL-SCNC: 3.7 MMOL/L (ref 3.5–5.3)
PROCALCITONIN SERPL-MCNC: 4.37 NG/ML
Q ONSET: 209 MS
Q ONSET: 215 MS
QRS COUNT: 20 BEATS
QRS COUNT: 20 BEATS
QRS DURATION: 76 MS
QRS DURATION: 92 MS
QT INTERVAL: 314 MS
QT INTERVAL: 316 MS
QTC CALCULATION(BAZETT): 447 MS
QTC CALCULATION(BAZETT): 453 MS
QTC FREDERICIA: 397 MS
QTC FREDERICIA: 402 MS
R AXIS: 6 DEGREES
R AXIS: 9 DEGREES
RBC # BLD AUTO: 4.81 X10*6/UL (ref 4–5.2)
SODIUM SERPL-SCNC: 136 MMOL/L (ref 136–145)
T AXIS: 216 DEGREES
T AXIS: 230 DEGREES
T OFFSET: 367 MS
T OFFSET: 372 MS
TSH SERPL-ACNC: 2.07 MIU/L (ref 0.44–3.98)
VENTRICULAR RATE: 122 BPM
VENTRICULAR RATE: 124 BPM
WBC # BLD AUTO: 20.6 X10*3/UL (ref 4.4–11.3)

## 2024-01-03 PROCEDURE — 2500000001 HC RX 250 WO HCPCS SELF ADMINISTERED DRUGS (ALT 637 FOR MEDICARE OP): Performed by: STUDENT IN AN ORGANIZED HEALTH CARE EDUCATION/TRAINING PROGRAM

## 2024-01-03 PROCEDURE — 2500000005 HC RX 250 GENERAL PHARMACY W/O HCPCS: Performed by: STUDENT IN AN ORGANIZED HEALTH CARE EDUCATION/TRAINING PROGRAM

## 2024-01-03 PROCEDURE — 93971 EXTREMITY STUDY: CPT | Performed by: RADIOLOGY

## 2024-01-03 PROCEDURE — 83605 ASSAY OF LACTIC ACID: CPT | Performed by: NURSE PRACTITIONER

## 2024-01-03 PROCEDURE — 2500000004 HC RX 250 GENERAL PHARMACY W/ HCPCS (ALT 636 FOR OP/ED): Performed by: STUDENT IN AN ORGANIZED HEALTH CARE EDUCATION/TRAINING PROGRAM

## 2024-01-03 PROCEDURE — 36415 COLL VENOUS BLD VENIPUNCTURE: CPT | Performed by: NURSE PRACTITIONER

## 2024-01-03 PROCEDURE — 80048 BASIC METABOLIC PNL TOTAL CA: CPT | Performed by: STUDENT IN AN ORGANIZED HEALTH CARE EDUCATION/TRAINING PROGRAM

## 2024-01-03 PROCEDURE — 1200000002 HC GENERAL ROOM WITH TELEMETRY DAILY

## 2024-01-03 PROCEDURE — 96375 TX/PRO/DX INJ NEW DRUG ADDON: CPT

## 2024-01-03 PROCEDURE — 93971 EXTREMITY STUDY: CPT

## 2024-01-03 PROCEDURE — 84443 ASSAY THYROID STIM HORMONE: CPT | Performed by: STUDENT IN AN ORGANIZED HEALTH CARE EDUCATION/TRAINING PROGRAM

## 2024-01-03 PROCEDURE — 2500000004 HC RX 250 GENERAL PHARMACY W/ HCPCS (ALT 636 FOR OP/ED): Performed by: EMERGENCY MEDICINE

## 2024-01-03 PROCEDURE — 36415 COLL VENOUS BLD VENIPUNCTURE: CPT | Performed by: STUDENT IN AN ORGANIZED HEALTH CARE EDUCATION/TRAINING PROGRAM

## 2024-01-03 PROCEDURE — 96361 HYDRATE IV INFUSION ADD-ON: CPT

## 2024-01-03 PROCEDURE — 83735 ASSAY OF MAGNESIUM: CPT | Performed by: STUDENT IN AN ORGANIZED HEALTH CARE EDUCATION/TRAINING PROGRAM

## 2024-01-03 PROCEDURE — C9113 INJ PANTOPRAZOLE SODIUM, VIA: HCPCS | Performed by: EMERGENCY MEDICINE

## 2024-01-03 PROCEDURE — 99232 SBSQ HOSP IP/OBS MODERATE 35: CPT | Performed by: NURSE PRACTITIONER

## 2024-01-03 PROCEDURE — 85025 COMPLETE CBC W/AUTO DIFF WBC: CPT | Performed by: STUDENT IN AN ORGANIZED HEALTH CARE EDUCATION/TRAINING PROGRAM

## 2024-01-03 PROCEDURE — 84145 PROCALCITONIN (PCT): CPT | Mod: ELYLAB | Performed by: STUDENT IN AN ORGANIZED HEALTH CARE EDUCATION/TRAINING PROGRAM

## 2024-01-03 PROCEDURE — 99223 1ST HOSP IP/OBS HIGH 75: CPT | Performed by: STUDENT IN AN ORGANIZED HEALTH CARE EDUCATION/TRAINING PROGRAM

## 2024-01-03 RX ORDER — PANTOPRAZOLE SODIUM 40 MG/10ML
40 INJECTION, POWDER, LYOPHILIZED, FOR SOLUTION INTRAVENOUS ONCE
Status: COMPLETED | OUTPATIENT
Start: 2024-01-03 | End: 2024-01-03

## 2024-01-03 RX ORDER — SENNOSIDES 8.6 MG/1
2 TABLET ORAL NIGHTLY PRN
Status: DISCONTINUED | OUTPATIENT
Start: 2024-01-03 | End: 2024-01-08 | Stop reason: HOSPADM

## 2024-01-03 RX ORDER — FOLIC ACID 1 MG/1
1 TABLET ORAL DAILY
Status: DISCONTINUED | OUTPATIENT
Start: 2024-01-03 | End: 2024-01-08 | Stop reason: HOSPADM

## 2024-01-03 RX ORDER — LOSARTAN POTASSIUM 25 MG/1
25 TABLET ORAL DAILY
COMMUNITY
End: 2024-01-08 | Stop reason: HOSPADM

## 2024-01-03 RX ORDER — ATORVASTATIN CALCIUM 40 MG/1
40 TABLET, FILM COATED ORAL DAILY
COMMUNITY

## 2024-01-03 RX ORDER — DEXTROSE 50 % IN WATER (D50W) INTRAVENOUS SYRINGE
25
Status: DISCONTINUED | OUTPATIENT
Start: 2024-01-03 | End: 2024-01-08 | Stop reason: HOSPADM

## 2024-01-03 RX ORDER — DEXTROSE MONOHYDRATE 100 MG/ML
0.3 INJECTION, SOLUTION INTRAVENOUS ONCE AS NEEDED
Status: DISCONTINUED | OUTPATIENT
Start: 2024-01-03 | End: 2024-01-08 | Stop reason: HOSPADM

## 2024-01-03 RX ORDER — PYRIDOXINE HCL (VITAMIN B6) 100 MG
100 TABLET ORAL NIGHTLY
COMMUNITY

## 2024-01-03 RX ORDER — LEVOTHYROXINE SODIUM 175 UG/1
175 TABLET ORAL
COMMUNITY

## 2024-01-03 RX ORDER — HYDROCHLOROTHIAZIDE 12.5 MG/1
12.5 TABLET ORAL DAILY
COMMUNITY
End: 2024-01-08 | Stop reason: HOSPADM

## 2024-01-03 RX ORDER — GABAPENTIN 100 MG/1
100 CAPSULE ORAL NIGHTLY
Status: DISCONTINUED | OUTPATIENT
Start: 2024-01-03 | End: 2024-01-08 | Stop reason: HOSPADM

## 2024-01-03 RX ORDER — ONDANSETRON HYDROCHLORIDE 2 MG/ML
4 INJECTION, SOLUTION INTRAVENOUS EVERY 6 HOURS PRN
Status: DISCONTINUED | OUTPATIENT
Start: 2024-01-03 | End: 2024-01-08 | Stop reason: HOSPADM

## 2024-01-03 RX ORDER — PANTOPRAZOLE SODIUM 40 MG/10ML
40 INJECTION, POWDER, LYOPHILIZED, FOR SOLUTION INTRAVENOUS DAILY
Status: DISCONTINUED | OUTPATIENT
Start: 2024-01-04 | End: 2024-01-04

## 2024-01-03 RX ORDER — LANOLIN ALCOHOL/MO/W.PET/CERES
1000 CREAM (GRAM) TOPICAL DAILY
COMMUNITY

## 2024-01-03 RX ORDER — HYDROCHLOROTHIAZIDE 25 MG/1
12.5 TABLET ORAL DAILY
Status: DISCONTINUED | OUTPATIENT
Start: 2024-01-03 | End: 2024-01-08 | Stop reason: HOSPADM

## 2024-01-03 RX ORDER — UBIDECARENONE 75 MG
1000 CAPSULE ORAL DAILY
Status: DISCONTINUED | OUTPATIENT
Start: 2024-01-03 | End: 2024-01-08 | Stop reason: HOSPADM

## 2024-01-03 RX ORDER — FOLIC ACID 0.8 MG
0.8 TABLET ORAL DAILY
COMMUNITY

## 2024-01-03 RX ORDER — METOPROLOL TARTRATE 1 MG/ML
5 INJECTION, SOLUTION INTRAVENOUS ONCE
Status: COMPLETED | OUTPATIENT
Start: 2024-01-03 | End: 2024-01-03

## 2024-01-03 RX ORDER — ACETAMINOPHEN 160 MG/5ML
650 SOLUTION ORAL EVERY 4 HOURS PRN
Status: DISCONTINUED | OUTPATIENT
Start: 2024-01-03 | End: 2024-01-08 | Stop reason: HOSPADM

## 2024-01-03 RX ORDER — ATORVASTATIN CALCIUM 20 MG/1
40 TABLET, FILM COATED ORAL DAILY
Status: DISCONTINUED | OUTPATIENT
Start: 2024-01-03 | End: 2024-01-08 | Stop reason: HOSPADM

## 2024-01-03 RX ORDER — LOSARTAN POTASSIUM 25 MG/1
25 TABLET ORAL DAILY
Status: DISCONTINUED | OUTPATIENT
Start: 2024-01-03 | End: 2024-01-08 | Stop reason: HOSPADM

## 2024-01-03 RX ORDER — METOPROLOL TARTRATE 50 MG/1
100 TABLET ORAL 2 TIMES DAILY
Status: DISCONTINUED | OUTPATIENT
Start: 2024-01-03 | End: 2024-01-05

## 2024-01-03 RX ORDER — GABAPENTIN 100 MG/1
100 CAPSULE ORAL NIGHTLY
COMMUNITY

## 2024-01-03 RX ORDER — ACETAMINOPHEN 650 MG/1
650 SUPPOSITORY RECTAL EVERY 4 HOURS PRN
Status: DISCONTINUED | OUTPATIENT
Start: 2024-01-03 | End: 2024-01-08 | Stop reason: HOSPADM

## 2024-01-03 RX ORDER — SODIUM CHLORIDE, SODIUM LACTATE, POTASSIUM CHLORIDE, CALCIUM CHLORIDE 600; 310; 30; 20 MG/100ML; MG/100ML; MG/100ML; MG/100ML
75 INJECTION, SOLUTION INTRAVENOUS CONTINUOUS
Status: DISCONTINUED | OUTPATIENT
Start: 2024-01-03 | End: 2024-01-04

## 2024-01-03 RX ORDER — ACETAMINOPHEN 325 MG/1
650 TABLET ORAL EVERY 4 HOURS PRN
Status: DISCONTINUED | OUTPATIENT
Start: 2024-01-03 | End: 2024-01-08 | Stop reason: HOSPADM

## 2024-01-03 RX ORDER — METOPROLOL TARTRATE 100 MG/1
100 TABLET ORAL 2 TIMES DAILY
COMMUNITY

## 2024-01-03 RX ORDER — PYRIDOXINE HCL (VITAMIN B6) 100 MG
100 TABLET ORAL NIGHTLY
Status: DISCONTINUED | OUTPATIENT
Start: 2024-01-03 | End: 2024-01-08 | Stop reason: HOSPADM

## 2024-01-03 RX ORDER — ASPIRIN 81 MG/1
81 TABLET ORAL DAILY
COMMUNITY

## 2024-01-03 RX ORDER — SEMAGLUTIDE 0.68 MG/ML
0.5 INJECTION, SOLUTION SUBCUTANEOUS
COMMUNITY

## 2024-01-03 RX ADMIN — ATORVASTATIN CALCIUM 40 MG: 20 TABLET, FILM COATED ORAL at 20:15

## 2024-01-03 RX ADMIN — APIXABAN 5 MG: 5 TABLET, FILM COATED ORAL at 07:37

## 2024-01-03 RX ADMIN — ACETAMINOPHEN 650 MG: 325 TABLET ORAL at 15:01

## 2024-01-03 RX ADMIN — GABAPENTIN 100 MG: 100 CAPSULE ORAL at 20:15

## 2024-01-03 RX ADMIN — ONDANSETRON 4 MG: 2 INJECTION INTRAMUSCULAR; INTRAVENOUS at 05:21

## 2024-01-03 RX ADMIN — METOPROLOL TARTRATE 5 MG: 5 INJECTION INTRAVENOUS at 05:20

## 2024-01-03 RX ADMIN — ONDANSETRON 4 MG: 2 INJECTION INTRAMUSCULAR; INTRAVENOUS at 07:41

## 2024-01-03 RX ADMIN — FOLIC ACID 1 MG: 1 TABLET ORAL at 07:36

## 2024-01-03 RX ADMIN — PANTOPRAZOLE SODIUM 40 MG: 40 INJECTION, POWDER, FOR SOLUTION INTRAVENOUS at 01:22

## 2024-01-03 RX ADMIN — Medication 100 MG: at 20:16

## 2024-01-03 RX ADMIN — CYANOCOBALAMIN TAB 500 MCG 1000 MCG: 500 TAB at 07:37

## 2024-01-03 RX ADMIN — LEVOTHYROXINE SODIUM 175 MCG: 125 TABLET ORAL at 07:37

## 2024-01-03 RX ADMIN — ACETAMINOPHEN 650 MG: 325 TABLET ORAL at 20:16

## 2024-01-03 RX ADMIN — SODIUM CHLORIDE, POTASSIUM CHLORIDE, SODIUM LACTATE AND CALCIUM CHLORIDE 75 ML/HR: 600; 310; 30; 20 INJECTION, SOLUTION INTRAVENOUS at 05:21

## 2024-01-03 RX ADMIN — APIXABAN 5 MG: 5 TABLET, FILM COATED ORAL at 20:16

## 2024-01-03 RX ADMIN — ACETAMINOPHEN 650 MG: 325 TABLET ORAL at 05:21

## 2024-01-03 SDOH — SOCIAL STABILITY: SOCIAL INSECURITY
WITHIN THE LAST YEAR, HAVE TO BEEN RAPED OR FORCED TO HAVE ANY KIND OF SEXUAL ACTIVITY BY YOUR PARTNER OR EX-PARTNER?: NO

## 2024-01-03 SDOH — SOCIAL STABILITY: SOCIAL NETWORK: ARE YOU MARRIED, WIDOWED, DIVORCED, SEPARATED, NEVER MARRIED, OR LIVING WITH A PARTNER?: MARRIED

## 2024-01-03 SDOH — SOCIAL STABILITY: SOCIAL INSECURITY: WITHIN THE LAST YEAR, HAVE YOU BEEN HUMILIATED OR EMOTIONALLY ABUSED IN OTHER WAYS BY YOUR PARTNER OR EX-PARTNER?: NO

## 2024-01-03 SDOH — ECONOMIC STABILITY: FOOD INSECURITY: WITHIN THE PAST 12 MONTHS, THE FOOD YOU BOUGHT JUST DIDN'T LAST AND YOU DIDN'T HAVE MONEY TO GET MORE.: NEVER TRUE

## 2024-01-03 SDOH — ECONOMIC STABILITY: FOOD INSECURITY: WITHIN THE PAST 12 MONTHS, YOU WORRIED THAT YOUR FOOD WOULD RUN OUT BEFORE YOU GOT MONEY TO BUY MORE.: NEVER TRUE

## 2024-01-03 SDOH — ECONOMIC STABILITY: INCOME INSECURITY: IN THE PAST 12 MONTHS, HAS THE ELECTRIC, GAS, OIL, OR WATER COMPANY THREATENED TO SHUT OFF SERVICE IN YOUR HOME?: NO

## 2024-01-03 SDOH — HEALTH STABILITY: PHYSICAL HEALTH: ON AVERAGE, HOW MANY DAYS PER WEEK DO YOU ENGAGE IN MODERATE TO STRENUOUS EXERCISE (LIKE A BRISK WALK)?: 0 DAYS

## 2024-01-03 SDOH — SOCIAL STABILITY: SOCIAL INSECURITY: ABUSE: ADULT

## 2024-01-03 SDOH — SOCIAL STABILITY: SOCIAL INSECURITY: ARE YOU OR HAVE YOU BEEN THREATENED OR ABUSED PHYSICALLY, EMOTIONALLY, OR SEXUALLY BY ANYONE?: NO

## 2024-01-03 SDOH — SOCIAL STABILITY: SOCIAL INSECURITY: DOES ANYONE TRY TO KEEP YOU FROM HAVING/CONTACTING OTHER FRIENDS OR DOING THINGS OUTSIDE YOUR HOME?: NO

## 2024-01-03 SDOH — SOCIAL STABILITY: SOCIAL INSECURITY: WITHIN THE LAST YEAR, HAVE YOU BEEN AFRAID OF YOUR PARTNER OR EX-PARTNER?: NO

## 2024-01-03 SDOH — SOCIAL STABILITY: SOCIAL NETWORK
DO YOU BELONG TO ANY CLUBS OR ORGANIZATIONS SUCH AS CHURCH GROUPS UNIONS, FRATERNAL OR ATHLETIC GROUPS, OR SCHOOL GROUPS?: NO

## 2024-01-03 SDOH — SOCIAL STABILITY: SOCIAL INSECURITY
WITHIN THE LAST YEAR, HAVE YOU BEEN KICKED, HIT, SLAPPED, OR OTHERWISE PHYSICALLY HURT BY YOUR PARTNER OR EX-PARTNER?: NO

## 2024-01-03 SDOH — SOCIAL STABILITY: SOCIAL INSECURITY: DO YOU FEEL ANYONE HAS EXPLOITED OR TAKEN ADVANTAGE OF YOU FINANCIALLY OR OF YOUR PERSONAL PROPERTY?: NO

## 2024-01-03 SDOH — SOCIAL STABILITY: SOCIAL INSECURITY: DO YOU FEEL UNSAFE GOING BACK TO THE PLACE WHERE YOU ARE LIVING?: NO

## 2024-01-03 SDOH — SOCIAL STABILITY: SOCIAL INSECURITY: WERE YOU ABLE TO COMPLETE ALL THE BEHAVIORAL HEALTH SCREENINGS?: YES

## 2024-01-03 SDOH — SOCIAL STABILITY: SOCIAL NETWORK: HOW OFTEN DO YOU GET TOGETHER WITH FRIENDS OR RELATIVES?: ONCE A WEEK

## 2024-01-03 SDOH — SOCIAL STABILITY: SOCIAL NETWORK: HOW OFTEN DO YOU ATTENT MEETINGS OF THE CLUB OR ORGANIZATION YOU BELONG TO?: NEVER

## 2024-01-03 SDOH — SOCIAL STABILITY: SOCIAL NETWORK: IN A TYPICAL WEEK, HOW MANY TIMES DO YOU TALK ON THE PHONE WITH FAMILY, FRIENDS, OR NEIGHBORS?: THREE TIMES A WEEK

## 2024-01-03 SDOH — SOCIAL STABILITY: SOCIAL INSECURITY: HAS ANYONE EVER THREATENED TO HURT YOUR FAMILY OR YOUR PETS?: NO

## 2024-01-03 SDOH — SOCIAL STABILITY: SOCIAL INSECURITY: ARE THERE ANY APPARENT SIGNS OF INJURIES/BEHAVIORS THAT COULD BE RELATED TO ABUSE/NEGLECT?: NO

## 2024-01-03 SDOH — HEALTH STABILITY: PHYSICAL HEALTH: ON AVERAGE, HOW MANY MINUTES DO YOU ENGAGE IN EXERCISE AT THIS LEVEL?: 0 MIN

## 2024-01-03 SDOH — SOCIAL STABILITY: SOCIAL INSECURITY: HAVE YOU HAD THOUGHTS OF HARMING ANYONE ELSE?: NO

## 2024-01-03 SDOH — HEALTH STABILITY: MENTAL HEALTH: EXPERIENCED ANY OF THE FOLLOWING LIFE EVENTS: OTHER (COMMENT)

## 2024-01-03 SDOH — SOCIAL STABILITY: SOCIAL INSECURITY: POSSIBLE ABUSE REPORTED TO:: OTHER (COMMENT)

## 2024-01-03 SDOH — SOCIAL STABILITY: SOCIAL NETWORK: HOW OFTEN DO YOU ATTEND CHURCH OR RELIGIOUS SERVICES?: NEVER

## 2024-01-03 ASSESSMENT — ACTIVITIES OF DAILY LIVING (ADL)
HEARING - RIGHT EAR: FUNCTIONAL
ASSISTIVE_DEVICE: EYEGLASSES
BATHING: INDEPENDENT
TOILETING: INDEPENDENT
PATIENT'S MEMORY ADEQUATE TO SAFELY COMPLETE DAILY ACTIVITIES?: YES
HEARING - LEFT EAR: FUNCTIONAL
JUDGMENT_ADEQUATE_SAFELY_COMPLETE_DAILY_ACTIVITIES: YES
WALKS IN HOME: INDEPENDENT
FEEDING YOURSELF: INDEPENDENT
GROOMING: INDEPENDENT
LACK_OF_TRANSPORTATION: NO
LACK_OF_TRANSPORTATION: NO
ADEQUATE_TO_COMPLETE_ADL: YES
DRESSING YOURSELF: INDEPENDENT

## 2024-01-03 ASSESSMENT — PAIN - FUNCTIONAL ASSESSMENT
PAIN_FUNCTIONAL_ASSESSMENT: 0-10

## 2024-01-03 ASSESSMENT — COGNITIVE AND FUNCTIONAL STATUS - GENERAL
PATIENT BASELINE BEDBOUND: NO
MOBILITY SCORE: 24
DAILY ACTIVITIY SCORE: 24

## 2024-01-03 ASSESSMENT — LIFESTYLE VARIABLES
AUDIT-C TOTAL SCORE: 0
SKIP TO QUESTIONS 9-10: 1
AUDIT-C TOTAL SCORE: 0
HOW OFTEN DO YOU HAVE 6 OR MORE DRINKS ON ONE OCCASION: NEVER
HOW OFTEN DO YOU HAVE A DRINK CONTAINING ALCOHOL: NEVER
HOW MANY STANDARD DRINKS CONTAINING ALCOHOL DO YOU HAVE ON A TYPICAL DAY: PATIENT DOES NOT DRINK
SUBSTANCE_ABUSE_PAST_12_MONTHS: NO
PRESCIPTION_ABUSE_PAST_12_MONTHS: NO

## 2024-01-03 ASSESSMENT — PAIN DESCRIPTION - LOCATION
LOCATION: BACK
LOCATION: ABDOMEN

## 2024-01-03 ASSESSMENT — PAIN SCALES - GENERAL
PAINLEVEL_OUTOF10: 4
PAINLEVEL_OUTOF10: 3
PAINLEVEL_OUTOF10: 3
PAINLEVEL_OUTOF10: 2

## 2024-01-03 ASSESSMENT — COLUMBIA-SUICIDE SEVERITY RATING SCALE - C-SSRS
1. IN THE PAST MONTH, HAVE YOU WISHED YOU WERE DEAD OR WISHED YOU COULD GO TO SLEEP AND NOT WAKE UP?: NO
6. HAVE YOU EVER DONE ANYTHING, STARTED TO DO ANYTHING, OR PREPARED TO DO ANYTHING TO END YOUR LIFE?: NO
2. HAVE YOU ACTUALLY HAD ANY THOUGHTS OF KILLING YOURSELF?: NO

## 2024-01-03 ASSESSMENT — PATIENT HEALTH QUESTIONNAIRE - PHQ9
2. FEELING DOWN, DEPRESSED OR HOPELESS: NOT AT ALL
1. LITTLE INTEREST OR PLEASURE IN DOING THINGS: NOT AT ALL
SUM OF ALL RESPONSES TO PHQ9 QUESTIONS 1 & 2: 0

## 2024-01-03 ASSESSMENT — PAIN DESCRIPTION - DESCRIPTORS: DESCRIPTORS: ACHING

## 2024-01-03 ASSESSMENT — PAIN DESCRIPTION - ORIENTATION: ORIENTATION: UPPER

## 2024-01-03 NOTE — CARE PLAN
The patient's goals for the shift include safety    The clinical goals for the shift include maintain safety.

## 2024-01-03 NOTE — H&P
Medical Group History and Physical  ASSESSMENT & PLAN:     Intractable nausea and vomiting  -Suspect secondary to ozempic dosing given recent initiation of medication and proximity of symptoms to recent dosing. CT A/P reviewed and relatively unremarkable for etiology. Demonstrating mild stranding around descending duodenum which could represent duodentis, but unsure if that clinically fits this picutre.    Plan:  -IV fluids  -Zofran PRN  -Pain control  -Clear liquid diet, escalate as tolerated    Leukocytosis  -WBC 20.3 on arrial. Suspect reactive to intractable nausea/vomiting. Other than potential duodenitis, no clear source of infection. Will send procalcitonin to rule in/out bacterial etiology, treat supportively, and monitor for now.     Lactic acidosis  -Lactate 2.6 -> 2.1. Will continue IV fluids and monitor    Persistent Afib with RVR  -Patient with longstanding history of A-fib on Eliquis.  Ordered for night doses of metoprolol    Plan:  -Will manage with PRN IV metoprolol pending home medications being entered in system  -Continued on eleiquis    Adrenal Incidentaloma's  -2.9 cm left and 1 cm right adrenal nodule/presumed adenomas noted on CT A/P. Radiology advising follow-up multiphase CT or MRI w/ chemical shift recommended  -Will monitor for now    Chronic appearing lacunar infarcts in right basal ganglia and left thalamus  -Above findings noted on CT head on arrival.  Patient informed of findings.  As not profoundly symptomatic and appear chronicity of findings we will defer further workup at this time    Covid +  -Believe patient is still testing positive from initial diagnosis in Early December. Will defer steroids/redesevir at this time    Hx HTN, Hypothyroidism  -Home meds to be resumed as applicable pending verification    VTE Prophylaxis: on eliquis      ---Of note, this documentation is completed using the Dragon Dictation system (voice recognition software). There may be spelling and/or  grammatical errors that were not corrected prior to final submission.---    Valdo Landers MD    HISTORY OF PRESENT ILLNESS:   Chief Complaint: Intractable Nausea/vomiting    History Of Present Illness:    Mateo Enriquez is a 58 y.o. female with a past medical history including hypothyroidism status post resection of papillary thyroid cancer, hypertension, A-fib, and class III obesity who presents to hospital due to concerns of intractable nausea and vomiting.  Patient reports being started on Ozempic approximately 3 weeks ago.  States she has been tolerating well and took her last dose of medication on 12/31.  Reports she began to experience episodes of emesis on 12/1.  Describes emesis as nonbloody, appearing like recent food intake as well as dinner from the night before.  States she has not been overeating.  Reports eating a larger dinner with protein shakes and peanut butter toast throughout the day otherwise. Tried to sleep it off last night however waking up very sweaty.  Stated she was sweating and shaking a lot this morning with persistent nausea and dizziness limiting her ability to ambulate.  Experienced continued bouts of vomiting which prompted her to present to the ED for evaluation.  Patient is endorsing abdominal discomfort.  Denying any constipation or diarrhea.  Denying any fevers or chills.      Patient states she has been persistently sick since the beginning of December when she tested positive for COVID.  Reports     Patient reports that symptoms began 1 to 2 days ago.  Reports the development of abdominal pain at this timeframe.  States she has had difficulty keeping food down              Review of systems: 10 point review of systems is otherwise negative except as mentioned above.    PAST HISTORIES:     Past Medical History:  She has no past medical history on file.    Past Surgical History:  She has no past surgical history on file.      Social History:  She has no history on file for  tobacco use, alcohol use, and drug use.    Family History:  No family history on file.     Allergies:  Patient has no known allergies.    OBJECTIVE:     Last Recorded Vitals:  Vitals:    01/02/24 1913 01/02/24 1914 01/02/24 2132 01/03/24 0033   BP: 102/57  97/60 97/70   BP Location:   Left arm Left arm   Patient Position:    Sitting   Pulse:   (!) 114 (!) 111   Resp:   22 22   Temp:  36 °C (96.8 °F) 37.2 °C (99 °F) 36.7 °C (98.1 °F)   TempSrc:   Temporal Temporal   SpO2:   96% 98%   Weight:       Height:         Last I/O:  No intake/output data recorded.    Physical Exam  General: Obese adult female in mild distress  HEENT: Clear sclera, EOMI, trachea midline, moist mucous membranes  Respiratory: Lungs clear to auscultation, with no wheezes or rhonchi appreciated  Cardiovascular: S1 and S2 auscultated, tachycardic, irregularly irregular  Abdomen: Soft, minimally tender to palpation, nondistended  Extremities: No cyanosis or clubbing appreciated  Neurological: Spontaneously moves all extremities, no dysarthria, cranial nerves grossly intact  Psychiatric: Appropriate mood and affect  Skin: Warm, dry      Scheduled Medications    PRN Medications    Continuous Medications      Outpatient Medications:  Prior to Admission medications    Not on File       LABS AND IMAGING:     Labs:  Results from last 7 days   Lab Units 01/02/24  1920   WBC AUTO x10*3/uL 20.3*   RBC AUTO x10*6/uL 5.02   HEMOGLOBIN g/dL 16.7*   HEMATOCRIT % 48.4*   MCV fL 96   MCH pg 33.3   MCHC g/dL 34.5   RDW % 13.8   PLATELETS AUTO x10*3/uL 219     Results from last 7 days   Lab Units 01/02/24  1920   SODIUM mmol/L 138   POTASSIUM mmol/L 4.0   CHLORIDE mmol/L 98   CO2 mmol/L 28   BUN mg/dL 32*   CREATININE mg/dL 1.63*   GLUCOSE mg/dL 146*   PROTEIN TOTAL g/dL 7.6   CALCIUM mg/dL 9.8   BILIRUBIN TOTAL mg/dL 1.4*   ALK PHOS U/L 95   AST U/L 29   ALT U/L 28     Results from last 7 days   Lab Units 01/02/24  1920   MAGNESIUM mg/dL 1.74     Results from last  7 days   Lab Units 01/02/24 2005 01/02/24  1920   TROPHS ng/L 9 9       Imaging:  CT abdomen pelvis w IV contrast  Narrative: STUDY:  CT Abdomen and Pelvis with IV Contrast; 01/02/2024 at 9:27 PM  INDICATION:  Unspecified abdominal pain, nausea and vomiting.  COMPARISON:  Correlation with CTA chest, XR chest 11/13/20.  ACCESSION NUMBER(S):  [id number]  ORDERING CLINICIAN:  ARIADNE ORELLANA  TECHNIQUE:  CT of the abdomen and pelvis was performed.  Contiguous axial images  were obtained at 3 mm slice thickness through the abdomen and pelvis.   Coronal and sagittal reconstructions at 3 mm slice thickness were  performed.  Omnipaque-350 75 mL was administered intravenously.    FINDINGS:  LOWER CHEST:  Heart is mildly enlarged. Atherosclerosis of the coronary arteries is  present. No pericardial effusion.  Lung bases are clear.     ABDOMEN:     LIVER:  No hepatomegaly.  Smooth surface contour.  Normal attenuation.     BILE DUCTS:  Mild biliary dilatation is present, often seen after cholecystectomy.     GALLBLADDER:  Patient is status post cholecystectomy.    STOMACH:  No abnormalities identified.     PANCREAS:  Mild fatty infiltration of the pancreas is present.      SPLEEN:  No splenomegaly or focal splenic lesion.     ADRENAL GLANDS:  LEFT adrenal nodule again noted measuring 2.9 cm a comprehensive 2 cm.   Lesion is considered indeterminate on this examination but likely a  slowly growing adenoma.  Small presumed adenoma RIGHT adrenal gland  noted measuring 1 cm maximally.  Follow-up multiphase CT or MRI with  chemical shift imaging recommended for further assessment.     KIDNEYS AND URETERS:  Kidneys are normal in size and location.  No renal or ureteral  calculi.     PELVIS:     BLADDER:  Trace amount of air within urinary bladder is present.  Clinical  correlation for recent catheterization recommended.      REPRODUCTIVE ORGANS:  No abnormalities identified.     BOWEL:  Mild stranding seen about the descending  duodenum.  Clinical  correlation for signs of duodenitis recommended.  Acute pancreatitis  considered less likely but not completely excluded.       VESSELS:  Atherosclerosis of the abdominal aorta and iliac arteries is noted.   Abdominal aorta is normal in caliber.      PERITONEUM/RETROPERITONEUM/LYMPH NODES:  No free fluid.  No pneumoperitoneum. Scattered phleboliths are present  within the pelvis.    No lymphadenopathy.     ABDOMINAL WALL:  No abnormalities identified.  SOFT TISSUES:   No abnormalities identified.     BONES:  Mild senescent changes of lumbar spine are present. Mild degenerative  changes noted about the hips bilaterally. No acute fracture or  aggressive osseous lesion.  Impression: 1. Mild stranding about the descending duodenum. Clinical correlation  for signs of duodenitis recommended. Acute pancreatitis considered  less likely but not excluded.  2. Bilateral adrenal lesions, LEFT greater than RIGHT. These are  considered indeterminate. Follow-up multiphase CT or MRI with chemical  shift imaging recommended for further assessment.  3. Air within urinary bladder. Clinical correlation for recent  catheterization recommended.  Signed by Barry Sim II, MD  CT head wo IV contrast  Narrative: Interpreted By:  Max Carrion,   STUDY:  CT HEAD WO IV CONTRAST;  1/2/2024 9:28 pm      INDICATION:  Signs/Symptoms:dizziness.      COMPARISON:  None.      ACCESSION NUMBER(S):  [id number]      ORDERING CLINICIAN:  ARIADNE ORELLANA      TECHNIQUE:  Axial noncontrast CT images of the head.      FINDINGS:  Gray-white matter differentiation is maintained. There are no  extra-axial collections. No mass effect or midline shift. There is no  hydrocephalus. There is mild generalized cerebral volume loss and  associated ventricular and sulcal enlargement. Chronic appearing  lacunar infarcts in the right basal ganglia and left thalamus.  Scattered periventricular and deep white matter hypodensities  suggestive of mild  to moderate chronic microvascular ischemic change.      HEMORRHAGE: No acute intracranial hemorrhage.      CALVARIUM: No depressed skull fracture.      EXTRACRANIAL SOFT TISSUES:.. Unremarkable.      PARANASAL SINUSES/MASTOIDS: Moderate polypoid mucosal thickening of  the right maxillary sinus and mild polypoid mucosal thickening of the  left maxillary sinus. The remainder of the paranasal sinuses are well  aerated. Mastoid air cells are clear.      ORBITS: Grossly normal.      Impression: No evidence of acute intracranial hemorrhage or acute cortical  infarct. Mild to moderate chronic microvascular ischemic change with  chronic appearing lacunar infarcts in the right basal ganglia and  left thalamus.      Signed by: Max Carrion 1/2/2024 10:00 PM  Dictation workstation:   [id number]  XR chest 1 view  Narrative: Interpreted By:  Max Carrion,   STUDY:  XR CHEST 1 VIEW;  1/2/2024 8:01 pm      INDICATION:  Signs/Symptoms:Chest Pain.      COMPARISON:  Chest radiograph dated 11/03/2020.      ACCESSION NUMBER(S):  [id number]      ORDERING CLINICIAN:  ARIADNE ORELLANA      FINDINGS:      CARDIOMEDIASTINAL SILHOUETTE:  Cardiomediastinal silhouette is stable in size and configuration.      LUNGS/PLEURA:  There are no consolidations.There are no pleural effusions. There is  no demonstrated pneumothorax.      LINES/TUBES: There are surgical clips in the bilateral lower neck  related to prior thyroidectomy.      BONES: No evidence of acute osseous abnormality.      Impression: 1.  No evidence of acute cardiopulmonary process.          Signed by: Max Carrion 1/2/2024 8:31 PM  Dictation workstation:   [id number]  ECG 12 lead  Atrial fibrillation with rapid ventricular response  ST & T wave abnormality, consider inferolateral ischemia  Abnormal ECG  When compared with ECG of 17-NOV-2020 07:07,  Vent. rate has increased BY  44 BPM  T wave inversion now evident in Inferior leads  T wave inversion now evident in Lateral  leads  See ED provider note for full interpretation and clinical correlation

## 2024-01-03 NOTE — PROGRESS NOTES
PROGRESS NOTE    Subjective     Patient seen and examined today.  Sitting on the side of the bed with no complaints currently.  No current abdominal pain, nausea or emesis.  She states the last time she received antiemetics was around 730 this a.m.  Tolerating clear liquid diet well.    Objective     Last Recorded Vitals    Visit Vitals  BP 98/52   Pulse 93   Temp 35.6 °C (96.1 °F)   Resp 20        3 Day Weight Change: Unable to Calculate       Intake/Output Summary (Last 24 hours) at 1/3/2024 0957  Last data filed at 1/3/2024 0833  Gross per 24 hour   Intake 843.75 ml   Output --   Net 843.75 ml        Physical Exam  Vitals reviewed.   Constitutional:       Appearance: Normal appearance. She is obese.   HENT:      Head: Normocephalic and atraumatic.      Mouth/Throat:      Mouth: Mucous membranes are moist.   Eyes:      Extraocular Movements: Extraocular movements intact.      Pupils: Pupils are equal, round, and reactive to light.   Cardiovascular:      Rate and Rhythm: Normal rate and regular rhythm.      Pulses: Normal pulses.      Heart sounds: Normal heart sounds. No murmur heard.     No gallop.   Pulmonary:      Effort: Pulmonary effort is normal. No respiratory distress.      Breath sounds: Normal breath sounds. No wheezing, rhonchi or rales.   Abdominal:      General: Abdomen is flat. Bowel sounds are normal. There is no distension.      Palpations: Abdomen is soft. There is no mass.      Tenderness: There is no abdominal tenderness. There is no rebound.      Hernia: No hernia is present.   Musculoskeletal:         General: No swelling, tenderness or signs of injury. Normal range of motion.      Cervical back: Normal range of motion and neck supple.      Right lower leg: No edema.      Left lower leg: No edema.   Skin:     General: Skin is warm and dry.      Capillary Refill: Capillary refill takes less than 2 seconds.      Findings: No bruising, erythema or rash.   Neurological:      General: No focal  deficit present.      Mental Status: She is alert and oriented to person, place, and time.      Cranial Nerves: No cranial nerve deficit.      Sensory: No sensory deficit.      Motor: No weakness.   Psychiatric:         Mood and Affect: Mood normal.         Behavior: Behavior normal.          Scheduled medications  apixaban, 5 mg, oral, q12h  atorvastatin, 40 mg, oral, Daily  cyanocobalamin, 1,000 mcg, oral, Daily  folic acid, 1 mg, oral, Daily  gabapentin, 100 mg, oral, Nightly  [Held by provider] hydroCHLOROthiazide, 12.5 mg, oral, Daily  levothyroxine, 175 mcg, oral, Daily before breakfast  [Held by provider] losartan, 25 mg, oral, Daily  [Held by provider] metoprolol tartrate, 100 mg, oral, BID  pyridoxine, 100 mg, oral, Nightly      Continuous medications  lactated Ringer's, 75 mL/hr, Last Rate: 75 mL/hr (01/03/24 0521)      PRN medications  PRN medications: acetaminophen **OR** acetaminophen **OR** acetaminophen, dextrose 10 % in water (D10W), dextrose, glucagon, ondansetron, sennosides       Relevant Results    Results for orders placed or performed during the hospital encounter of 01/02/24 (from the past 96 hour(s))   CBC and Auto Differential   Result Value Ref Range    WBC 20.3 (H) 4.4 - 11.3 x10*3/uL    nRBC 0.0 0.0 - 0.0 /100 WBCs    RBC 5.02 4.00 - 5.20 x10*6/uL    Hemoglobin 16.7 (H) 12.0 - 16.0 g/dL    Hematocrit 48.4 (H) 36.0 - 46.0 %    MCV 96 80 - 100 fL    MCH 33.3 26.0 - 34.0 pg    MCHC 34.5 32.0 - 36.0 g/dL    RDW 13.8 11.5 - 14.5 %    Platelets 219 150 - 450 x10*3/uL    Neutrophils % 89.5 40.0 - 80.0 %    Immature Granulocytes %, Automated 0.6 0.0 - 0.9 %    Lymphocytes % 4.3 13.0 - 44.0 %    Monocytes % 5.2 2.0 - 10.0 %    Eosinophils % 0.0 0.0 - 6.0 %    Basophils % 0.4 0.0 - 2.0 %    Neutrophils Absolute 18.19 (H) 1.20 - 7.70 x10*3/uL    Immature Granulocytes Absolute, Automated 0.13 0.00 - 0.70 x10*3/uL    Lymphocytes Absolute 0.88 (L) 1.20 - 4.80 x10*3/uL    Monocytes Absolute 1.05 (H)  0.10 - 1.00 x10*3/uL    Eosinophils Absolute 0.01 0.00 - 0.70 x10*3/uL    Basophils Absolute 0.08 0.00 - 0.10 x10*3/uL   Comprehensive Metabolic Panel   Result Value Ref Range    Glucose 146 (H) 74 - 99 mg/dL    Sodium 138 136 - 145 mmol/L    Potassium 4.0 3.5 - 5.3 mmol/L    Chloride 98 98 - 107 mmol/L    Bicarbonate 28 21 - 32 mmol/L    Anion Gap 16 10 - 20 mmol/L    Urea Nitrogen 32 (H) 6 - 23 mg/dL    Creatinine 1.63 (H) 0.50 - 1.05 mg/dL    eGFR 37 (L) >60 mL/min/1.73m*2    Calcium 9.8 8.6 - 10.3 mg/dL    Albumin 3.8 3.4 - 5.0 g/dL    Alkaline Phosphatase 95 33 - 110 U/L    Total Protein 7.6 6.4 - 8.2 g/dL    AST 29 9 - 39 U/L    Bilirubin, Total 1.4 (H) 0.0 - 1.2 mg/dL    ALT 28 7 - 45 U/L   Magnesium   Result Value Ref Range    Magnesium 1.74 1.60 - 2.40 mg/dL   Lactate   Result Value Ref Range    Lactate 2.6 (H) 0.4 - 2.0 mmol/L   Protime-INR   Result Value Ref Range    Protime 18.3 (H) 9.8 - 12.8 seconds    INR 1.6 (H) 0.9 - 1.1   B-Type Natriuretic Peptide   Result Value Ref Range     (H) 0 - 99 pg/mL   Troponin I, High Sensitivity, Initial   Result Value Ref Range    Troponin I, High Sensitivity 9 0 - 13 ng/L   Sars-CoV-2 and Influenza A/B PCR   Result Value Ref Range    Flu A Result Not Detected Not Detected    Flu B Result Not Detected Not Detected    Coronavirus 2019, PCR Detected (A) Not Detected   ECG 12 lead   Result Value Ref Range    Ventricular Rate 124 BPM    Atrial Rate 94 BPM    QRS Duration 92 ms    QT Interval 316 ms    QTC Calculation(Bazett) 453 ms    R Axis 6 degrees    T Axis 216 degrees    QRS Count 20 beats    Q Onset 209 ms    T Offset 367 ms    QTC Fredericia 402 ms   Troponin, High Sensitivity, 1 Hour   Result Value Ref Range    Troponin I, High Sensitivity 9 0 - 13 ng/L   Lactate   Result Value Ref Range    Lactate 2.1 (H) 0.4 - 2.0 mmol/L   Lipase   Result Value Ref Range    Lipase 49 9 - 82 U/L   ECG 12 lead   Result Value Ref Range    Ventricular Rate 122 BPM    Atrial  Rate 141 BPM    QRS Duration 76 ms    QT Interval 314 ms    QTC Calculation(Bazett) 447 ms    R Axis 9 degrees    T Axis 230 degrees    QRS Count 20 beats    Q Onset 215 ms    T Offset 372 ms    QTC Fredericia 397 ms   SST TOP   Result Value Ref Range    Extra Tube Hold for add-ons.    Magnesium   Result Value Ref Range    Magnesium 1.70 1.60 - 2.40 mg/dL   Basic Metabolic Panel   Result Value Ref Range    Glucose 146 (H) 74 - 99 mg/dL    Sodium 136 136 - 145 mmol/L    Potassium 3.7 3.5 - 5.3 mmol/L    Chloride 98 98 - 107 mmol/L    Bicarbonate 27 21 - 32 mmol/L    Anion Gap 15 10 - 20 mmol/L    Urea Nitrogen 31 (H) 6 - 23 mg/dL    Creatinine 1.64 (H) 0.50 - 1.05 mg/dL    eGFR 36 (L) >60 mL/min/1.73m*2    Calcium 8.8 8.6 - 10.3 mg/dL   CBC and Auto Differential   Result Value Ref Range    WBC 20.6 (H) 4.4 - 11.3 x10*3/uL    nRBC 0.0 0.0 - 0.0 /100 WBCs    RBC 4.81 4.00 - 5.20 x10*6/uL    Hemoglobin 15.7 12.0 - 16.0 g/dL    Hematocrit 46.6 (H) 36.0 - 46.0 %    MCV 97 80 - 100 fL    MCH 32.6 26.0 - 34.0 pg    MCHC 33.7 32.0 - 36.0 g/dL    RDW 14.2 11.5 - 14.5 %    Platelets 213 150 - 450 x10*3/uL    Neutrophils % 88.9 40.0 - 80.0 %    Immature Granulocytes %, Automated 0.8 0.0 - 0.9 %    Lymphocytes % 5.2 13.0 - 44.0 %    Monocytes % 4.7 2.0 - 10.0 %    Eosinophils % 0.0 0.0 - 6.0 %    Basophils % 0.4 0.0 - 2.0 %    Neutrophils Absolute 18.30 (H) 1.20 - 7.70 x10*3/uL    Immature Granulocytes Absolute, Automated 0.16 0.00 - 0.70 x10*3/uL    Lymphocytes Absolute 1.07 (L) 1.20 - 4.80 x10*3/uL    Monocytes Absolute 0.97 0.10 - 1.00 x10*3/uL    Eosinophils Absolute 0.00 0.00 - 0.70 x10*3/uL    Basophils Absolute 0.08 0.00 - 0.10 x10*3/uL   TSH with reflex to Free T4 if abnormal   Result Value Ref Range    Thyroid Stimulating Hormone 2.07 0.44 - 3.98 mIU/L     Assessment and Plan     1/3/2024  Reviewed patient CT of head.  Given that she is grossly asymptomatic and these are likely incidental findings recommend she  follow-up with her PCP in the outpatient setting for further evaluation if she chooses to do so.  Otherwise refer to CT of abdomen results, patient current Ozempic therapy and her symptoms.  With likely viral gastroenteritis versus bacterial gastroenteritis.  She has no diarrhea at this time so unable to obtain any stool for pathogens.  Procalcitonin level came back elevated at 4.37.  Lactic acidosis is now resolved with IV fluid therapy.  Leukocytosis continues with stable flat trend now at 20.6 from 20.3.  Gastroenterology has been consulted for their evaluation and recommendations.  Also discussed dehydration symptoms on admission with patient.  Patient will remain hospitalized today for further IV hydration therapy with supportive treatment of nausea with antiemetics and advancement of diet to soft textures later this evening.  Discussed discharge planning with patient and family at bedside, if she tolerates soft textures diet without any significant nausea, emesis or abdominal pain and she is okay from GI standpoint for discharge anticipate discharge tomorrow after evaluation.    Intractable nausea and vomiting  -Suspect secondary to ozempic dosing given recent initiation of medication and proximity of symptoms to recent dosing. CT A/P reviewed and relatively unremarkable for etiology. Demonstrating mild stranding around descending duodenum which could represent duodentis, but unsure if that clinically fits this picutre.     Plan:  -Monitor and replace electrolytes per protocol  -CBC, BMP and magnesium level daily while admitted  -IV fluids  -As needed antiemetics  -Pain control  -Clear liquid diet, escalate as tolerated  -Consult GI team for their eval and recs     Leukocytosis  -WBC 20.3 > 20.6.   -Procalcitonin to rule in/out bacterial etiology- pending  -Treat supportively, and monitor for now.      Lactic acidosis  -Lactate 2.6 -> 2.1. Will continue IV fluids and monitor  -Lactic recheck pending today      Persistent Afib with RVR  -Patient with longstanding history of A-fib on Eliquis.  Ordered for night doses of metoprolol     Plan:  -Will manage with PRN IV metoprolol pending home medications being entered in system  -Continued on eleiquis     Adrenal Incidentaloma's  -2.9 cm left and 1 cm right adrenal nodule/presumed adenomas noted on CT A/P. Radiology advising follow-up multiphase CT or MRI w/ chemical shift recommended  -Will monitor for now     Chronic appearing lacunar infarcts in right basal ganglia and left thalamus  -Above findings noted on CT head on arrival.  Patient informed of findings.  As not profoundly symptomatic and appear chronicity of findings we will defer further workup at this time     Covid +  -Believe patient is still testing positive from initial diagnosis in Early December. Will defer steroids/remdesevir at this time     Hx HTN, Hypothyroidism  -Home meds to be continued as appropriate     VTE Prophylaxis: on eliquis      Principal Problem:    Nausea and vomiting, unspecified vomiting type       Plan of care was discussed extensively with patient. Patient verbalized understanding through teach back method. All questions and concerns addressed upon examination.     Of note, this documentation is completed using the Dragon Dictation system (voice recognition software). There may be spelling and/or grammatical errors that were not corrected prior to final submission

## 2024-01-03 NOTE — ED PROVIDER NOTES
58-year-old female presents emergency department company by family with multiple complaints.  Patient reportedly has had cough and shortness of breath since Thanksgiving when she was diagnosed with COVID.  Patient has also recently started Ozempic for weight loss with her last dose being on Sunday.  She states today she has had nausea and vomiting all day.  She also reports diarrhea and abdominal pain.  Reports subjective fevers patient has had sweats and chills.  Patient does report some chest tightness that is intermittent along with shortness of breath.  She describes her abdominal pain as an aching pain that is worse in her upper abdomen.  No dysuria or hematuria.  No black or bloody stools.  Patient denies constipation. No recent head injury or trauma.  Though patient does report that she has bad vertigo and feels unbalanced when she is ambulating.  She reports this is a chronic problem for her.  Patient is on Eliquis.  She has significant past medical history for hypertension, elevated BMI obstructive sleep apnea, paroxysmal atrial fibrillation, thyroid carcinoma, postoperative hypothyroidism.  No reported tobacco use, illicit drug use, or regular alcohol use.      History provided by:  Patient   used: No           ------------------------------------------------------------------------------------------------------------------------------------------    VS: As documented in the triage note and EMR flowsheet from this visit were reviewed.    Review of Systems  Constitutional: Reports subjective fevers, sweats, chills.  Admits to malaise  Eyes: no redness, discharge, pain  HENT: no sore throat, nose bleeds, congestion, rhinorrhea   Cardiovascular: Reports chest tightness no leg edema, palpitations  Respiratory: Admits to shortness of breath and cough,  GI: Reports nausea, vomiting, diarrhea, and abdominal pain no constipation, BRBPR, melena  : no dysuria, frequency,  hematuria  Musculoskeletal: no neck pain, stiffness,  no joint deformity, swelling  Skin: no rash, erythema, wounds  Neurological: no headache, lightheadedness, numbness, or tingling admits to feeling generally weak and off balance.  Reports dizziness related to her known vertigo  Psychiatric: no suicidal thoughts, confusion, agitation  Metabolic: no polyuria or polydipsia patient recently started on Ozempic  Hematologic: Patient is on Eliquis  Immunology: No immunocompromise state    Critical access hospital  Nursing notes reviewed and confirmed by me.  Chart review performed including medications, allergies, and medical, surgical, and family history  Visit Vitals  BP 97/70 (BP Location: Left arm, Patient Position: Sitting)   Pulse (!) 111   Temp 36.7 °C (98.1 °F) (Temporal)   Resp 22     Physical Exam  Vitals and nursing note reviewed.   Constitutional:       General: She is not in acute distress.     Appearance: Normal appearance. She is not ill-appearing.      Comments: Patient is somewhat diaphoretic on my exam   HENT:      Head: Normocephalic and atraumatic.      Right Ear: External ear normal.      Left Ear: External ear normal.      Nose: Nose normal. No congestion or rhinorrhea.      Mouth/Throat:      Mouth: Mucous membranes are dry.      Pharynx: No oropharyngeal exudate or posterior oropharyngeal erythema.   Eyes:      Extraocular Movements: Extraocular movements intact.      Conjunctiva/sclera: Conjunctivae normal.      Pupils: Pupils are equal, round, and reactive to light.   Cardiovascular:      Rate and Rhythm: Tachycardia present. Rhythm irregular.      Pulses: Normal pulses.      Heart sounds: Normal heart sounds.   Pulmonary:      Effort: Pulmonary effort is normal. No respiratory distress.      Breath sounds: No stridor. No wheezing, rhonchi or rales.      Comments: Coarse breath sounds bilaterally somewhat diminished in the bases  Abdominal:      General: There is no distension.      Palpations: Abdomen is soft.       Tenderness: There is abdominal tenderness (Abdomen diffusely tender to palpation difficult to localize). There is no guarding or rebound.   Musculoskeletal:         General: No swelling or deformity. Normal range of motion.      Cervical back: Normal range of motion and neck supple. No rigidity.      Right lower leg: Edema present.      Left lower leg: Edema present.      Comments: No calf tenderness 1+ pitting edema bilateral lower extremities at baseline.     Skin:     General: Skin is warm and dry.      Capillary Refill: Capillary refill takes less than 2 seconds.      Coloration: Skin is not jaundiced.      Findings: No rash.   Neurological:      General: No focal deficit present.      Mental Status: She is alert and oriented to person, place, and time.      Sensory: No sensory deficit.      Motor: No weakness.      Comments: Cranial nerves II through XII grossly intact.   Psychiatric:         Mood and Affect: Mood normal.         Behavior: Behavior normal.        No past medical history on file.   No past surgical history on file.   Social History     Socioeconomic History    Marital status:      Spouse name: Not on file    Number of children: Not on file    Years of education: Not on file    Highest education level: Not on file   Occupational History    Not on file   Tobacco Use    Smoking status: Not on file    Smokeless tobacco: Not on file   Substance and Sexual Activity    Alcohol use: Not on file    Drug use: Not on file    Sexual activity: Not on file   Other Topics Concern    Not on file   Social History Narrative    Not on file     Social Determinants of Health     Financial Resource Strain: Not on file   Food Insecurity: Not on file   Transportation Needs: Not on file   Physical Activity: Not on file   Stress: Not on file   Social Connections: Not on file   Intimate Partner Violence: Not on file   Housing Stability: Not on file       ------------------------------------------------------------------------------------------------------------------------------------------  CT head wo IV contrast   Final Result   No evidence of acute intracranial hemorrhage or acute cortical   infarct. Mild to moderate chronic microvascular ischemic change with   chronic appearing lacunar infarcts in the right basal ganglia and   left thalamus.        Signed by: Max Carrion 1/2/2024 10:00 PM   Dictation workstation:   [id number]      CT abdomen pelvis w IV contrast   Final Result   1. Mild stranding about the descending duodenum. Clinical correlation   for signs of duodenitis recommended. Acute pancreatitis considered   less likely but not excluded.   2. Bilateral adrenal lesions, LEFT greater than RIGHT. These are   considered indeterminate. Follow-up multiphase CT or MRI with chemical   shift imaging recommended for further assessment.   3. Air within urinary bladder. Clinical correlation for recent   catheterization recommended.   Signed by Barry Sim II, MD      XR chest 1 view   Final Result   1.  No evidence of acute cardiopulmonary process.             Signed by: Max Carrion 1/2/2024 8:31 PM   Dictation workstation:   [id number]         Labs Reviewed   CBC WITH AUTO DIFFERENTIAL - Abnormal       Result Value    WBC 20.3 (*)     nRBC 0.0      RBC 5.02      Hemoglobin 16.7 (*)     Hematocrit 48.4 (*)     MCV 96      MCH 33.3      MCHC 34.5      RDW 13.8      Platelets 219      Neutrophils % 89.5      Immature Granulocytes %, Automated 0.6      Lymphocytes % 4.3      Monocytes % 5.2      Eosinophils % 0.0      Basophils % 0.4      Neutrophils Absolute 18.19 (*)     Immature Granulocytes Absolute, Automated 0.13      Lymphocytes Absolute 0.88 (*)     Monocytes Absolute 1.05 (*)     Eosinophils Absolute 0.01      Basophils Absolute 0.08     COMPREHENSIVE METABOLIC PANEL - Abnormal    Glucose 146 (*)     Sodium 138      Potassium 4.0      Chloride 98       Bicarbonate 28      Anion Gap 16      Urea Nitrogen 32 (*)     Creatinine 1.63 (*)     eGFR 37 (*)     Calcium 9.8      Albumin 3.8      Alkaline Phosphatase 95      Total Protein 7.6      AST 29      Bilirubin, Total 1.4 (*)     ALT 28     LACTATE - Abnormal    Lactate 2.6 (*)     Narrative:     Venipuncture immediately after or during the administration of Metamizole may lead to falsely low results. Testing should be performed immediately  prior to Metamizole dosing.   PROTIME-INR - Abnormal    Protime 18.3 (*)     INR 1.6 (*)    B-TYPE NATRIURETIC PEPTIDE - Abnormal     (*)     Narrative:        <100 pg/mL - Heart failure unlikely  100-299 pg/mL - Intermediate probability of acute heart                  failure exacerbation. Correlate with clinical                  context and patient history.    >=300 pg/mL - Heart Failure likely. Correlate with clinical                  context and patient history.    BNP testing is performed using different testing methodology at [facility] than at other [facility]. Direct result comparisons should only be made within the same method.      SARS-COV-2 AND INFLUENZA A/B PCR - Abnormal    Flu A Result Not Detected      Flu B Result Not Detected      Coronavirus 2019, PCR Detected (*)     Narrative:     This assay has received FDA Emergency Use Authorization (EUA) and  is only authorized for the duration of time that circumstances exist to justify the authorization of the emergency use of in vitro diagnostic tests for the detection of SARS-CoV-2 virus and/or diagnosis of COVID-19 infection under section 564(b)(1) of the Act, 21 U.S.C. 360bbb-3(b)(1). Testing for SARS-CoV-2 is only recommended for patients who meet current clinical and/or epidemiological criteria as defined by federal, state, or local public health directives. This assay is an in vitro diagnostic nucleic acid amplification test for the qualitative detection of SARS-CoV-2, Influenza A,  and Influenza B from nasopharyngeal specimens and has been validated for use at [facility]. Negative results do not preclude COVID-19 infections or Influenza A/B infections, and should not be used as the sole basis for diagnosis, treatment, or other management decisions. If Influenza A/B and RSV PCR results are negative, testing for Parainfluenza virus, Adenovirus and Metapneumovirus is routinely performed for [facility] in[facility], and is available on other patients by placing an add-on request.    LACTATE - Abnormal    Lactate 2.1 (*)     Narrative:     Venipuncture immediately after or during the administration of Metamizole may lead to falsely low results. Testing should be performed immediately  prior to Metamizole dosing.   MAGNESIUM - Normal    Magnesium 1.74     SERIAL TROPONIN-INITIAL - Normal    Troponin I, High Sensitivity 9      Narrative:     Less than 99th percentile of normal range cutoff-  Female and children under 18 years old <14 ng/L; Male <21 ng/L: Negative  Repeat testing should be performed if clinically indicated.     Female and children under 18 years old 14-50 ng/L; Male 21-50 ng/L:  Consistent with possible cardiac damage and possible increased clinical   risk. Serial measurements may help to assess extent of myocardial damage.     >50 ng/L: Consistent with cardiac damage, increased clinical risk and  myocardial infarction. Serial measurements may help assess extent of   myocardial damage.      NOTE: Children less than 1 year old may have higher baseline troponin   levels and results should be interpreted in conjunction with the overall   clinical context.     NOTE: Troponin I testing is performed using a different   testing methodology at [facility] than at other   [facility]. Direct result comparisons should only   be made within the same method.   SERIAL TROPONIN, 1 HOUR - Normal    Troponin I, High Sensitivity 9       Narrative:     Less than 99th percentile of normal range cutoff-  Female and children under 18 years old <14 ng/L; Male <21 ng/L: Negative  Repeat testing should be performed if clinically indicated.     Female and children under 18 years old 14-50 ng/L; Male 21-50 ng/L:  Consistent with possible cardiac damage and possible increased clinical   risk. Serial measurements may help to assess extent of myocardial damage.     >50 ng/L: Consistent with cardiac damage, increased clinical risk and  myocardial infarction. Serial measurements may help assess extent of   myocardial damage.      NOTE: Children less than 1 year old may have higher baseline troponin   levels and results should be interpreted in conjunction with the overall   clinical context.     NOTE: Troponin I testing is performed using a different   testing methodology at [facility] than at other   [facility]. Direct result comparisons should only   be made within the same method.   LIPASE - Normal    Lipase 49      Narrative:     Venipuncture immediately after or during the administration of Metamizole may lead to falsely low results. Testing should be performed immediately prior to Metamizole dosing.   BLOOD CULTURE   BLOOD CULTURE   TROPONIN SERIES- (INITIAL, 1 HR)    Narrative:     The following orders were created for panel order Troponin I Series, High Sensitivity (0, 1 HR).  Procedure                               Abnormality         Status                     ---------                               -----------         ------                     Troponin I, High Sensiti...[[id number]]  Normal              Final result               Troponin, High Sensitivi...[[id number]]  Normal              Final result                 Please view results for these tests on the individual orders.   URINALYSIS WITH REFLEX CULTURE AND MICROSCOPIC    Narrative:     The following orders were created for panel order Urinalysis with Reflex Culture and  Microscopic.  Procedure                               Abnormality         Status                     ---------                               -----------         ------                     Urinalysis with Reflex C...[[id number]]                                                 Extra Urine Gray Tube[[id number]]                                                         Please view results for these tests on the individual orders.   URINALYSIS WITH REFLEX CULTURE AND MICROSCOPIC   EXTRA URINE GRAY TUBE        Medical Decision Making  EKG interpreted by ED physician: Atrial fibrillation with rapid ventricular response rate of 124.  QRS and QTc within normal range.  No significant ST elevations or depressions.  No significant Q waves.  Poor R wave progression.  Normal axis.    EKG interpreted by ED physician: Atrial fibrillation with rapid ventricular response rate of 122.  QRS QTc within normal range.  No significant ST elevations or depressions.  No significant Q waves.  Poor R wave progression.  Normal axis.    58-year-old female presents emergency department with multiple complaints including cough, shortness of breath, dizziness/balance problems, nausea, vomiting, chest tightness, diarrhea, and upper abdominal pain as well as subjective fevers.  Given her multiple complaints a thorough workup was obtained.  Patient is appreciated to be in atrial fibrillation which is chronic for her, but she is not in RVR.  Patient treated with IV fluids as I suspect the tachycardia is compensating for her dehydration.  Lipase within normal range I do not suspect pancreatitis.  Cardiac enzymes x 2 and EKG showed no findings of acute ACS.  Patient does not have significantly elevated heart score.  INR is consistent with the patient's anticoagulation.  BNP is elevated, but this is with chronic kidney disease.  Chest x-ray does not show pulmonary edema.  I do not suspect obvious heart failure.  COVID testing is positive and this is known to  the patient she states she tested positive around Thanksgiving.  Flu testing negative.  CBC shows leukocytosis septic workup obtained including blood cultures and urine cultures though no bacterial source of infection is appreciated.  Urine studies are pending.  I suspect elevated WBCs may be reactive in nature due to the patient's nausea and vomiting today.  CT abdomen pelvis shows findings of duodenitis and large renal lesions left greater than right.  Patient advised of duodenitis.  CT head obtained given patient's reported dizziness shows no acute intracranial hemorrhage or mass effect.  However, findings of lacunar and right basal ganglia as well as left thalamus infarcts are appreciated.  These appear to be chronic in nature.  Advised patient of these findings and she denies knowledge of this in the past.  Patient is not given aspirin as she is already on anticoagulant.  Chest x-ray shows no acute cardiopulmonary process.  Given findings concerning for ischemic stroke that is new to the patient as well as her symptoms I do feel she would benefit from admission for further evaluation and treatment.  Case discussed with hospitalist on-call who accepted patient for admission.         Diagnoses as of 01/03/24 0138   Nausea and vomiting, unspecified vomiting type   Duodenitis   Abnormal head CT   Lesion of adrenal gland (CMS/HCC)   Atrial fibrillation with RVR (CMS/HCC)      1. Nausea and vomiting, unspecified vomiting type        2. Duodenitis        3. Abnormal head CT        4. Lesion of adrenal gland (CMS/HCC)        5. Atrial fibrillation with RVR (CMS/HCC)           Procedures     This note was dictated using dragon software and may contain errors related to dictation interpretation errors.      kAin Sullivan, DO  01/03/24 0138

## 2024-01-03 NOTE — CONSULTS
Department of Internal Medicine  Gastroenterology  Consult note      Reason for Consult: Intractable nausea and vomiting with possible duodenitis on CT    Chief Complaint: Nausea/vomiting and upper abdominal pain    History Obtained from: Patient    History of Present Illness      The patient is a 58 y.o. female with signficant past medical history of hypothyroidism status post resection of papillary thyroid cancer, hypertension, A-fib, and class III obesity  who presents for nausea and vomiting.    Patient reports being started on Ozempic approximately 3 weeks ago for weight loss.  Patient reports nausea and intermittent upper abdominal pain since starting new medication.  However, she came to the ER after she started vomiting yesterday with worsening upper abdominal pain described as twisting and squeezing.  Patient reports 2 episodes of vomiting yesterday, none since.  No hematemesis.  No dysphagia.  No regular NSAID use.  Denies marijuana use.  Denies GERD symptoms.  Denies EtOH.  Denies constipation or diarrhea.  Denies unintentional weight loss.  No hematochezia or melena.  Denies chest pain or shortness of breath.  Denies fever/chills.    Patient is overall clinically improved today.  tolerating clear liquids with no further vomiting or worsening abdominal pain, but still reports some nausea.  No abdominal pain at time of visit today.    Patient still testing positive for COVID from initial diagnosis in early December.  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    Allergies  Lisinopril    Current Medication    Current Facility-Administered Medications:     acetaminophen (Tylenol) tablet 650 mg, 650 mg, oral, q4h PRN, 650 mg at 01/03/24 0521 **OR** acetaminophen (Tylenol) oral liquid 650 mg, 650 mg, nasogastric tube, q4h PRN **OR** acetaminophen (Tylenol) suppository 650 mg, 650 mg, rectal, q4h PRN, Valdo Landers MD    apixaban (Eliquis)  tablet 5 mg, 5 mg, oral, q12h, Valdo Landers MD, 5 mg at 01/03/24 0737    atorvastatin (Lipitor) tablet 40 mg, 40 mg, oral, Daily, Valdo Landers MD    cyanocobalamin (Vitamin B-12) tablet 1,000 mcg, 1,000 mcg, oral, Daily, Valdo Landers MD, 1,000 mcg at 01/03/24 0737    dextrose 10 % in water (D10W) infusion, 0.3 g/kg/hr, intravenous, Once PRN, NED Manning-CNP    dextrose 50 % injection 25 g, 25 g, intravenous, q15 min PRN, NED Manning-CNP    folic acid (Folvite) tablet 1 mg, 1 mg, oral, Daily, Valdo Landers MD, 1 mg at 01/03/24 0736    gabapentin (Neurontin) capsule 100 mg, 100 mg, oral, Nightly, Valdo Landers MD    glucagon (Glucagen) injection 1 mg, 1 mg, intramuscular, q15 min PRN, Chantel Michel, NED-CNP    [Held by provider] hydroCHLOROthiazide (HYDRODiuril) tablet 12.5 mg, 12.5 mg, oral, Daily, Valdo Landers MD    lactated Ringer's infusion, 75 mL/hr, intravenous, Continuous, Valdo Landers MD, Last Rate: 75 mL/hr at 01/03/24 0521, 75 mL/hr at 01/03/24 0521    levothyroxine (Synthroid, Levoxyl) tablet 175 mcg, 175 mcg, oral, Daily before breakfast, Valdo Landers MD, 175 mcg at 01/03/24 0737    [Held by provider] losartan (Cozaar) tablet 25 mg, 25 mg, oral, Daily, Valdo Landers MD    [Held by provider] metoprolol tartrate (Lopressor) tablet 100 mg, 100 mg, oral, BID, Valdo Landers MD    ondansetron (Zofran) injection 4 mg, 4 mg, intravenous, q6h PRN, Valdo Landers MD, 4 mg at 01/03/24 0741    [START ON 1/4/2024] pantoprazole (ProtoNix) injection 40 mg, 40 mg, intravenous, Daily, Esther Hebert, APRN-CNP    promethazine (Phenergan) 12.5 mg in sodium chloride 0.9% 50 mL IV, 12.5 mg, intravenous, q6h PRN, NED Manning-CNP    pyridoxine (Vitamin B-6) tablet 100 mg, 100 mg, oral, Nightly, Valdo Landers MD    sennosides (Senokot) tablet 17.2 mg, 2 tablet, oral, Nightly PRN, Valdo Landers MD    Past Medical History  Active Ambulatory Problems     Diagnosis Date Noted    No Active  "Ambulatory Problems     Resolved Ambulatory Problems     Diagnosis Date Noted    No Resolved Ambulatory Problems     Past Medical History:   Diagnosis Date    A-fib (CMS/HCC)     Arthritis     Cancer (CMS/HCC)     Disease of thyroid gland     Hypertension        Past Surgical History  Cholecystectomy, tonsillectomy     Family History  No family history on file.  No family history of stomach or colon cancer    Social History  TOBACCO:  reports that she has never smoked. She has never been exposed to tobacco smoke. She has never used smokeless tobacco.  ETOH:  has no history on file for alcohol use.  DRUGS:  has no history on file for drug use.  MARITAL STATUS:   OCCUPATION:    Review of Systems  All 10 systems reviewed with the patient/family and negative except for what is mentioned in HPI      PHYSICAL EXAM  VS: BP 98/52   Pulse 93   Temp 35.6 °C (96.1 °F)   Resp 20   Ht 1.676 m (5' 6\")   Wt 148 kg (325 lb 9.9 oz)   SpO2 91%   BMI 52.56 kg/m²  Body mass index is 52.56 kg/m².  Physical Exam  Vitals reviewed.   Constitutional:       General: She is not in acute distress.     Appearance: She is obese.   HENT:      Head: Normocephalic.      Nose: Nose normal.      Mouth/Throat:      Mouth: Mucous membranes are moist.      Pharynx: Oropharynx is clear.   Eyes:      Extraocular Movements: Extraocular movements intact.      Conjunctiva/sclera: Conjunctivae normal.      Pupils: Pupils are equal, round, and reactive to light.   Cardiovascular:      Rate and Rhythm: Normal rate and regular rhythm.      Pulses: Normal pulses.      Heart sounds: Normal heart sounds.   Pulmonary:      Effort: Pulmonary effort is normal.      Breath sounds: Normal breath sounds.   Abdominal:      General: Bowel sounds are normal.      Palpations: Abdomen is soft.      Tenderness: There is no abdominal tenderness. There is no guarding or rebound.   Musculoskeletal:         General: Normal range of motion.      Cervical back: Normal range " of motion.   Skin:     General: Skin is warm and dry.   Neurological:      General: No focal deficit present.      Mental Status: She is alert and oriented to person, place, and time.   Psychiatric:         Mood and Affect: Mood normal.         Behavior: Behavior normal.          DATA  Recent blood work and relevant radiology and endoscopic studies were reviewed and discussed with the patient   Results from last 7 days   Lab Units 01/03/24  0437   WBC AUTO x10*3/uL 20.6*   RBC AUTO x10*6/uL 4.81   HEMOGLOBIN g/dL 15.7   HEMATOCRIT % 46.6*   MCV fL 97   MCHC g/dL 33.7   RDW % 14.2   PLATELETS AUTO x10*3/uL 213       Results from last 72 hours   Lab Units 01/03/24  0437 01/02/24  1920   SODIUM mmol/L 136 138   POTASSIUM mmol/L 3.7 4.0   CHLORIDE mmol/L 98 98   CO2 mmol/L 27 28   BUN mg/dL 31* 32*   CREATININE mg/dL 1.64* 1.63*   CALCIUM mg/dL 8.8 9.8   PROTEIN TOTAL g/dL  --  7.6   BILIRUBIN TOTAL mg/dL  --  1.4*   ALK PHOS U/L  --  95   AST U/L  --  29   ALT U/L  --  28       Results from last 72 hours   Lab Units 01/02/24  1920   INR  1.6*       Results from last 72 hours   Lab Units 01/02/24 2005   LIPASE U/L 49       RADIOLOGY REVIEW  CT A/P 1/2/2024:  IMPRESSION:  1. Mild stranding about the descending duodenum. Clinical correlation  for signs of duodenitis recommended. Acute pancreatitis considered  less likely but not excluded.  2. Bilateral adrenal lesions, LEFT greater than RIGHT. These are  considered indeterminate. Follow-up multiphase CT or MRI with chemical  shift imaging recommended for further assessment.  3. Air within urinary bladder. Clinical correlation for recent  catheterization recommended.    ENDOSCOPIC REVIEW  No prior EGD    Prior colonoscopy indicated for screening about 2 years ago at [facility] with polyps removed reportedly per patient        IMPRESSION/RECOMMENDATIONS  The patient is a 58 y.o. female with signficant past medical history of hypothyroidism status post resection of papillary  thyroid cancer, hypertension, A-fib, and class III obesity  who presents for nausea and vomiting.    Patient reports being started on Ozempic approximately 3 weeks ago for weight loss.  Patient reports nausea and intermittent upper abdominal pain since starting new medication.  However, she came to the ER after she started vomiting yesterday with worsening upper abdominal pain described as twisting and squeezing.  Patient reports 2 episodes of vomiting yesterday, none since.        #Persistent nausea and intermittent upper abdominal pain with only 2 episodes of vomiting yesterday possibly 2/2 new medication Ozempic vs duodenitis on CT.  Tolerating clear liquids today with some nausea but no further vomiting and no abdominal pain today.  No overt GI bleeding.  No unintentional weight loss or dysphagia. No mj.  Lipase WNL.  LFTs WNL except for TB 1.4.  Mild biliary dilation on CT which is often seen s/p cholecystectomy  #possible duodenitis on CT could also be contributing to GI symptoms  # History of A-fib-on Eliquis  #positive for COVID early December, still testing positive        Plan  -No emergent/urgent indication for endoscopic evaluation at this time and patient prefers to hold off on EGD as well. Can likely discuss EGD outpatient if patient continues to improve  -Continue PPI 40 mg daily   -avoid NSAIDs  -advance diet as tolerated  -Continue supportive care per primary team              Plan discussed with Dr. Russell.  GI will continue to follow.  Total of 60 minutes spent on visit and overall professional care of the patient  (Electronically signed byRAMONA Valera on 1/3/2024 at 2:13 PM)     Inpatient consult to Gastroenterology  Consult performed by: RAMONA Valera  Consult ordered by: RAMONA Manning

## 2024-01-03 NOTE — PROGRESS NOTES
01/03/24 1613   Discharge Planning   Living Arrangements Spouse/significant other   Support Systems Spouse/significant other   Assistance Needed none, declines needs   Type of Residence Private residence   Home or Post Acute Services None   Patient expects to be discharged to: HOME   Does the patient need discharge transport arranged? No   Transportation Needs   In the past 12 months, has lack of transportation kept you from medical appointments or from getting medications? no   In the past 12 months, has lack of transportation kept you from meetings, work, or from getting things needed for daily living? No     Met with patient.  She is independent , resides with spouse and declines any home going needs.  Plan is for possible dc tomorrow.  Care transitions will continue to follow if needs should arise.

## 2024-01-04 ENCOUNTER — APPOINTMENT (OUTPATIENT)
Dept: CARDIOLOGY | Facility: HOSPITAL | Age: 59
DRG: 392 | End: 2024-01-04
Payer: COMMERCIAL

## 2024-01-04 LAB
ANION GAP SERPL CALC-SCNC: 13 MMOL/L (ref 10–20)
AORTIC VALVE MEAN GRADIENT: 3
AORTIC VALVE PEAK VELOCITY: 1.1
AV PEAK GRADIENT: 4.8
AVA (PEAK VEL): 2.47
AVA (VTI): 2.3
BASOPHILS # BLD AUTO: 0.06 X10*3/UL (ref 0–0.1)
BASOPHILS NFR BLD AUTO: 0.4 %
BUN SERPL-MCNC: 27 MG/DL (ref 6–23)
CALCIUM SERPL-MCNC: 8.9 MG/DL (ref 8.6–10.3)
CHLORIDE SERPL-SCNC: 98 MMOL/L (ref 98–107)
CO2 SERPL-SCNC: 26 MMOL/L (ref 21–32)
CREAT SERPL-MCNC: 1.53 MG/DL (ref 0.5–1.05)
EJECTION FRACTION APICAL 4 CHAMBER: 60.6
EJECTION FRACTION: 58
EOSINOPHIL # BLD AUTO: 0.01 X10*3/UL (ref 0–0.7)
EOSINOPHIL NFR BLD AUTO: 0.1 %
ERYTHROCYTE [DISTWIDTH] IN BLOOD BY AUTOMATED COUNT: 14 % (ref 11.5–14.5)
GFR SERPL CREATININE-BSD FRML MDRD: 40 ML/MIN/1.73M*2
GLUCOSE SERPL-MCNC: 133 MG/DL (ref 74–99)
HCT VFR BLD AUTO: 45 % (ref 36–46)
HGB BLD-MCNC: 15.2 G/DL (ref 12–16)
HOLD SPECIMEN: NORMAL
IMM GRANULOCYTES # BLD AUTO: 0.06 X10*3/UL (ref 0–0.7)
IMM GRANULOCYTES NFR BLD AUTO: 0.4 % (ref 0–0.9)
LEFT ATRIUM VOLUME AREA LENGTH INDEX BSA: 30.6
LEFT VENTRICLE INTERNAL DIMENSION DIASTOLE: 4.31 (ref 3.5–6)
LEFT VENTRICULAR OUTFLOW TRACT DIAMETER: 2.1
LYMPHOCYTES # BLD AUTO: 1.31 X10*3/UL (ref 1.2–4.8)
LYMPHOCYTES NFR BLD AUTO: 8.9 %
MAGNESIUM SERPL-MCNC: 1.81 MG/DL (ref 1.6–2.4)
MCH RBC QN AUTO: 33 PG (ref 26–34)
MCHC RBC AUTO-ENTMCNC: 33.8 G/DL (ref 32–36)
MCV RBC AUTO: 98 FL (ref 80–100)
MONOCYTES # BLD AUTO: 0.78 X10*3/UL (ref 0.1–1)
MONOCYTES NFR BLD AUTO: 5.3 %
NEUTROPHILS # BLD AUTO: 12.43 X10*3/UL (ref 1.2–7.7)
NEUTROPHILS NFR BLD AUTO: 84.9 %
NRBC BLD-RTO: 0 /100 WBCS (ref 0–0)
PLATELET # BLD AUTO: 167 X10*3/UL (ref 150–450)
POTASSIUM SERPL-SCNC: 3.7 MMOL/L (ref 3.5–5.3)
RBC # BLD AUTO: 4.6 X10*6/UL (ref 4–5.2)
RIGHT VENTRICLE FREE WALL PEAK S': 12.5
RIGHT VENTRICLE PEAK SYSTOLIC PRESSURE: 28.4
SODIUM SERPL-SCNC: 133 MMOL/L (ref 136–145)
TRICUSPID ANNULAR PLANE SYSTOLIC EXCURSION: 2.3
WBC # BLD AUTO: 14.7 X10*3/UL (ref 4.4–11.3)

## 2024-01-04 PROCEDURE — 93010 ELECTROCARDIOGRAM REPORT: CPT | Performed by: INTERNAL MEDICINE

## 2024-01-04 PROCEDURE — 2500000004 HC RX 250 GENERAL PHARMACY W/ HCPCS (ALT 636 FOR OP/ED): Performed by: STUDENT IN AN ORGANIZED HEALTH CARE EDUCATION/TRAINING PROGRAM

## 2024-01-04 PROCEDURE — 2500000001 HC RX 250 WO HCPCS SELF ADMINISTERED DRUGS (ALT 637 FOR MEDICARE OP)

## 2024-01-04 PROCEDURE — 80048 BASIC METABOLIC PNL TOTAL CA: CPT | Performed by: NURSE PRACTITIONER

## 2024-01-04 PROCEDURE — 2500000001 HC RX 250 WO HCPCS SELF ADMINISTERED DRUGS (ALT 637 FOR MEDICARE OP): Performed by: NURSE PRACTITIONER

## 2024-01-04 PROCEDURE — 93306 TTE W/DOPPLER COMPLETE: CPT

## 2024-01-04 PROCEDURE — C9113 INJ PANTOPRAZOLE SODIUM, VIA: HCPCS | Performed by: NURSE PRACTITIONER

## 2024-01-04 PROCEDURE — 99291 CRITICAL CARE FIRST HOUR: CPT | Performed by: NURSE PRACTITIONER

## 2024-01-04 PROCEDURE — 93005 ELECTROCARDIOGRAM TRACING: CPT

## 2024-01-04 PROCEDURE — 93306 TTE W/DOPPLER COMPLETE: CPT | Performed by: INTERNAL MEDICINE

## 2024-01-04 PROCEDURE — 2500000004 HC RX 250 GENERAL PHARMACY W/ HCPCS (ALT 636 FOR OP/ED)

## 2024-01-04 PROCEDURE — 36415 COLL VENOUS BLD VENIPUNCTURE: CPT | Performed by: STUDENT IN AN ORGANIZED HEALTH CARE EDUCATION/TRAINING PROGRAM

## 2024-01-04 PROCEDURE — 2500000005 HC RX 250 GENERAL PHARMACY W/O HCPCS: Performed by: NURSE PRACTITIONER

## 2024-01-04 PROCEDURE — 99232 SBSQ HOSP IP/OBS MODERATE 35: CPT | Performed by: NURSE PRACTITIONER

## 2024-01-04 PROCEDURE — 85025 COMPLETE CBC W/AUTO DIFF WBC: CPT | Performed by: STUDENT IN AN ORGANIZED HEALTH CARE EDUCATION/TRAINING PROGRAM

## 2024-01-04 PROCEDURE — 2500000004 HC RX 250 GENERAL PHARMACY W/ HCPCS (ALT 636 FOR OP/ED): Performed by: NURSE PRACTITIONER

## 2024-01-04 PROCEDURE — 2020000001 HC ICU ROOM DAILY

## 2024-01-04 PROCEDURE — 83735 ASSAY OF MAGNESIUM: CPT | Performed by: NURSE PRACTITIONER

## 2024-01-04 RX ORDER — METOPROLOL TARTRATE 1 MG/ML
5 INJECTION, SOLUTION INTRAVENOUS ONCE
Status: DISCONTINUED | OUTPATIENT
Start: 2024-01-04 | End: 2024-01-04

## 2024-01-04 RX ORDER — METOPROLOL TARTRATE 50 MG/1
100 TABLET ORAL 2 TIMES DAILY
Status: DISCONTINUED | OUTPATIENT
Start: 2024-01-04 | End: 2024-01-08 | Stop reason: HOSPADM

## 2024-01-04 RX ORDER — POLYETHYLENE GLYCOL 3350 17 G/17G
17 POWDER, FOR SOLUTION ORAL DAILY PRN
Status: DISCONTINUED | OUTPATIENT
Start: 2024-01-04 | End: 2024-01-08 | Stop reason: HOSPADM

## 2024-01-04 RX ORDER — DOCUSATE SODIUM 100 MG/1
100 CAPSULE, LIQUID FILLED ORAL 2 TIMES DAILY
Status: DISCONTINUED | OUTPATIENT
Start: 2024-01-04 | End: 2024-01-08 | Stop reason: HOSPADM

## 2024-01-04 RX ORDER — PANTOPRAZOLE SODIUM 40 MG/1
40 TABLET, DELAYED RELEASE ORAL
Status: DISCONTINUED | OUTPATIENT
Start: 2024-01-05 | End: 2024-01-08 | Stop reason: HOSPADM

## 2024-01-04 RX ORDER — MAGNESIUM SULFATE HEPTAHYDRATE 40 MG/ML
2 INJECTION, SOLUTION INTRAVENOUS ONCE
Status: COMPLETED | OUTPATIENT
Start: 2024-01-04 | End: 2024-01-04

## 2024-01-04 RX ORDER — AMOXICILLIN AND CLAVULANATE POTASSIUM 875; 125 MG/1; MG/1
875 TABLET, FILM COATED ORAL EVERY 12 HOURS SCHEDULED
Status: DISCONTINUED | OUTPATIENT
Start: 2024-01-04 | End: 2024-01-06

## 2024-01-04 RX ORDER — POTASSIUM CHLORIDE 20 MEQ/1
40 TABLET, EXTENDED RELEASE ORAL ONCE
Status: COMPLETED | OUTPATIENT
Start: 2024-01-04 | End: 2024-01-04

## 2024-01-04 RX ORDER — METOPROLOL TARTRATE 1 MG/ML
INJECTION, SOLUTION INTRAVENOUS
Status: DISCONTINUED
Start: 2024-01-04 | End: 2024-01-04 | Stop reason: WASHOUT

## 2024-01-04 RX ADMIN — METOPROLOL TARTRATE 100 MG: 50 TABLET, FILM COATED ORAL at 20:51

## 2024-01-04 RX ADMIN — CYANOCOBALAMIN TAB 500 MCG 1000 MCG: 500 TAB at 08:06

## 2024-01-04 RX ADMIN — Medication: at 03:58

## 2024-01-04 RX ADMIN — ACETAMINOPHEN 650 MG: 325 TABLET ORAL at 19:00

## 2024-01-04 RX ADMIN — PERFLUTREN 2 ML OF DILUTION: 6.52 INJECTION, SUSPENSION INTRAVENOUS at 08:05

## 2024-01-04 RX ADMIN — ACETAMINOPHEN 650 MG: 325 TABLET ORAL at 08:17

## 2024-01-04 RX ADMIN — ONDANSETRON 4 MG: 2 INJECTION INTRAMUSCULAR; INTRAVENOUS at 03:16

## 2024-01-04 RX ADMIN — GABAPENTIN 100 MG: 100 CAPSULE ORAL at 20:34

## 2024-01-04 RX ADMIN — AMPICILLIN SODIUM AND SULBACTAM SODIUM 3 G: 2; 1 INJECTION, POWDER, FOR SOLUTION INTRAMUSCULAR; INTRAVENOUS at 06:58

## 2024-01-04 RX ADMIN — APIXABAN 5 MG: 5 TABLET, FILM COATED ORAL at 08:08

## 2024-01-04 RX ADMIN — AMIODARONE HYDROCHLORIDE 1 MG/MIN: 1.8 INJECTION, SOLUTION INTRAVENOUS at 15:00

## 2024-01-04 RX ADMIN — LEVOTHYROXINE SODIUM 175 MCG: 125 TABLET ORAL at 06:31

## 2024-01-04 RX ADMIN — APIXABAN 5 MG: 5 TABLET, FILM COATED ORAL at 17:09

## 2024-01-04 RX ADMIN — Medication 100 MG: at 20:52

## 2024-01-04 RX ADMIN — SODIUM CHLORIDE, POTASSIUM CHLORIDE, SODIUM LACTATE AND CALCIUM CHLORIDE 75 ML/HR: 600; 310; 30; 20 INJECTION, SOLUTION INTRAVENOUS at 05:16

## 2024-01-04 RX ADMIN — ATORVASTATIN CALCIUM 40 MG: 20 TABLET, FILM COATED ORAL at 08:07

## 2024-01-04 RX ADMIN — AMOXICILLIN AND CLAVULANATE POTASSIUM 875 MG: 875; 125 TABLET, FILM COATED ORAL at 12:36

## 2024-01-04 RX ADMIN — AMOXICILLIN AND CLAVULANATE POTASSIUM 875 MG: 875; 125 TABLET, FILM COATED ORAL at 20:34

## 2024-01-04 RX ADMIN — ACETAMINOPHEN 650 MG: 325 TABLET ORAL at 12:42

## 2024-01-04 RX ADMIN — ACETAMINOPHEN 650 MG: 325 TABLET ORAL at 01:55

## 2024-01-04 RX ADMIN — PANTOPRAZOLE SODIUM 40 MG: 40 INJECTION, POWDER, FOR SOLUTION INTRAVENOUS at 08:06

## 2024-01-04 RX ADMIN — ONDANSETRON 4 MG: 2 INJECTION INTRAMUSCULAR; INTRAVENOUS at 08:15

## 2024-01-04 RX ADMIN — FOLIC ACID 1 MG: 1 TABLET ORAL at 08:06

## 2024-01-04 RX ADMIN — AMIODARONE HYDROCHLORIDE 1 MG/MIN: 1.8 INJECTION, SOLUTION INTRAVENOUS at 09:43

## 2024-01-04 RX ADMIN — MAGNESIUM SULFATE HEPTAHYDRATE 2 G: 40 INJECTION, SOLUTION INTRAVENOUS at 05:15

## 2024-01-04 RX ADMIN — POTASSIUM CHLORIDE 40 MEQ: 1500 TABLET, EXTENDED RELEASE ORAL at 09:11

## 2024-01-04 ASSESSMENT — COGNITIVE AND FUNCTIONAL STATUS - GENERAL
DAILY ACTIVITIY SCORE: 24
WALKING IN HOSPITAL ROOM: A LITTLE
CLIMB 3 TO 5 STEPS WITH RAILING: A LITTLE
MOVING TO AND FROM BED TO CHAIR: A LITTLE
MOBILITY SCORE: 20
MOBILITY SCORE: 24
STANDING UP FROM CHAIR USING ARMS: A LITTLE

## 2024-01-04 ASSESSMENT — PAIN SCALES - GENERAL
PAINLEVEL_OUTOF10: 5 - MODERATE PAIN
PAINLEVEL_OUTOF10: 4
PAINLEVEL_OUTOF10: 2

## 2024-01-04 ASSESSMENT — PAIN DESCRIPTION - DESCRIPTORS
DESCRIPTORS: ACHING
DESCRIPTORS: ACHING

## 2024-01-04 NOTE — SIGNIFICANT EVENT
Rapid response called at approximately 03:20.  Patient in A-fib with RVR at time of arrival.  Heart rates in the 160s to 180s.  Hypotensive to 80s over 40s.  Patient to be loaded with amiodarone and transferred to ICU for continued care.

## 2024-01-04 NOTE — H&P
[facility] Critical Care Medicine       Date:  1/4/2024  Patient:  Mateo Enriquez  YOB: 1965  MRN:  [id number]   Admit Date:  1/2/2024      Chief Complaint   Patient presents with    Vomiting     Having fever, nausea, vomiting. Had COVID on thanksgiving, recently started ozempic.      History of Present Illness:  Mateo Enriquez is a 58 y.o. year old female patient with Past Medical History of   hypothyroidism status post resection of papillary thyroid cancer, hypertension, A-fib,  class III obesity, and COVID (dx 12/1/23) who presents to hospital due to concerns of intractable nausea and vomiting.  Patient was started on Ozempic approximately 3 weeks ago.  States she has been tolerating well and took her last dose of medication on 12/31.  Reports she began to experience episodes of emesis on 1/1.  Describes emesis as recent food intake as well as dinner from the night before.  Experienced continued bouts of vomiting which prompted her to present to the ED for evaluation.  Patient states she has been persistently sick since the beginning of December when she tested positive for COVID.       Interval ICU Events:  Patient in afib RVR with HR >180's with hypotension, diaphoresis, dizziness and palpitations. RR called by bedside RN. Patient bolused with IV amiodarone and started on drip. Transferred to ICU.     Medical History:  Past Medical History:   Diagnosis Date    A-fib (CMS/HCC)     Arthritis     Cancer (CMS/HCC)     Disease of thyroid gland     Hypertension      No past surgical history on file.    Medications Prior to Admission   Medication Sig Dispense Refill Last Dose    apixaban (Eliquis) 5 mg tablet Take 1 tablet (5 mg) by mouth 2 times a day.   1/2/2024    aspirin 81 mg EC tablet Take 1 tablet (81 mg) by mouth once daily.   1/2/2024    atorvastatin (Lipitor) 40 mg tablet Take 1 tablet (40 mg) by mouth once daily.   1/2/2024    cyanocobalamin (Vitamin B-12) 1,000 mcg tablet Take 1 tablet  (1,000 mcg) by mouth once daily.   1/2/2024    folic acid (Folvite) 800 mcg tablet Take 1 tablet (0.8 mg) by mouth once daily. Wednesday's and Sundays takes 2 tablets   1/2/2024    gabapentin (Neurontin) 100 mg capsule Take 1 capsule (100 mg) by mouth once daily at bedtime.   1/2/2024    hydroCHLOROthiazide (HYDRODiuril) 12.5 mg tablet Take 1 tablet (12.5 mg) by mouth once daily.   1/2/2024    levothyroxine (Synthroid, Levoxyl) 175 mcg tablet Take 1 tablet (175 mcg) by mouth once daily in the morning. Take before meals.   1/2/2024    losartan (Cozaar) 25 mg tablet Take 1 tablet (25 mg) by mouth once daily.   1/2/2024    metoprolol tartrate (Lopressor) 100 mg tablet Take 1 tablet (100 mg) by mouth 2 times a day.   1/2/2024    pyridoxine (Vitamin B-6) 100 mg tablet Take 1 tablet (100 mg) by mouth once daily at bedtime.   1/2/2024    semaglutide (Ozempic) 0.25 mg or 0.5 mg (2 mg/3 mL) pen injector Inject 0.5 mg under the skin every 7 days. Patient took last dose on Sunday   Past Week     Review of systems: 10 point review of systems is otherwise negative except as mentioned above.    Physical exam:  General: Obese adult female in mild distress  HEENT: Clear sclera, EOMI, trachea midline, moist mucous membranes  Respiratory: Lungs clear to auscultation, with no wheezes or rhonchi appreciated  Cardiovascular: S1 and S2 auscultated, tachycardic, irregularly irregular  Abdomen: Soft, minimally tender to palpation, nondistended  Extremities: No cyanosis or clubbing appreciated  Neurological: Spontaneously moves all extremities, no dysarthria, cranial nerves grossly intact  Psychiatric: Appropriate mood and affect  Skin: Warm, dry, BLE cellulitis     Vitals:    01/04/24 0318   BP: 119/54   Pulse: (!) 160   Resp: 18   Temp:    SpO2:      Results for orders placed or performed during the hospital encounter of 01/02/24 (from the past 24 hour(s))   SST TOP   Result Value Ref Range    Extra Tube Hold for add-ons.    Procalcitonin    Result Value Ref Range    Procalcitonin 4.37 (H) <=0.07 ng/mL   Magnesium   Result Value Ref Range    Magnesium 1.70 1.60 - 2.40 mg/dL   Basic Metabolic Panel   Result Value Ref Range    Glucose 146 (H) 74 - 99 mg/dL    Sodium 136 136 - 145 mmol/L    Potassium 3.7 3.5 - 5.3 mmol/L    Chloride 98 98 - 107 mmol/L    Bicarbonate 27 21 - 32 mmol/L    Anion Gap 15 10 - 20 mmol/L    Urea Nitrogen 31 (H) 6 - 23 mg/dL    Creatinine 1.64 (H) 0.50 - 1.05 mg/dL    eGFR 36 (L) >60 mL/min/1.73m*2    Calcium 8.8 8.6 - 10.3 mg/dL   CBC and Auto Differential   Result Value Ref Range    WBC 20.6 (H) 4.4 - 11.3 x10*3/uL    nRBC 0.0 0.0 - 0.0 /100 WBCs    RBC 4.81 4.00 - 5.20 x10*6/uL    Hemoglobin 15.7 12.0 - 16.0 g/dL    Hematocrit 46.6 (H) 36.0 - 46.0 %    MCV 97 80 - 100 fL    MCH 32.6 26.0 - 34.0 pg    MCHC 33.7 32.0 - 36.0 g/dL    RDW 14.2 11.5 - 14.5 %    Platelets 213 150 - 450 x10*3/uL    Neutrophils % 88.9 40.0 - 80.0 %    Immature Granulocytes %, Automated 0.8 0.0 - 0.9 %    Lymphocytes % 5.2 13.0 - 44.0 %    Monocytes % 4.7 2.0 - 10.0 %    Eosinophils % 0.0 0.0 - 6.0 %    Basophils % 0.4 0.0 - 2.0 %    Neutrophils Absolute 18.30 (H) 1.20 - 7.70 x10*3/uL    Immature Granulocytes Absolute, Automated 0.16 0.00 - 0.70 x10*3/uL    Lymphocytes Absolute 1.07 (L) 1.20 - 4.80 x10*3/uL    Monocytes Absolute 0.97 0.10 - 1.00 x10*3/uL    Eosinophils Absolute 0.00 0.00 - 0.70 x10*3/uL    Basophils Absolute 0.08 0.00 - 0.10 x10*3/uL   TSH with reflex to Free T4 if abnormal   Result Value Ref Range    Thyroid Stimulating Hormone 2.07 0.44 - 3.98 mIU/L   Lactate   Result Value Ref Range    Lactate 1.3 0.4 - 2.0 mmol/L   Vascular US lower extremity venous duplex left   Result Value Ref Range    BSA 2.62 m2   CBC and Auto Differential   Result Value Ref Range    WBC 14.7 (H) 4.4 - 11.3 x10*3/uL    nRBC 0.0 0.0 - 0.0 /100 WBCs    RBC 4.60 4.00 - 5.20 x10*6/uL    Hemoglobin 15.2 12.0 - 16.0 g/dL    Hematocrit 45.0 36.0 - 46.0 %    MCV 98 80  - 100 fL    MCH 33.0 26.0 - 34.0 pg    MCHC 33.8 32.0 - 36.0 g/dL    RDW 14.0 11.5 - 14.5 %    Platelets 167 150 - 450 x10*3/uL    Neutrophils % 84.9 40.0 - 80.0 %    Immature Granulocytes %, Automated 0.4 0.0 - 0.9 %    Lymphocytes % 8.9 13.0 - 44.0 %    Monocytes % 5.3 2.0 - 10.0 %    Eosinophils % 0.1 0.0 - 6.0 %    Basophils % 0.4 0.0 - 2.0 %    Neutrophils Absolute 12.43 (H) 1.20 - 7.70 x10*3/uL    Immature Granulocytes Absolute, Automated 0.06 0.00 - 0.70 x10*3/uL    Lymphocytes Absolute 1.31 1.20 - 4.80 x10*3/uL    Monocytes Absolute 0.78 0.10 - 1.00 x10*3/uL    Eosinophils Absolute 0.01 0.00 - 0.70 x10*3/uL    Basophils Absolute 0.06 0.00 - 0.10 x10*3/uL     CT abdomen pelvis w IV contrast    Impression:     1. Mild stranding about the descending duodenum. Clinical correlation  for signs of duodenitis recommended. Acute pancreatitis considered  less likely but not excluded.  2. Bilateral adrenal lesions, LEFT greater than RIGHT. These are  considered indeterminate. Follow-up multiphase CT or MRI with chemical  shift imaging recommended for further assessment.  3. Air within urinary bladder. Clinical correlation for recent  catheterization recommended.  Signed by Barry Sim II, MD    CT head wo IV contrast      Impression: No evidence of acute intracranial hemorrhage or acute cortical  infarct. Mild to moderate chronic microvascular ischemic change with  chronic appearing lacunar infarcts in the right basal ganglia and  left thalamus.      XR chest 1 view  Narrative: Interpreted By:  Max Carrion,       Impression: 1.  No evidence of acute cardiopulmonary process.      Signed by: Max Carrion 1/2/2024 8:31 PM  Dictation workstation:   [id number]    Assessment/Plan:    I am currently managing this critically ill patient for the following problems:    #Afib RVR  #Hypotension  #Intractable N/V  #LLE Cellulitis     Neuro/Psych/Pain Ctrl/Sedation:  A & O x 4.     Respiratory/ENT:  On 3 liters NC. Wean as  tolerated.     Cardiovascular:  #AFIB RVR  #Hypotension  HR > 180's on PO and IV metoprolol   BP 80's/50's at time of transfer to ICU  Will give amiodarone bolus and follow with drip per protocol   Mag 2 grams IV x 1  BP improved with rate control  Monitor on telemetry  TSH is normal     GI:  #Intractable nausea and vomiting suspected to be d/t ozempic.   CT shows possible duodenitis   GI following  Clear liquid diet  Antiemtics PRN   IVF's for now   Protonix daily   Symptoms seem to have improved from admission     Renal/Volume Status (Intra & Extravascular):  #CKD stage III  Cr at baseline ~1.4  Cr today 1.64  Continue with IVF's and repeat BMP in am  Avoid nephrotoxic agents    Endocrine  #Adrenal lesions on CT  #Remote papillary thyroid cancer s/p thyroidectomy   #Hypothyroidism   2.9 cm left and 1 cm right adrenal nodule/presumed adenomas noted on CT A/P. Radiology advising follow-up multiphase CT or MRI w/ chemical shift recommended    Infectious Disease:  #Leukocytosis  #LLE cellulitis   #COVID  BLE venous duplex neg for DVT  WBC elevated with left shift and procalcitonin 4.37. Still unclear etiology but suspect possible LLE cellulitis, duodenitis. Start on Unasyn IV for now.   COVID dx early December, not steroid or remdesivir candidate     Heme/Onc:  monitor for s/s anemia  transfuse for hgb <7 or symptomatic  daily CBC    OBGYN/MSK:  PT/OT consult     Ethics/Code Status:  No needs     :  DVT Prophylaxis: On Eliquis   GI Prophylaxis: PPI  Bowel Regimen: None  Diet: Clear liquids  CVC: None  [place]: None  [place]: None  Restraints: None  Dispo: ICU    Critical Care Time:  40 minutes     Deisy Ragsdale, APRN-CNP

## 2024-01-04 NOTE — PROGRESS NOTES
[facility] Critical Care Medicine       Date:  1/4/2024  Patient:  Mateo Enriquez  YOB: 1965  MRN:  [id number]   Admit Date:  1/2/2024  ========================================================================================================    Chief Complaint   Patient presents with    Vomiting     Having fever, nausea, vomiting. Had COVID on thanksgiving, recently started ozempic.          History of Present Illness:  Mateo Enriquez is a 58 y.o. year old female patient with Past Medical History of   hypothyroidism status post resection of papillary thyroid cancer, hypertension, A-fib,  class III obesity, and COVID (dx 12/1/23) who presents to hospital due to concerns of intractable nausea and vomiting.  Patient was started on Ozempic approximately 3 weeks ago.  States she has been tolerating well and took her last dose of medication on 12/31.  Reports she began to experience episodes of emesis on 1/1.  Describes emesis as recent food intake as well as dinner from the night before.  Experienced continued bouts of vomiting which prompted her to present to the ED for evaluation.  Patient states she has been persistently sick since the beginning of December when she tested positive for COVID.       Interval ICU Events:  Patient in afib RVR with HR >180's with hypotension, diaphoresis, dizziness and palpitations. RR called by bedside RN. Patient bolused with IV amiodarone and started on drip. Transferred to ICU.      1/4: continue on amio gtt, awaiting cardiology recs.  HD stable now. LLE cellulitis starting to blister, DVT US negative.         Medical History:  Past Medical History:   Diagnosis Date    A-fib (CMS/HCC)     Arthritis     Cancer (CMS/HCC)     Disease of thyroid gland     Hypertension      No past surgical history on file.  Medications Prior to Admission   Medication Sig Dispense Refill Last Dose    apixaban (Eliquis) 5 mg tablet Take 1 tablet (5 mg) by mouth 2 times a day.   1/2/2024     aspirin 81 mg EC tablet Take 1 tablet (81 mg) by mouth once daily.   1/2/2024    atorvastatin (Lipitor) 40 mg tablet Take 1 tablet (40 mg) by mouth once daily.   1/2/2024    cyanocobalamin (Vitamin B-12) 1,000 mcg tablet Take 1 tablet (1,000 mcg) by mouth once daily.   1/2/2024    folic acid (Folvite) 800 mcg tablet Take 1 tablet (0.8 mg) by mouth once daily. Wednesday's and Sundays takes 2 tablets   1/2/2024    gabapentin (Neurontin) 100 mg capsule Take 1 capsule (100 mg) by mouth once daily at bedtime.   1/2/2024    hydroCHLOROthiazide (HYDRODiuril) 12.5 mg tablet Take 1 tablet (12.5 mg) by mouth once daily.   1/2/2024    levothyroxine (Synthroid, Levoxyl) 175 mcg tablet Take 1 tablet (175 mcg) by mouth once daily in the morning. Take before meals.   1/2/2024    losartan (Cozaar) 25 mg tablet Take 1 tablet (25 mg) by mouth once daily.   1/2/2024    metoprolol tartrate (Lopressor) 100 mg tablet Take 1 tablet (100 mg) by mouth 2 times a day.   1/2/2024    pyridoxine (Vitamin B-6) 100 mg tablet Take 1 tablet (100 mg) by mouth once daily at bedtime.   1/2/2024    semaglutide (Ozempic) 0.25 mg or 0.5 mg (2 mg/3 mL) pen injector Inject 0.5 mg under the skin every 7 days. Patient took last dose on Sunday   Past Week     Lisinopril  Social History     Tobacco Use    Smoking status: Never     Passive exposure: Never    Smokeless tobacco: Never   Vaping Use    Vaping Use: Never used     No family history on file.    Hospital Medications:    amiodarone, 1 mg/min, Last Rate: 1 mg/min (01/04/24 0943)          Current Facility-Administered Medications:     acetaminophen (Tylenol) tablet 650 mg, 650 mg, oral, q4h PRN, 650 mg at 01/04/24 1242 **OR** acetaminophen (Tylenol) oral liquid 650 mg, 650 mg, nasogastric tube, q4h PRN **OR** acetaminophen (Tylenol) suppository 650 mg, 650 mg, rectal, q4h PRN, Deisy Ragsdale, APRN-CNP    amiodarone (Nexterone) 360 mg in dextrose,iso-osm 200 mL (1.8 mg/mL) infusion (premix), 1 mg/min,  intravenous, Continuous, RAMONA Resendez, Last Rate: 33.3 mL/hr at 01/04/24 0943, 1 mg/min at 01/04/24 0943    amoxicillin-pot clavulanate (Augmentin) 875-125 mg per tablet 875 mg, 875 mg, oral, q12h SIENNA, Goran Torres, APRN-CNP, 875 mg at 01/04/24 1236    apixaban (Eliquis) tablet 5 mg, 5 mg, oral, q12h, RAMONA Resendez, 5 mg at 01/04/24 0808    atorvastatin (Lipitor) tablet 40 mg, 40 mg, oral, Daily, RAMONA Resendez, 40 mg at 01/04/24 0807    cyanocobalamin (Vitamin B-12) tablet 1,000 mcg, 1,000 mcg, oral, Daily, RAMONA Resendez, 1,000 mcg at 01/04/24 0806    dextrose 10 % in water (D10W) infusion, 0.3 g/kg/hr, intravenous, Once PRN, RAMONA Resendez    dextrose 50 % injection 25 g, 25 g, intravenous, q15 min PRN, RAMONA Resendez    folic acid (Folvite) tablet 1 mg, 1 mg, oral, Daily, RAMONA Resendez, 1 mg at 01/04/24 0806    gabapentin (Neurontin) capsule 100 mg, 100 mg, oral, Nightly, RAMONA Resendez, 100 mg at 01/03/24 2015    glucagon (Glucagen) injection 1 mg, 1 mg, intramuscular, q15 min PRN, RAMONA Resendez    [Held by provider] hydroCHLOROthiazide (HYDRODiuril) tablet 12.5 mg, 12.5 mg, oral, Daily, RAMONA Resendez    levothyroxine (Synthroid, Levoxyl) tablet 175 mcg, 175 mcg, oral, Daily before breakfast, RAMONA Resendez, 175 mcg at 01/04/24 0631    [Held by provider] losartan (Cozaar) tablet 25 mg, 25 mg, oral, Daily, RAMONA Resendez    [Held by provider] metoprolol tartrate (Lopressor) tablet 100 mg, 100 mg, oral, BID, RAMONA Resendez    metoprolol tartrate (Lopressor) tablet 100 mg, 100 mg, oral, BID, RAMONA Resendez    ondansetron (Zofran) injection 4 mg, 4 mg, intravenous, q6h PRN, RAMONA Resendez, 4 mg at 01/04/24 0815    oxygen (O2) therapy, , inhalation, Continuous PRN - O2/gases, RAMONA Resendez, Start at 01/04/24 0358    [START ON  1/5/2024] pantoprazole (ProtoNix) EC tablet 40 mg, 40 mg, oral, Daily before breakfast, RAMONA Fields    promethazine (Phenergan) 12.5 mg in sodium chloride 0.9% 50 mL IV, 12.5 mg, intravenous, q6h PRN, RAMONA Resendez    pyridoxine (Vitamin B-6) tablet 100 mg, 100 mg, oral, Nightly, RAMONA Resendez, 100 mg at 01/03/24 2016    sennosides (Senokot) tablet 17.2 mg, 2 tablet, oral, Nightly PRN, RAMONA Resendez    Review of Systems:  14 point review of systems was completed and negative except for those specially mention in my HPI    Physical Exam:    Heart Rate:  []   Temp:  [36.3 °C (97.3 °F)-37.8 °C (100 °F)]   Resp:  [16-44]   BP: ()/(51-77)   Weight:  [148 kg (326 lb 8 oz)]   SpO2:  [83 %-99 %]     Physical Exam  Vitals reviewed.   Constitutional:       General: She is not in acute distress.     Appearance: She is obese. She is not ill-appearing or toxic-appearing.   HENT:      Head: Normocephalic.      Mouth/Throat:      Mouth: Mucous membranes are dry.      Pharynx: Oropharynx is clear.   Eyes:      Extraocular Movements: Extraocular movements intact.      Conjunctiva/sclera: Conjunctivae normal.      Pupils: Pupils are equal, round, and reactive to light.   Cardiovascular:      Rate and Rhythm: Rhythm irregular.      Pulses: Normal pulses.      Heart sounds: Normal heart sounds.   Pulmonary:      Effort: Pulmonary effort is normal.      Breath sounds: Normal breath sounds.   Abdominal:      General: Bowel sounds are normal.      Palpations: Abdomen is soft.   Musculoskeletal:      Right lower leg: Edema present.      Left lower leg: Edema present.   Skin:     General: Skin is warm and dry.      Capillary Refill: Capillary refill takes 2 to 3 seconds.      Comments: LLE bright red area with small blisters around the lower calf region   Neurological:      General: No focal deficit present.      Mental Status: She is alert and oriented to person, place, and  time. Mental status is at baseline.   Psychiatric:         Mood and Affect: Mood normal.         Behavior: Behavior normal.         Thought Content: Thought content normal.         Objective:    I have reviewed all medications, laboratory results, and imaging pertinent for today's encounter.           Intake/Output Summary (Last 24 hours) at 1/4/2024 1446  Last data filed at 1/4/2024 1200  Gross per 24 hour   Intake 3315.15 ml   Output 800 ml   Net 2515.15 ml         Assessment/Plan:    I am currently managing this critically ill patient for the following problems:    #Afib RVR  #Hypotension  #Intractable N/V  #LLE Cellulitis      Neuro/Psych/Pain Ctrl/Sedation:  A & O x 4.   -CAM ICU and delirium precautions     Respiratory/ENT:  -keep SpO2 >92%, currently on RA  -pulm hygeine      Cardiovascular:  #AFIB RVR  #Hypotension  HR > 180's on PO and IV metoprolol BP 80's/50's at time of transfer to ICU, HR and BP improved with rate control  -Started on amiodarone bolus with Gtt  -Cards c/s, recs pending   -ECHO pending read  -Monitor on telemetry  -TSH slightly elevated at 6, ?sickeuthyroid  -Keep K>4, Mag >2    GI:  #Intractable nausea and vomiting suspected to be d/t ozempic, improved since admission  -CT shows possible duodenitis   -GI c/s, continue PPI, signed off today  -Advance diet as tolerated  -Antiemtics PRN   -Protonix daily      Renal/Volume Status (Intra & Extravascular):  #CKD stage III Cr at baseline ~1.4  -Cr today 1.64  -stopped IVF with good PO intake  -Daily BMP, mag, phos  -Avoid nephrotoxic agents    Endocrine  #Adrenal lesions on CT, L>R, slightly increased since 2020 CT  #Remote papillary thyroid cancer s/p thyroidectomy   #Hypothyroidism   -2.9 cm left and 1 cm right adrenal nodule/presumed adenomas noted on CT A/P. -Radiology advising follow-up multiphase CT or MRI w/ chemical shift recommended, can do this as out patient, rec following up with Minister[facility] [email or web address]     Infectious  Disease:  #Leukocytosis  #LLE cellulitis   #COVID  -BLE venous duplex neg for DVT  -WBC elevated with left shift and procalcitonin 4.37. Still unclear etiology but suspect possible LLE cellulitis, duodenitis.   -Switched Unasyn to Augmentin   -COVID dx early December, not steroid or remdesivir candidate      Heme/Onc:  -monitor for s/s anemia  -transfuse for hgb <7 or symptomatic  -daily CBC     OBGYN/MSK:  -PT/OT consult      Ethics/Code Status:  No needs      :  DVT Prophylaxis: On Eliquis   GI Prophylaxis: PPI  Bowel Regimen: None  Diet: Advance as tolerated to Cardiac  CVC: None  Gertrudis: None  Dominique: None  Restraints: None  Dispo: ICU    Critical Care Time:  40min  Discussed with Dr. Blanca Torres, APRN-CNP

## 2024-01-04 NOTE — CARE PLAN
The patient's goals for the shift include safety    The clinical goals for the shift include Pt will maintain HR less than 120 BPM during shift.      Problem: Pain - Adult  Goal: Verbalizes/displays adequate comfort level or baseline comfort level  Outcome: Progressing     Problem: Safety - Adult  Goal: Free from fall injury  Outcome: Progressing     Problem: Discharge Planning  Goal: Discharge to home or other facility with appropriate resources  Outcome: Progressing     Problem: Chronic Conditions and Co-morbidities  Goal: Patient's chronic conditions and co-morbidity symptoms are monitored and maintained or improved  Outcome: Progressing     Problem: Arrythmia/Dysrhythmia  Goal: Vital signs return to baseline  Outcome: Progressing

## 2024-01-04 NOTE — PROGRESS NOTES
"Mateo Enriquez is a 58 y.o. female on day 1 of admission presenting with Nausea and vomiting, unspecified vomiting type.    Subjective   Patient went into afib RVR overnight and was transferred to MICU for Amiodarone gtt. She is afebrile, HR irregular 90-100s. BP 86/58. Reports nausea, no vomiting. No overt GI bleeding. Hgb 15.    Objective   Constitutional:       General: She is not in acute distress.     Appearance: She is obese.   HENT:      Head: Normocephalic.      Nose: Nose normal.      Mouth/Throat:      Mouth: Mucous membranes are moist.      Pharynx: Oropharynx is clear.   Eyes:      Extraocular Movements: Extraocular movements intact.      Conjunctiva/sclera: Conjunctivae normal.      Pupils: Pupils are equal, round, and reactive to light.   Cardiovascular:      Rate and Rhythm: Normal rate and regular rhythm.      Pulses: Normal pulses.      Heart sounds: Normal heart sounds.   Pulmonary:      Effort: Pulmonary effort is normal.      Breath sounds: Normal breath sounds.   Abdominal:      General: Bowel sounds are normal.      Palpations: Abdomen is soft.      Tenderness: There is no abdominal tenderness. There is no guarding or rebound.   Musculoskeletal:         General: Normal range of motion.      Cervical back: Normal range of motion.   Skin:     General: Skin is warm and dry.   Neurological:      General: No focal deficit present.      Mental Status: She is alert and oriented to person, place, and time.   Psychiatric:         Mood and Affect: Mood normal.         Behavior: Behavior normal.          Last Recorded Vitals  Blood pressure 97/64, pulse 102, temperature 36.4 °C (97.5 °F), resp. rate 18, height 1.676 m (5' 6\"), weight 148 kg (326 lb 8 oz), SpO2 91 %.  Intake/Output last 3 Shifts:  I/O last 3 completed shifts:  In: 2788.9 (18.9 mL/kg) [P.O.:1650; I.V.:1138.9 (7.7 mL/kg)]  Out: - (0 mL/kg)   Weight: 147.7 kg     Relevant Results  CT A/P 1/2/2024:  IMPRESSION:  1. Mild stranding about the " descending duodenum. Clinical correlation  for signs of duodenitis recommended. Acute pancreatitis considered  less likely but not excluded.  2. Bilateral adrenal lesions, LEFT greater than RIGHT. These are  considered indeterminate. Follow-up multiphase CT or MRI with chemical  shift imaging recommended for further assessment.  3. Air within urinary bladder. Clinical correlation for recent  catheterization recommended.     ENDOSCOPIC REVIEW  No prior EGD     Colonoscopy with polypectomy July, 2021 indicated for positive Cologuard test.    A.  CECAL POLYP-   POLYPOID COLONIC MUCOSA WITH SMALL REACTIVE LYMPHOID AGGREGATES   NO EVIDENCE OF ADENOMATOUS EPITHELIUM.     B.  ASCENDING COLON POLYP-   TUBULAR ADENOMA.     C.  HEPATIC FLEXURE POLYP-   TUBULAR ADENOMA(S).     D.  TRANSVERSE COLON POLYP-   EARLY TUBULAR ADENOMA   FRAGMENT OF UNREMARKABLE COLONIC MUCOSA     E.  SIGMOID POLYP-   EARLY ADENOMATOUS POLYP   POLYPOID FRAGMENT OF UNREMARKABLE COLONIC MUCOSA    ALIFA/ALIFA       Assessment/Plan   The patient is a 58 y.o. female with signficant past medical history of hypothyroidism status post resection of papillary thyroid cancer, hypertension, A-fib, CKD III and class III obesity  who presents for nausea and vomiting.     Patient reports being started on Ozempic approximately 3 weeks ago for weight loss.  Patient reports nausea and intermittent upper abdominal pain since starting new medication.  However, she came to the ER after she started vomiting yesterday with worsening upper abdominal pain described as twisting and squeezing.  Patient reports 2 episodes of vomiting yesterday, none since.     Persistent nausea and intermittent upper abdominal pain with only 2 episodes of vomiting yesterday possibly 2/2 new medication Ozempic vs duodenitis on CT.  Tolerating clear liquids today with some nausea but no further vomiting and no abdominal pain today.  No overt GI bleeding.  No unintentional weight loss or dysphagia.  No mj.  Lipase WNL.  LFTs WNL except for TB 1.4.  Mild biliary dilation on CT which is often seen s/p cholecystectomy.  Possible duodenitis on CT could also be contributing to GI symptoms  History of A-fib-on Eliquis. Patient went into afib RVR last night with some hypotension. Transferred         MICU on Amiodarone gtt.   Positive for COVID early December, still testing positive     Plan  -No emergent/urgent indication for endoscopic evaluation at this time and patient prefers to hold off on EGD as well. Can likely discuss EGD outpatient if patient continues to improve  -Continue PPI 40 mg daily   -avoid NSAIDs  -advance diet as tolerated  -replete electrolytes as needed  -Continue supportive care per primary team   -Stool specimen for H. Pylori - needs collected  GI will sign off case. Feel free to call with any questions or concerns.   Patient should follow up with GI outpatient in 3-4 weeks once she recovers from COVID-19 infection and cardiac issues are resolved. Sooner if GI symptoms persist or if active GI bleeding is noted.      I spent 20 minutes in the professional and overall care of this patient.      Re Spears, APRN-CNP

## 2024-01-04 NOTE — NURSING NOTE
Patient appears to be in afib/rvr, message sent to Dr. Landers and Trini Greene NP, EKG in process, zofran given for nausea, patient is diaphoretic at this time.

## 2024-01-04 NOTE — NURSING NOTE
Rapid response called do to patient appearing to be in af with rvr. Dr. Landers, Deisy Ragsdale NP, Catherine Ruiz NP, Adele Ponce RN Nursing Supervisor, Fara Collazo RN at bedside, patient determined to require amiodarone drip, transferred to MICU 6 per order. Bedside report given to Maribell DEGROOT by this nurse.

## 2024-01-05 PROBLEM — I48.91 ATRIAL FIBRILLATION WITH RAPID VENTRICULAR RESPONSE (MULTI): Status: ACTIVE | Noted: 2024-01-05

## 2024-01-05 LAB
ANION GAP SERPL CALC-SCNC: 13 MMOL/L (ref 10–20)
ATRIAL RATE: 107 BPM
ATRIAL RATE: 178 BPM
BASOPHILS # BLD AUTO: 0.03 X10*3/UL (ref 0–0.1)
BASOPHILS NFR BLD AUTO: 0.4 %
BUN SERPL-MCNC: 21 MG/DL (ref 6–23)
CALCIUM SERPL-MCNC: 8.3 MG/DL (ref 8.6–10.3)
CHLORIDE SERPL-SCNC: 101 MMOL/L (ref 98–107)
CO2 SERPL-SCNC: 25 MMOL/L (ref 21–32)
CREAT SERPL-MCNC: 1.31 MG/DL (ref 0.5–1.05)
EOSINOPHIL # BLD AUTO: 0.1 X10*3/UL (ref 0–0.7)
EOSINOPHIL NFR BLD AUTO: 1.2 %
ERYTHROCYTE [DISTWIDTH] IN BLOOD BY AUTOMATED COUNT: 14.3 % (ref 11.5–14.5)
GFR SERPL CREATININE-BSD FRML MDRD: 48 ML/MIN/1.73M*2
GLUCOSE BLD MANUAL STRIP-MCNC: 106 MG/DL (ref 74–99)
GLUCOSE BLD MANUAL STRIP-MCNC: 87 MG/DL (ref 74–99)
GLUCOSE SERPL-MCNC: 90 MG/DL (ref 74–99)
HCT VFR BLD AUTO: 42 % (ref 36–46)
HGB BLD-MCNC: 13.8 G/DL (ref 12–16)
IMM GRANULOCYTES # BLD AUTO: 0.01 X10*3/UL (ref 0–0.7)
IMM GRANULOCYTES NFR BLD AUTO: 0.1 % (ref 0–0.9)
LYMPHOCYTES # BLD AUTO: 1.41 X10*3/UL (ref 1.2–4.8)
LYMPHOCYTES NFR BLD AUTO: 16.6 %
MAGNESIUM SERPL-MCNC: 2.34 MG/DL (ref 1.6–2.4)
MCH RBC QN AUTO: 32.2 PG (ref 26–34)
MCHC RBC AUTO-ENTMCNC: 32.9 G/DL (ref 32–36)
MCV RBC AUTO: 98 FL (ref 80–100)
MONOCYTES # BLD AUTO: 0.96 X10*3/UL (ref 0.1–1)
MONOCYTES NFR BLD AUTO: 11.3 %
NEUTROPHILS # BLD AUTO: 6 X10*3/UL (ref 1.2–7.7)
NEUTROPHILS NFR BLD AUTO: 70.4 %
NRBC BLD-RTO: 0 /100 WBCS (ref 0–0)
PLATELET # BLD AUTO: 147 X10*3/UL (ref 150–450)
POTASSIUM SERPL-SCNC: 3.5 MMOL/L (ref 3.5–5.3)
Q ONSET: 214 MS
Q ONSET: 232 MS
QRS COUNT: 18 BEATS
QRS COUNT: 27 BEATS
QRS DURATION: 80 MS
QRS DURATION: 84 MS
QT INTERVAL: 242 MS
QT INTERVAL: 330 MS
QTC CALCULATION(BAZETT): 402 MS
QTC CALCULATION(BAZETT): 448 MS
QTC FREDERICIA: 339 MS
QTC FREDERICIA: 405 MS
R AXIS: 34 DEGREES
R AXIS: 8 DEGREES
RBC # BLD AUTO: 4.28 X10*6/UL (ref 4–5.2)
SODIUM SERPL-SCNC: 135 MMOL/L (ref 136–145)
T AXIS: -27 DEGREES
T AXIS: -72 DEGREES
T OFFSET: 353 MS
T OFFSET: 379 MS
VENTRICULAR RATE: 111 BPM
VENTRICULAR RATE: 166 BPM
WBC # BLD AUTO: 8.5 X10*3/UL (ref 4.4–11.3)

## 2024-01-05 PROCEDURE — 82947 ASSAY GLUCOSE BLOOD QUANT: CPT

## 2024-01-05 PROCEDURE — 2500000002 HC RX 250 W HCPCS SELF ADMINISTERED DRUGS (ALT 637 FOR MEDICARE OP, ALT 636 FOR OP/ED): Performed by: INTERNAL MEDICINE

## 2024-01-05 PROCEDURE — 36415 COLL VENOUS BLD VENIPUNCTURE: CPT | Performed by: NURSE PRACTITIONER

## 2024-01-05 PROCEDURE — 2020000001 HC ICU ROOM DAILY

## 2024-01-05 PROCEDURE — 85025 COMPLETE CBC W/AUTO DIFF WBC: CPT | Performed by: NURSE PRACTITIONER

## 2024-01-05 PROCEDURE — 2500000004 HC RX 250 GENERAL PHARMACY W/ HCPCS (ALT 636 FOR OP/ED): Performed by: SURGERY

## 2024-01-05 PROCEDURE — 2500000001 HC RX 250 WO HCPCS SELF ADMINISTERED DRUGS (ALT 637 FOR MEDICARE OP): Performed by: NURSE PRACTITIONER

## 2024-01-05 PROCEDURE — 80048 BASIC METABOLIC PNL TOTAL CA: CPT | Performed by: NURSE PRACTITIONER

## 2024-01-05 PROCEDURE — 2500000004 HC RX 250 GENERAL PHARMACY W/ HCPCS (ALT 636 FOR OP/ED): Performed by: NURSE PRACTITIONER

## 2024-01-05 PROCEDURE — 2500000001 HC RX 250 WO HCPCS SELF ADMINISTERED DRUGS (ALT 637 FOR MEDICARE OP)

## 2024-01-05 PROCEDURE — 99291 CRITICAL CARE FIRST HOUR: CPT

## 2024-01-05 PROCEDURE — 2500000004 HC RX 250 GENERAL PHARMACY W/ HCPCS (ALT 636 FOR OP/ED)

## 2024-01-05 PROCEDURE — 83735 ASSAY OF MAGNESIUM: CPT | Performed by: NURSE PRACTITIONER

## 2024-01-05 RX ORDER — FUROSEMIDE 10 MG/ML
20 INJECTION INTRAMUSCULAR; INTRAVENOUS ONCE
Status: COMPLETED | OUTPATIENT
Start: 2024-01-05 | End: 2024-01-05

## 2024-01-05 RX ORDER — MAGNESIUM SULFATE HEPTAHYDRATE 40 MG/ML
2 INJECTION, SOLUTION INTRAVENOUS ONCE
Status: COMPLETED | OUTPATIENT
Start: 2024-01-05 | End: 2024-01-05

## 2024-01-05 RX ORDER — POTASSIUM CHLORIDE 20 MEQ/1
20 TABLET, EXTENDED RELEASE ORAL ONCE
Status: COMPLETED | OUTPATIENT
Start: 2024-01-05 | End: 2024-01-05

## 2024-01-05 RX ORDER — AMIODARONE HYDROCHLORIDE 200 MG/1
200 TABLET ORAL 2 TIMES DAILY
Status: DISCONTINUED | OUTPATIENT
Start: 2024-01-05 | End: 2024-01-08 | Stop reason: HOSPADM

## 2024-01-05 RX ADMIN — ATORVASTATIN CALCIUM 40 MG: 20 TABLET, FILM COATED ORAL at 08:41

## 2024-01-05 RX ADMIN — FUROSEMIDE 20 MG: 10 INJECTION, SOLUTION INTRAMUSCULAR; INTRAVENOUS at 13:10

## 2024-01-05 RX ADMIN — AMIODARONE HYDROCHLORIDE 200 MG: 200 TABLET ORAL at 20:33

## 2024-01-05 RX ADMIN — MAGNESIUM SULFATE HEPTAHYDRATE 2 G: 40 INJECTION, SOLUTION INTRAVENOUS at 06:13

## 2024-01-05 RX ADMIN — GABAPENTIN 100 MG: 100 CAPSULE ORAL at 20:33

## 2024-01-05 RX ADMIN — APIXABAN 5 MG: 5 TABLET, FILM COATED ORAL at 20:32

## 2024-01-05 RX ADMIN — AMOXICILLIN AND CLAVULANATE POTASSIUM 875 MG: 875; 125 TABLET, FILM COATED ORAL at 08:42

## 2024-01-05 RX ADMIN — PANTOPRAZOLE SODIUM 40 MG: 40 TABLET, DELAYED RELEASE ORAL at 06:14

## 2024-01-05 RX ADMIN — ACETAMINOPHEN 650 MG: 325 TABLET ORAL at 00:28

## 2024-01-05 RX ADMIN — LEVOTHYROXINE SODIUM 175 MCG: 125 TABLET ORAL at 06:14

## 2024-01-05 RX ADMIN — METOPROLOL TARTRATE 100 MG: 50 TABLET, FILM COATED ORAL at 20:33

## 2024-01-05 RX ADMIN — ACETAMINOPHEN 650 MG: 325 TABLET ORAL at 20:33

## 2024-01-05 RX ADMIN — CYANOCOBALAMIN TAB 500 MCG 1000 MCG: 500 TAB at 08:42

## 2024-01-05 RX ADMIN — AMOXICILLIN AND CLAVULANATE POTASSIUM 875 MG: 875; 125 TABLET, FILM COATED ORAL at 20:33

## 2024-01-05 RX ADMIN — ACETAMINOPHEN 650 MG: 325 TABLET ORAL at 15:17

## 2024-01-05 RX ADMIN — FOLIC ACID 1 MG: 1 TABLET ORAL at 08:42

## 2024-01-05 RX ADMIN — Medication 100 MG: at 20:34

## 2024-01-05 RX ADMIN — METOPROLOL TARTRATE 100 MG: 50 TABLET, FILM COATED ORAL at 08:42

## 2024-01-05 RX ADMIN — POTASSIUM CHLORIDE 20 MEQ: 1500 TABLET, EXTENDED RELEASE ORAL at 06:14

## 2024-01-05 RX ADMIN — APIXABAN 5 MG: 5 TABLET, FILM COATED ORAL at 06:14

## 2024-01-05 RX ADMIN — ACETAMINOPHEN 650 MG: 325 TABLET ORAL at 06:16

## 2024-01-05 RX ADMIN — AMIODARONE HYDROCHLORIDE 0.5 MG/MIN: 1.8 INJECTION, SOLUTION INTRAVENOUS at 00:29

## 2024-01-05 RX ADMIN — AMIODARONE HYDROCHLORIDE 0.5 MG/MIN: 1.8 INJECTION, SOLUTION INTRAVENOUS at 15:17

## 2024-01-05 ASSESSMENT — PAIN SCALES - GENERAL
PAINLEVEL_OUTOF10: 2
PAINLEVEL_OUTOF10: 1
PAINLEVEL_OUTOF10: 2

## 2024-01-05 ASSESSMENT — PAIN DESCRIPTION - DESCRIPTORS
DESCRIPTORS: ACHING
DESCRIPTORS: SORE

## 2024-01-05 ASSESSMENT — PAIN DESCRIPTION - LOCATION: LOCATION: LEG

## 2024-01-05 ASSESSMENT — PAIN - FUNCTIONAL ASSESSMENT
PAIN_FUNCTIONAL_ASSESSMENT: 0-10

## 2024-01-05 ASSESSMENT — PAIN DESCRIPTION - ORIENTATION: ORIENTATION: LEFT

## 2024-01-05 NOTE — NURSING NOTE
called back and was updated on patients condition and current status. New orders received.  gave orders to discontinue IV amio gtt and start PO. He stated that he will be here to evaluate the patient tomorrow evening, if the medical team feels it is appropriate to discharge the patient tomorrow, cardiology is ok with that, and he wants the patient to follow up with  who is here primary cardiologist in 2 weeks.

## 2024-01-05 NOTE — PROGRESS NOTES
[facility] Critical Care Medicine       Date:  1/5/2024  Patient:  Mateo Enriquez  YOB: 1965  MRN:  [id number]   Admit Date:  1/2/2024  ========================================================================================================    Chief Complaint   Patient presents with    Vomiting     Having fever, nausea, vomiting. Had COVID on thanksgiving, recently started ozempic.          History of Present Illness:  Mateo Enriquez is a 58 y.o. year old female patient with Past Medical History of   hypothyroidism status post resection of papillary thyroid cancer, hypertension, A-fib,  class III obesity, and COVID (dx 12/1/23) who presents to hospital due to concerns of intractable nausea and vomiting.  Patient was started on Ozempic approximately 3 weeks ago.  States she has been tolerating well and took her last dose of medication on 12/31.  Reports she began to experience episodes of emesis on 1/1.  Describes emesis as recent food intake as well as dinner from the night before.  Experienced continued bouts of vomiting which prompted her to present to the ED for evaluation.  Patient states she has been persistently sick since the beginning of December when she tested positive for COVID.       Interval ICU Events:  Patient in afib RVR with HR >180's with hypotension, diaphoresis, dizziness and palpitations. RR called by bedside RN. Patient bolused with IV amiodarone and started on drip. Transferred to ICU.      1/4: continue on amio gtt, awaiting cardiology recs.  HD stable now. LLE cellulitis starting to blister, DVT US negative.     1/5: Continued on amnio drip at 0.5.  Pending cardiology recommendations.  One-time 20 mg Lasix this morning.  Ordered home CPAP for at bedtime.      Medical History:  Past Medical History:   Diagnosis Date    A-fib (CMS/[facility])     Arthritis     Atrial fibrillation with rapid ventricular response (CMS/[facility]) 01/05/2024    Cancer (CMS/HCC)     Disease of thyroid gland      Hypertension      No past surgical history on file.  Medications Prior to Admission   Medication Sig Dispense Refill Last Dose    apixaban (Eliquis) 5 mg tablet Take 1 tablet (5 mg) by mouth 2 times a day.   1/2/2024    aspirin 81 mg EC tablet Take 1 tablet (81 mg) by mouth once daily.   1/2/2024    atorvastatin (Lipitor) 40 mg tablet Take 1 tablet (40 mg) by mouth once daily.   1/2/2024    cyanocobalamin (Vitamin B-12) 1,000 mcg tablet Take 1 tablet (1,000 mcg) by mouth once daily.   1/2/2024    folic acid (Folvite) 800 mcg tablet Take 1 tablet (0.8 mg) by mouth once daily. Wednesday's and Sundays takes 2 tablets   1/2/2024    gabapentin (Neurontin) 100 mg capsule Take 1 capsule (100 mg) by mouth once daily at bedtime.   1/2/2024    hydroCHLOROthiazide (HYDRODiuril) 12.5 mg tablet Take 1 tablet (12.5 mg) by mouth once daily.   1/2/2024    levothyroxine (Synthroid, Levoxyl) 175 mcg tablet Take 1 tablet (175 mcg) by mouth once daily in the morning. Take before meals.   1/2/2024    losartan (Cozaar) 25 mg tablet Take 1 tablet (25 mg) by mouth once daily.   1/2/2024    metoprolol tartrate (Lopressor) 100 mg tablet Take 1 tablet (100 mg) by mouth 2 times a day.   1/2/2024    pyridoxine (Vitamin B-6) 100 mg tablet Take 1 tablet (100 mg) by mouth once daily at bedtime.   1/2/2024    semaglutide (Ozempic) 0.25 mg or 0.5 mg (2 mg/3 mL) pen injector Inject 0.5 mg under the skin every 7 days. Patient took last dose on Sunday   Past Week     Lisinopril  Social History     Tobacco Use    Smoking status: Never     Passive exposure: Never    Smokeless tobacco: Never   Vaping Use    Vaping Use: Never used     No family history on file.    Hospital Medications:    amiodarone, 0.5 mg/min, Last Rate: 0.5 mg/min (01/05/24 0792)          Current Facility-Administered Medications:     acetaminophen (Tylenol) tablet 650 mg, 650 mg, oral, q4h PRN, 650 mg at 01/05/24 0616 **OR** acetaminophen (Tylenol) oral liquid 650 mg, 650 mg,  nasogastric tube, q4h PRN **OR** acetaminophen (Tylenol) suppository 650 mg, 650 mg, rectal, q4h PRN, RAMONA Resendez    amiodarone (Nexterone) 360 mg in dextrose,iso-osm 200 mL (1.8 mg/mL) infusion (premix), 0.5 mg/min, intravenous, Continuous, RAMONA Sawyer, Last Rate: 16.67 mL/hr at 01/05/24 0733, 0.5 mg/min at 01/05/24 0733    amoxicillin-pot clavulanate (Augmentin) 875-125 mg per tablet 875 mg, 875 mg, oral, q12h SIENNA, RAMONA Fields, 875 mg at 01/05/24 0842    apixaban (Eliquis) tablet 5 mg, 5 mg, oral, q12h, RAMONA Resendez, 5 mg at 01/05/24 0614    atorvastatin (Lipitor) tablet 40 mg, 40 mg, oral, Daily, RAMONA Resendez, 40 mg at 01/05/24 0841    cyanocobalamin (Vitamin B-12) tablet 1,000 mcg, 1,000 mcg, oral, Daily, RAMONA Resendez, 1,000 mcg at 01/05/24 0842    dextrose 10 % in water (D10W) infusion, 0.3 g/kg/hr, intravenous, Once PRN, RAMONA Resendez    dextrose 50 % injection 25 g, 25 g, intravenous, q15 min PRN, RAMONA Resendez    docusate sodium (Colace) capsule 100 mg, 100 mg, oral, BID, RAMONA Fields    folic acid (Folvite) tablet 1 mg, 1 mg, oral, Daily, RAMONA Resendez, 1 mg at 01/05/24 0842    gabapentin (Neurontin) capsule 100 mg, 100 mg, oral, Nightly, RAMONA Resendez, 100 mg at 01/04/24 2034    glucagon (Glucagen) injection 1 mg, 1 mg, intramuscular, q15 min PRN, RAMONA Resendez    [Held by provider] hydroCHLOROthiazide (HYDRODiuril) tablet 12.5 mg, 12.5 mg, oral, Daily, RAMONA Resendez    levothyroxine (Synthroid, Levoxyl) tablet 175 mcg, 175 mcg, oral, Daily before breakfast, RAMONA Resendez, 175 mcg at 01/05/24 0614    [Held by provider] losartan (Cozaar) tablet 25 mg, 25 mg, oral, Daily, RAMONA Resendez    metoprolol tartrate (Lopressor) tablet 100 mg, 100 mg, oral, BID, RAMONA Resendez, 100 mg at 01/05/24 0842     ondansetron (Zofran) injection 4 mg, 4 mg, intravenous, q6h PRN, RAMONA Resendez, 4 mg at 01/04/24 0815    oxygen (O2) therapy, , inhalation, Continuous PRN - O2/gases, RAMONA Resendez, Start at 01/04/24 0358    pantoprazole (ProtoNix) EC tablet 40 mg, 40 mg, oral, Daily before breakfast, RAMONA Fields, 40 mg at 01/05/24 0614    polyethylene glycol (Glycolax, Miralax) packet 17 g, 17 g, oral, Daily PRN, RAMONA Fields    promethazine (Phenergan) 12.5 mg in sodium chloride 0.9% 50 mL IV, 12.5 mg, intravenous, q6h PRN, RAMONA Resendez    pyridoxine (Vitamin B-6) tablet 100 mg, 100 mg, oral, Nightly, RAMONA Resendez, 100 mg at 01/04/24 2052    sennosides (Senokot) tablet 17.2 mg, 2 tablet, oral, Nightly PRN, RAMONA Resendez    Review of Systems:  14 point review of systems was completed and negative except for those specially mention in my HPI    Physical Exam:    Heart Rate:  []   Temp:  [36.2 °C (97.2 °F)-36.5 °C (97.7 °F)]   Resp:  [10-41]   BP: ()/(51-89)   SpO2:  [91 %-97 %]     Physical Exam  Vitals reviewed.   Constitutional:       General: She is not in acute distress.     Appearance: She is obese. She is not ill-appearing or toxic-appearing.   HENT:      Head: Normocephalic.      Mouth/Throat:      Mouth: Mucous membranes are dry.      Pharynx: Oropharynx is clear.   Eyes:      Extraocular Movements: Extraocular movements intact.      Conjunctiva/sclera: Conjunctivae normal.      Pupils: Pupils are equal, round, and reactive to light.   Cardiovascular:      Rate and Rhythm: Normal rate. Rhythm irregular.      Pulses: Normal pulses.      Heart sounds: Normal heart sounds.   Pulmonary:      Effort: Pulmonary effort is normal.      Breath sounds: Normal breath sounds.   Abdominal:      General: Bowel sounds are normal.      Palpations: Abdomen is soft.   Musculoskeletal:      Right lower leg: Edema present.      Left lower  leg: Edema present.   Skin:     General: Skin is warm and dry.      Capillary Refill: Capillary refill takes 2 to 3 seconds.      Comments: LLE bright red area with small blisters around the lower calf region   Neurological:      General: No focal deficit present.      Mental Status: She is alert and oriented to person, place, and time. Mental status is at baseline.   Psychiatric:         Mood and Affect: Mood normal.         Behavior: Behavior normal.         Thought Content: Thought content normal.         Objective:    I have reviewed all medications, laboratory results, and imaging pertinent for today's encounter.           Intake/Output Summary (Last 24 hours) at 1/5/2024 1353  Last data filed at 1/5/2024 1200  Gross per 24 hour   Intake 1009.81 ml   Output 1100 ml   Net -90.19 ml           Assessment/Plan:    I am currently managing this critically ill patient for the following problems:    #Afib RVR  #Hypotension  #Intractable N/V  #LLE Cellulitis      Neuro/Psych/Pain Ctrl/Sedation:  A & O x 4.   -CAM ICU and delirium precautions  -Pain: Tylenol PRN     Respiratory/ENT:  -keep SpO2 >92%, currently on RA  -pulm hygeine   -Home CPAP of 10, ordered for at bedtime     Cardiovascular:  #AFIB RVR  #Hypotension  HR > 180's on PO and IV metoprolol BP 80's/50's at time of transfer to ICU, HR and BP improved with rate control  -Continued on amnio drip now down to 0.5  -Cards c/s, recs pending   -ECHO: EF 55 to 60%, mild TR, IVC dilated  -One-time 20 mg Lasix given  -Monitor on telemetry  -TSH slightly elevated at 6, ?sickeuthyroid  -Keep K>4, Mag >2    GI:  #Intractable nausea and vomiting suspected to be d/t ozempic, improved since admission  -CT shows possible duodenitis   -GI c/s, continue PPI, signed off 1/4/24  -Advance diet as tolerated  -Antiemtics PRN   -Protonix daily      Renal/Volume Status (Intra & Extravascular):  #CKD stage III Cr at baseline ~1.4  -Cr down to 1.3  -Net positive 700ml/24h, 1x 20mg lasix  ordered  -Daily BMP, mag, phos  -Avoid nephrotoxic agents    Endocrine  #Adrenal lesions on CT, L>R, slightly increased since 2020 CT  #Remote papillary thyroid cancer s/p thyroidectomy   #Hypothyroidism   -2.9 cm left and 1 cm right adrenal nodule/presumed adenomas noted on CT A/P. -Radiology advising follow-up multiphase CT or MRI w/ chemical shift recommended, can do this as out patient, rec following up with Ministerio Bach [facility]  -Monitor for signs and symptoms of hypo and hyperglycemia     Infectious Disease:  #Leukocytosis, improving  #LLE cellulitis   #COVID, COVID dx early December, not steroid or remdesivir candidate   -BLE venous duplex neg for DVT, blood cultures negative to date  -WBC elevated with left shift and procalcitonin 4.37. Still unclear etiology but suspect possible LLE cellulitis, duodenitis.   -On Augmentin      Heme/Onc:  -monitor for s/s anemia  -transfuse for hgb <7 or symptomatic  -daily CBC     OBGYN/MSK:  -PT/OT consult      Ethics/Code Status:  No needs      :  DVT Prophylaxis: On Eliquis   GI Prophylaxis: PPI  Bowel Regimen: Colace  Diet: Cardiac  CVC: None  Gertrudis: None  Dominique: None  Restraints: None  Dispo: ICU    Critical Care Time:  40min  Discussed with Dr. Blanca Torres, APRN-CNP

## 2024-01-05 NOTE — CARE PLAN
"  Problem: Pain - Adult  Pt educated in pain scale as appropriate for understanding and condition. Baseline level of tolerable pain assessed and documented. Interventions reassessed at proper interval for effectiveness and documented per policy. Ongoing infusions monitored for excessive effect on arousal and other vital signs of BP and respiratory rate at regular interval every two hours and after changes in rate.     Goal: Verbalizes/displays adequate comfort level or baseline comfort level  Outcome: Progressing     Problem: Safety - Adult  Falls prevention measures have been explained. Pt warned of bed alarm use if activated, use of nurse call bell system detailed and modifications made to devices if activation is difficult.   Purposeful hourly rounding program explained for assurance of the \"five Ps (pain, position, potty, possessions, privacy). Monitoring devices, IV tubing and SCD connecting tubes shown to pt to educate on their contribution to falling hazard and personal injury potential.   The above points reviewed with pts lacking retention of new information until evidence of learning is shown.     Goal: Free from fall injury  Outcome: Progressing     Problem: Discharge Planning  Pt information obtained at admission about typical living arrangement and level of community support that pt has access to. Changes in level of function evaluated by multidisciplinary team and plan made for optimal level of pt independence and function once discharged from this hospital facility. Family or significant social support personnel sought for corroborating input if pt unable to speak for self or appears to be less than forthcoming with factual report of situation.      Goal: Discharge to home or other facility with appropriate resources  Outcome: Progressing     Problem: Arrythmia/Dysrhythmia  Pt will be monitored for harmful cardiac rhythms, fast or slow, with appropriate interventions and notification of providers in " timely manner for interventions or adjustment of medications. Medical or electrophysiological interventions explained and reinforced as needed to pt and family members.    Goal: Vital signs return to baseline  Outcome: Progressing     Problem: Daily Care  Pt oriented to ICU routine for vitals and assessments, location of bed controls and call bell system demonstrated, reinforced as neccessary,  oral and bodily hygiene assured at regular intervals and as needed. ROM of limbs demonstrating weakness performed. Diversionary activities provided, personal effects, phone and visual or hearing aides kept in reach on bedside table .    Goal: Daily care needs are met  Outcome: Progressing

## 2024-01-05 NOTE — TREATMENT PLAN
Spoke with patient at bedside and states she will have her family bring in her home cpap machine for tonight

## 2024-01-06 LAB
ANION GAP SERPL CALC-SCNC: 11 MMOL/L (ref 10–20)
BASOPHILS # BLD AUTO: 0.03 X10*3/UL (ref 0–0.1)
BASOPHILS NFR BLD AUTO: 0.5 %
BUN SERPL-MCNC: 22 MG/DL (ref 6–23)
CALCIUM SERPL-MCNC: 8.5 MG/DL (ref 8.6–10.3)
CHLORIDE SERPL-SCNC: 100 MMOL/L (ref 98–107)
CO2 SERPL-SCNC: 30 MMOL/L (ref 21–32)
CREAT SERPL-MCNC: 1.32 MG/DL (ref 0.5–1.05)
EOSINOPHIL # BLD AUTO: 0.23 X10*3/UL (ref 0–0.7)
EOSINOPHIL NFR BLD AUTO: 3.5 %
ERYTHROCYTE [DISTWIDTH] IN BLOOD BY AUTOMATED COUNT: 14 % (ref 11.5–14.5)
GFR SERPL CREATININE-BSD FRML MDRD: 47 ML/MIN/1.73M*2
GLUCOSE SERPL-MCNC: 87 MG/DL (ref 74–99)
HCT VFR BLD AUTO: 43.7 % (ref 36–46)
HGB BLD-MCNC: 14.3 G/DL (ref 12–16)
IMM GRANULOCYTES # BLD AUTO: 0.03 X10*3/UL (ref 0–0.7)
IMM GRANULOCYTES NFR BLD AUTO: 0.5 % (ref 0–0.9)
LYMPHOCYTES # BLD AUTO: 1.6 X10*3/UL (ref 1.2–4.8)
LYMPHOCYTES NFR BLD AUTO: 24.2 %
MAGNESIUM SERPL-MCNC: 2.38 MG/DL (ref 1.6–2.4)
MCH RBC QN AUTO: 32.3 PG (ref 26–34)
MCHC RBC AUTO-ENTMCNC: 32.7 G/DL (ref 32–36)
MCV RBC AUTO: 99 FL (ref 80–100)
MONOCYTES # BLD AUTO: 0.78 X10*3/UL (ref 0.1–1)
MONOCYTES NFR BLD AUTO: 11.8 %
NEUTROPHILS # BLD AUTO: 3.94 X10*3/UL (ref 1.2–7.7)
NEUTROPHILS NFR BLD AUTO: 59.5 %
NRBC BLD-RTO: 0 /100 WBCS (ref 0–0)
PLATELET # BLD AUTO: 194 X10*3/UL (ref 150–450)
POTASSIUM SERPL-SCNC: 3.5 MMOL/L (ref 3.5–5.3)
RBC # BLD AUTO: 4.43 X10*6/UL (ref 4–5.2)
SODIUM SERPL-SCNC: 137 MMOL/L (ref 136–145)
WBC # BLD AUTO: 6.6 X10*3/UL (ref 4.4–11.3)

## 2024-01-06 PROCEDURE — 2500000001 HC RX 250 WO HCPCS SELF ADMINISTERED DRUGS (ALT 637 FOR MEDICARE OP): Performed by: NURSE PRACTITIONER

## 2024-01-06 PROCEDURE — 85025 COMPLETE CBC W/AUTO DIFF WBC: CPT | Performed by: NURSE PRACTITIONER

## 2024-01-06 PROCEDURE — 2500000002 HC RX 250 W HCPCS SELF ADMINISTERED DRUGS (ALT 637 FOR MEDICARE OP, ALT 636 FOR OP/ED): Performed by: INTERNAL MEDICINE

## 2024-01-06 PROCEDURE — 2500000001 HC RX 250 WO HCPCS SELF ADMINISTERED DRUGS (ALT 637 FOR MEDICARE OP)

## 2024-01-06 PROCEDURE — 1210000001 HC SEMI-PRIVATE ROOM DAILY

## 2024-01-06 PROCEDURE — 2500000004 HC RX 250 GENERAL PHARMACY W/ HCPCS (ALT 636 FOR OP/ED)

## 2024-01-06 PROCEDURE — 83735 ASSAY OF MAGNESIUM: CPT | Performed by: NURSE PRACTITIONER

## 2024-01-06 PROCEDURE — 80048 BASIC METABOLIC PNL TOTAL CA: CPT | Performed by: NURSE PRACTITIONER

## 2024-01-06 PROCEDURE — 99291 CRITICAL CARE FIRST HOUR: CPT | Performed by: NURSE PRACTITIONER

## 2024-01-06 PROCEDURE — 36415 COLL VENOUS BLD VENIPUNCTURE: CPT | Performed by: NURSE PRACTITIONER

## 2024-01-06 PROCEDURE — 2500000002 HC RX 250 W HCPCS SELF ADMINISTERED DRUGS (ALT 637 FOR MEDICARE OP, ALT 636 FOR OP/ED): Performed by: NURSE PRACTITIONER

## 2024-01-06 PROCEDURE — 2500000004 HC RX 250 GENERAL PHARMACY W/ HCPCS (ALT 636 FOR OP/ED): Performed by: INTERNAL MEDICINE

## 2024-01-06 PROCEDURE — 2500000004 HC RX 250 GENERAL PHARMACY W/ HCPCS (ALT 636 FOR OP/ED): Performed by: NURSE PRACTITIONER

## 2024-01-06 RX ORDER — POTASSIUM CHLORIDE 20 MEQ/1
40 TABLET, EXTENDED RELEASE ORAL ONCE
Status: COMPLETED | OUTPATIENT
Start: 2024-01-06 | End: 2024-01-06

## 2024-01-06 RX ORDER — POTASSIUM CHLORIDE 1.5 G/1.58G
40 POWDER, FOR SOLUTION ORAL ONCE
Status: DISCONTINUED | OUTPATIENT
Start: 2024-01-06 | End: 2024-01-06

## 2024-01-06 RX ADMIN — GABAPENTIN 100 MG: 100 CAPSULE ORAL at 21:08

## 2024-01-06 RX ADMIN — POTASSIUM CHLORIDE 40 MEQ: 1500 TABLET, EXTENDED RELEASE ORAL at 09:00

## 2024-01-06 RX ADMIN — PANTOPRAZOLE SODIUM 40 MG: 40 TABLET, DELAYED RELEASE ORAL at 06:27

## 2024-01-06 RX ADMIN — AMOXICILLIN AND CLAVULANATE POTASSIUM 875 MG: 875; 125 TABLET, FILM COATED ORAL at 08:10

## 2024-01-06 RX ADMIN — ACETAMINOPHEN 650 MG: 325 TABLET ORAL at 06:32

## 2024-01-06 RX ADMIN — METOPROLOL TARTRATE 100 MG: 50 TABLET, FILM COATED ORAL at 08:10

## 2024-01-06 RX ADMIN — DEXTROSE MONOHYDRATE 2 G: 5 INJECTION INTRAVENOUS at 17:48

## 2024-01-06 RX ADMIN — APIXABAN 5 MG: 5 TABLET, FILM COATED ORAL at 08:10

## 2024-01-06 RX ADMIN — CYANOCOBALAMIN TAB 500 MCG 1000 MCG: 500 TAB at 09:00

## 2024-01-06 RX ADMIN — LEVOTHYROXINE SODIUM 175 MCG: 125 TABLET ORAL at 06:27

## 2024-01-06 RX ADMIN — AMIODARONE HYDROCHLORIDE 200 MG: 200 TABLET ORAL at 08:09

## 2024-01-06 RX ADMIN — Medication 100 MG: at 21:08

## 2024-01-06 RX ADMIN — METOPROLOL TARTRATE 100 MG: 50 TABLET, FILM COATED ORAL at 21:08

## 2024-01-06 RX ADMIN — AMIODARONE HYDROCHLORIDE 200 MG: 200 TABLET ORAL at 21:08

## 2024-01-06 RX ADMIN — ATORVASTATIN CALCIUM 40 MG: 20 TABLET, FILM COATED ORAL at 08:10

## 2024-01-06 RX ADMIN — APIXABAN 5 MG: 5 TABLET, FILM COATED ORAL at 21:08

## 2024-01-06 RX ADMIN — DOCUSATE SODIUM 100 MG: 100 CAPSULE, LIQUID FILLED ORAL at 21:08

## 2024-01-06 RX ADMIN — FOLIC ACID 1 MG: 1 TABLET ORAL at 08:10

## 2024-01-06 ASSESSMENT — COGNITIVE AND FUNCTIONAL STATUS - GENERAL
MOBILITY SCORE: 20
MOVING TO AND FROM BED TO CHAIR: A LITTLE
STANDING UP FROM CHAIR USING ARMS: A LITTLE
WALKING IN HOSPITAL ROOM: A LITTLE
DAILY ACTIVITIY SCORE: 24
CLIMB 3 TO 5 STEPS WITH RAILING: A LITTLE

## 2024-01-06 ASSESSMENT — PAIN SCALES - GENERAL: PAINLEVEL_OUTOF10: 0 - NO PAIN

## 2024-01-06 NOTE — CARE PLAN
The patient's goals for the shift include safety    The clinical goals for the shift include Patient will maintain vitals WNL    Over the shift, the patient did not make progress toward the following goals.  Recommendations to address these barriers include consulting ID. Reviewing medications

## 2024-01-06 NOTE — CONSULTS
Infectious Disease    Patient Name: Mateo Enriquez  Date: 1/6/2024  YOB: 1965  Medical Record Number: [id number]        Chief Complaint   Patient presents with    Vomiting     Having fever, nausea, vomiting. Had COVID on thanksgiving, recently started ozempic.          History of Present Illness:    Patient presents with n/v. Multiple times. Dehydrated. Labile blood pressures. Admitted next day LLL red, swollen. Denies hx of cellulitis. One cat at home, occasionally jumps out and hits legs. Doing better. Switched to augmentin had some nausea, but now better.     Review of Systems: All other ROS reviewed and are negative other than as stated in HPI            Social History     Tobacco Use    Smoking status: Never     Passive exposure: Never    Smokeless tobacco: Never   Vaping Use    Vaping Use: Never used         Past Medical History:   Diagnosis Date    A-fib (CMS/[facility])     Arthritis     Atrial fibrillation with rapid ventricular response (CMS/[facility]) 01/05/2024    Cancer (CMS/[facility])     Disease of thyroid gland     Hypertension            No past surgical history on file.        Current Facility-Administered Medications:     acetaminophen (Tylenol) tablet 650 mg, 650 mg, oral, q4h PRN, 650 mg at 01/06/24 0632 **OR** acetaminophen (Tylenol) oral liquid 650 mg, 650 mg, nasogastric tube, q4h PRN **OR** acetaminophen (Tylenol) suppository 650 mg, 650 mg, rectal, q4h PRN, RAMONA Kaufman    amiodarone (Pacerone) tablet 200 mg, 200 mg, oral, BID, RAMONA Kaufman, 200 mg at 01/06/24 0809    apixaban (Eliquis) tablet 5 mg, 5 mg, oral, q12h, RAMONA Kaufman, 5 mg at 01/06/24 0810    atorvastatin (Lipitor) tablet 40 mg, 40 mg, oral, Daily, RAMONA Kaufman, 40 mg at 01/06/24 0810    cefTRIAXone (Rocephin) in dextrose 5 % water (D5W) 50 mL IV 2 g, 2 g, intravenous, q24h, Yassine Pinto MD    cyanocobalamin (Vitamin B-12) tablet 1,000 mcg, 1,000 mcg, oral, Daily, Ministerio BUSTAMANTE  Salvador, APRN-CNP, 1,000 mcg at 01/06/24 0900    dextrose 10 % in water (D10W) infusion, 0.3 g/kg/hr, intravenous, Once PRN, RAMONA Kaufman    dextrose 50 % injection 25 g, 25 g, intravenous, q15 min PRN, RAMONA Kaufman    docusate sodium (Colace) capsule 100 mg, 100 mg, oral, BID, RAMONA Kaufman    folic acid (Folvite) tablet 1 mg, 1 mg, oral, Daily, RAMONA Kaufman, 1 mg at 01/06/24 0810    gabapentin (Neurontin) capsule 100 mg, 100 mg, oral, Nightly, RAMONA Kaufman, 100 mg at 01/05/24 2033    glucagon (Glucagen) injection 1 mg, 1 mg, intramuscular, q15 min PRN, RAMONA Kaufman    [Held by provider] hydroCHLOROthiazide (HYDRODiuril) tablet 12.5 mg, 12.5 mg, oral, Daily, RAMONA Kaufman    levothyroxine (Synthroid, Levoxyl) tablet 175 mcg, 175 mcg, oral, Daily before breakfast, RAMONA Kaufman, 175 mcg at 01/06/24 0627    [Held by provider] losartan (Cozaar) tablet 25 mg, 25 mg, oral, Daily, RAMONA Kaufman    metoprolol tartrate (Lopressor) tablet 100 mg, 100 mg, oral, BID, RAMONA Kaufman, 100 mg at 01/06/24 0810    ondansetron (Zofran) injection 4 mg, 4 mg, intravenous, q6h PRN, RAMONA Kaufman, 4 mg at 01/04/24 0815    oxygen (O2) therapy, , inhalation, Continuous PRN - O2/gases, RAMONA Kaufman, Start at 01/04/24 0358    pantoprazole (ProtoNix) EC tablet 40 mg, 40 mg, oral, Daily before breakfast, RAMONA Kaufman, 40 mg at 01/06/24 0627    polyethylene glycol (Glycolax, Miralax) packet 17 g, 17 g, oral, Daily PRN, RAMONA Kaufman    promethazine (Phenergan) 12.5 mg in sodium chloride 0.9% 50 mL IV, 12.5 mg, intravenous, q6h PRN, RAMONA Kaufman    pyridoxine (Vitamin B-6) tablet 100 mg, 100 mg, oral, Nightly, RAMONA Kaufman, 100 mg at 01/05/24 2034    sennosides (Senokot) tablet 17.2 mg, 2 tablet, oral, Nightly PRN, RAMONA Kaufman  "    Allergies   Allergen Reactions    Lisinopril Cough          No family history on file.      Physical Exam:    Blood pressure 113/73, pulse 87, temperature 35.9 °C (96.6 °F), resp. rate 26, height 1.676 m (5' 6\"), weight 148 kg (326 lb 4.5 oz), SpO2 94 %.  General: Patient appears ok at the present time. NAD  Skin: LLL erythema, tenderness, one blister lesion, serious  HEENT:  Neck is supple, No subconjunctival hemorrhages, no oral exudates  Heart: S1 S2  Lungs: clear bilaterally  Abdomen: soft, ND, NTTP,  Back :no CVA tenderness  Extrem: No edema, non tender  Neuro exam: CN II-XII intact  Psych: cooperative    Labs:  I have reviewed all lab results by electronic record, including most recent CBC, metabolic panel, and pertinent abnormalities were addressed from an infectious disease perspective.  Trends are being monitored over time.    Lab Results   Component Value Date    WBC 6.6 01/06/2024    HGB 14.3 01/06/2024    HCT 43.7 01/06/2024    MCV 99 01/06/2024     01/06/2024     Lab Results   Component Value Date    GLUCOSE 87 01/06/2024    CALCIUM 8.5 (L) 01/06/2024     01/06/2024    K 3.5 01/06/2024    CO2 30 01/06/2024     01/06/2024    BUN 22 01/06/2024    CREATININE 1.32 (H) 01/06/2024       Radiology:  I have reviewed imaging results per electronic record and most pertinent abnormalities are being addressed from an infectious disease standpoint.            ASSESSMENT:  Problem List Items Addressed This Visit          Gastrointestinal and Abdominal    * (Principal) Nausea and vomiting, unspecified vomiting type - Primary     Other Visit Diagnoses       Duodenitis        Abnormal head CT        Lesion of adrenal gland (CMS/HCC)        Atrial fibrillation with RVR (CMS/HCC)        Relevant Medications    metoprolol tartrate (Lopressor) injection 5 mg (Completed)    metoprolol tartrate (Lopressor) 100 mg tablet    metoprolol tartrate (Lopressor) tablet 100 mg    Other Relevant Orders    " Transthoracic Echo (TTE) Complete (Completed)    Edema of left lower extremity        Relevant Orders    Vascular US lower extremity venous duplex left (Completed)           Cellulitis  Sepsis- presumably      Strep vs pasteurella with this toxicity and hisotry  PLAN:   Keep on ceftriaxone as likely slower to respond with possible venus insuffiencey and until nausea resolved,    Plan oral therapy like augmentin for discharge

## 2024-01-06 NOTE — PROGRESS NOTES
[facility] Critical Care Medicine       Date:  1/6/2024  Patient:  Mateo Enriquez  YOB: 1965  MRN:  [id number]   Admit Date:  1/2/2024  ========================================================================================================    Chief Complaint   Patient presents with    Vomiting     Having fever, nausea, vomiting. Had COVID on thanksgiving, recently started ozempic.          History of Present Illness:  Mateo Enriquez is a 58 y.o. year old female patient with Past Medical History of   hypothyroidism status post resection of papillary thyroid cancer, hypertension, A-fib,  class III obesity, and COVID (dx 12/1/23) who presents to hospital due to concerns of intractable nausea and vomiting.  Patient was started on Ozempic approximately 3 weeks ago.  States she has been tolerating well and took her last dose of medication on 12/31.  Reports she began to experience episodes of emesis on 1/1.  Describes emesis as recent food intake as well as dinner from the night before.  Experienced continued bouts of vomiting which prompted her to present to the ED for evaluation.  Patient states she has been persistently sick since the beginning of December when she tested positive for COVID.       Interval ICU Events:  Patient in afib RVR with HR >180's with hypotension, diaphoresis, dizziness and palpitations. RR called by bedside RN. Patient bolused with IV amiodarone and started on drip. Transferred to ICU.      1/4: continue on amio gtt, awaiting cardiology recs.  HD stable now. LLE cellulitis starting to blister, DVT US negative.   1/6: Alert and oriented x 3.  Wants to go home.  Switch to amiodarone p.o.  Resuming home cardiac meds.  Left lower extremity with erythema.         Medical History:  Past Medical History:   Diagnosis Date    A-fib (CMS/Formerly [facility])     Arthritis     Atrial fibrillation with rapid ventricular response (CMS/Formerly [facility]) 01/05/2024    Cancer (CMS/HCC)     Disease of thyroid gland      Hypertension      No past surgical history on file.  Medications Prior to Admission   Medication Sig Dispense Refill Last Dose    apixaban (Eliquis) 5 mg tablet Take 1 tablet (5 mg) by mouth 2 times a day.   1/2/2024    aspirin 81 mg EC tablet Take 1 tablet (81 mg) by mouth once daily.   1/2/2024    atorvastatin (Lipitor) 40 mg tablet Take 1 tablet (40 mg) by mouth once daily.   1/2/2024    cyanocobalamin (Vitamin B-12) 1,000 mcg tablet Take 1 tablet (1,000 mcg) by mouth once daily.   1/2/2024    folic acid (Folvite) 800 mcg tablet Take 1 tablet (0.8 mg) by mouth once daily. Wednesday's and Sundays takes 2 tablets   1/2/2024    gabapentin (Neurontin) 100 mg capsule Take 1 capsule (100 mg) by mouth once daily at bedtime.   1/2/2024    hydroCHLOROthiazide (HYDRODiuril) 12.5 mg tablet Take 1 tablet (12.5 mg) by mouth once daily.   1/2/2024    levothyroxine (Synthroid, Levoxyl) 175 mcg tablet Take 1 tablet (175 mcg) by mouth once daily in the morning. Take before meals.   1/2/2024    losartan (Cozaar) 25 mg tablet Take 1 tablet (25 mg) by mouth once daily.   1/2/2024    metoprolol tartrate (Lopressor) 100 mg tablet Take 1 tablet (100 mg) by mouth 2 times a day.   1/2/2024    pyridoxine (Vitamin B-6) 100 mg tablet Take 1 tablet (100 mg) by mouth once daily at bedtime.   1/2/2024    semaglutide (Ozempic) 0.25 mg or 0.5 mg (2 mg/3 mL) pen injector Inject 0.5 mg under the skin every 7 days. Patient took last dose on Sunday   Past Week     Lisinopril  Social History     Tobacco Use    Smoking status: Never     Passive exposure: Never    Smokeless tobacco: Never   Vaping Use    Vaping Use: Never used     No family history on file.    Hospital Medications:             Current Facility-Administered Medications:     acetaminophen (Tylenol) tablet 650 mg, 650 mg, oral, q4h PRN, 650 mg at 01/06/24 0632 **OR** acetaminophen (Tylenol) oral liquid 650 mg, 650 mg, nasogastric tube, q4h PRN **OR** acetaminophen (Tylenol) suppository  650 mg, 650 mg, rectal, q4h PRN, RAMONA Resendez    amiodarone (Pacerone) tablet 200 mg, 200 mg, oral, BID, Kam Muniz MD, 200 mg at 01/06/24 0809    amoxicillin-pot clavulanate (Augmentin) 875-125 mg per tablet 875 mg, 875 mg, oral, q12h SIENNA, RAMONA Fields, 875 mg at 01/06/24 0810    apixaban (Eliquis) tablet 5 mg, 5 mg, oral, q12h, RAMONA Resendez, 5 mg at 01/06/24 0810    atorvastatin (Lipitor) tablet 40 mg, 40 mg, oral, Daily, RAMONA Resendez, 40 mg at 01/06/24 0810    cyanocobalamin (Vitamin B-12) tablet 1,000 mcg, 1,000 mcg, oral, Daily, RAMONA Resendez, 1,000 mcg at 01/06/24 0900    dextrose 10 % in water (D10W) infusion, 0.3 g/kg/hr, intravenous, Once PRN, RAMONA Resendez    dextrose 50 % injection 25 g, 25 g, intravenous, q15 min PRN, RAMONA Resendez    docusate sodium (Colace) capsule 100 mg, 100 mg, oral, BID, RAMONA Fields    folic acid (Folvite) tablet 1 mg, 1 mg, oral, Daily, RAMONA Resendez, 1 mg at 01/06/24 0810    gabapentin (Neurontin) capsule 100 mg, 100 mg, oral, Nightly, RAMONA Resendez, 100 mg at 01/05/24 2033    glucagon (Glucagen) injection 1 mg, 1 mg, intramuscular, q15 min PRN, RAMONA Resendez    hydroCHLOROthiazide (HYDRODiuril) tablet 12.5 mg, 12.5 mg, oral, Daily, RAMONA Resendez    levothyroxine (Synthroid, Levoxyl) tablet 175 mcg, 175 mcg, oral, Daily before breakfast, RAMONA Resendez, 175 mcg at 01/06/24 0627    [Held by provider] losartan (Cozaar) tablet 25 mg, 25 mg, oral, Daily, RAMONA Resendez    metoprolol tartrate (Lopressor) tablet 100 mg, 100 mg, oral, BID, RAMONA Resendez, 100 mg at 01/06/24 0810    ondansetron (Zofran) injection 4 mg, 4 mg, intravenous, q6h PRN, RAMONA Resendez, 4 mg at 01/04/24 0815    oxygen (O2) therapy, , inhalation, Continuous PRN - O2/gases, RAMONA Resendez, Start at  01/04/24 0358    pantoprazole (ProtoNix) EC tablet 40 mg, 40 mg, oral, Daily before breakfast, RAMONA Fields, 40 mg at 01/06/24 0627    polyethylene glycol (Glycolax, Miralax) packet 17 g, 17 g, oral, Daily PRN, RAMONA Fields    potassium chloride CR (Klor-Con M20) ER tablet 40 mEq, 40 mEq, oral, Once, RAMONA Kaufman    promethazine (Phenergan) 12.5 mg in sodium chloride 0.9% 50 mL IV, 12.5 mg, intravenous, q6h PRN, RAMONA Resendez    pyridoxine (Vitamin B-6) tablet 100 mg, 100 mg, oral, Nightly, RAMONA Resendez, 100 mg at 01/05/24 2034    sennosides (Senokot) tablet 17.2 mg, 2 tablet, oral, Nightly PRN, RAMONA Resendez    Review of Systems:  14 point review of systems was completed and negative except for those specially mention in my HPI    Physical Exam:    Heart Rate:  [66-90]   Temp:  [36 °C (96.8 °F)-36.4 °C (97.5 °F)]   Resp:  [6-45]   BP: ()/(56-89)   Weight:  [148 kg (326 lb 8 oz)]   SpO2:  [85 %-98 %]     Physical Exam  Vitals reviewed.   Constitutional:       General: She is not in acute distress.     Appearance: She is obese. She is not ill-appearing or toxic-appearing.   HENT:      Head: Normocephalic.      Mouth/Throat:      Mouth: Mucous membranes are dry.      Pharynx: Oropharynx is clear.   Eyes:      Extraocular Movements: Extraocular movements intact.      Conjunctiva/sclera: Conjunctivae normal.      Pupils: Pupils are equal, round, and reactive to light.   Cardiovascular:      Rate and Rhythm: Rhythm irregular.      Pulses: Normal pulses.      Heart sounds: Normal heart sounds.   Pulmonary:      Effort: Pulmonary effort is normal.      Breath sounds: Normal breath sounds.   Abdominal:      General: Bowel sounds are normal.      Palpations: Abdomen is soft.   Musculoskeletal:      Right lower leg: Edema present.      Left lower leg: Edema present.   Skin:     General: Skin is warm and dry.      Capillary Refill: Capillary  refill takes 2 to 3 seconds.      Comments: LLE bright red area with small blisters around the lower calf region   Neurological:      General: No focal deficit present.      Mental Status: She is alert and oriented to person, place, and time. Mental status is at baseline.   Psychiatric:         Mood and Affect: Mood normal.         Behavior: Behavior normal.         Thought Content: Thought content normal.         Objective:    I have reviewed all medications, laboratory results, and imaging pertinent for today's encounter.           Intake/Output Summary (Last 24 hours) at 1/6/2024 0836  Last data filed at 1/5/2024 2200  Gross per 24 hour   Intake 573.92 ml   Output 500 ml   Net 73.92 ml           Assessment/Plan:    I am currently managing this critically ill patient for the following problems:    #Afib RVR  #Hypotension  #Intractable N/V  #LLE Cellulitis      Neuro/Psych/Pain Ctrl/Sedation:  A & O x 4.   -CAM ICU and delirium precautions     Respiratory/ENT:  -keep SpO2 >92%, currently on RA  -pulm hygeine      Cardiovascular:  #AFIB RVR  #Hypotension  HR > 180's on PO and IV metoprolol BP 80's/50's at time of transfer to ICU, HR and BP improved with rate control  -Cards c/s, recs pending   -ECHO pending read  -Monitor on telemetry  -TSH slightly elevated at 6, ?sickeuthyroid  -Keep K>4, Mag >2  -Remains rate controlled.  Started back on p.o. amiodarone and p.o. metoprolol.  -Hypotension better,  Continue holding hydrocholoride and losartan for now.    GI:  #Intractable nausea and vomiting suspected to be d/t ozempic, improved since admission  -CT shows possible duodenitis   -GI c/s, continue PPI, signed off today  -Advance diet as tolerated  -Antiemtics PRN   -Protonix daily      Renal/Volume Status (Intra & Extravascular):  #CKD stage III Cr at baseline ~1.4  -Cr today 1.32  -stopped IVF with good PO intake  -Daily BMP, mag, phos  -Avoid nephrotoxic agents    Endocrine  #Adrenal lesions on CT, L>R, slightly  increased since 2020 CT  #Remote papillary thyroid cancer s/p thyroidectomy   #Hypothyroidism   -2.9 cm left and 1 cm right adrenal nodule/presumed adenomas noted on CT A/P. -Radiology advising follow-up multiphase CT or MRI w/ chemical shift recommended, can do this as out patient, [facility]     Infectious Disease:  #Leukocytosis  #LLE cellulitis   #COVID  -BLE venous duplex neg for DVT  -WBC elevated with left shift and procalcitonin 4.37. Still unclear etiology but suspect possible LLE cellulitis, duodenitis.   -Switched Unasyn to Augmentin   -COVID dx early December, not steroid or remdesivir candidate   -Consult infectious disease with left lower extremity cellulitis.     Heme/Onc:  -monitor for s/s anemia  -transfuse for hgb <7 or symptomatic  -daily CBC     OBGYN/MSK:  -PT/OT consult      Ethics/Code Status:  No needs      :  DVT Prophylaxis: On Eliquis   GI Prophylaxis: PPI  Bowel Regimen: None  Diet: Cardiac  CVC: None  Gertrudis: None  Dominique: None  Restraints: None  Dispo: Transfer to telemetry floor, can discharge in next 24 hours.  Will need follow-up with [facility] cardiology within 2 weeks    Total critical time 40 minutes  Ministerio Salvador [facility]  Adult Gerontology Acute Care Nurse Practitioner  [facility]  [phone]    Of note, this documentation is completed using the Dragon Dictation system (voice recognition software). There may be spelling and/or grammatical errors that were not corrected prior to final submission.

## 2024-01-06 NOTE — NURSING NOTE
"Attempted for the second time to call report to 10. No answer. Spoke with Ceci and she verbalized 'I will have her call you\".  "

## 2024-01-06 NOTE — NURSING NOTE
Called and spoke to patient about his previous labs - please refer to that note. He admits that he wasn't taking his Metformin daily as he should, which may be why his A1c is up. He was advised to continue to take his medications daily and will follow up w/ me in 6-8 weeks. +HepC Ab - referral to HEP made. Patient voices understanding and appreciates the call. No other questions at this time.       LM with ID answering service regarding consult

## 2024-01-07 LAB
ANION GAP SERPL CALC-SCNC: 10 MMOL/L (ref 10–20)
BACTERIA BLD CULT: NORMAL
BACTERIA BLD CULT: NORMAL
BASOPHILS # BLD AUTO: 0.06 X10*3/UL (ref 0–0.1)
BASOPHILS NFR BLD AUTO: 0.9 %
BUN SERPL-MCNC: 18 MG/DL (ref 6–23)
CALCIUM SERPL-MCNC: 8.8 MG/DL (ref 8.6–10.3)
CHLORIDE SERPL-SCNC: 101 MMOL/L (ref 98–107)
CO2 SERPL-SCNC: 30 MMOL/L (ref 21–32)
CREAT SERPL-MCNC: 1.19 MG/DL (ref 0.5–1.05)
EOSINOPHIL # BLD AUTO: 0.21 X10*3/UL (ref 0–0.7)
EOSINOPHIL NFR BLD AUTO: 3.3 %
ERYTHROCYTE [DISTWIDTH] IN BLOOD BY AUTOMATED COUNT: 14 % (ref 11.5–14.5)
GFR SERPL CREATININE-BSD FRML MDRD: 53 ML/MIN/1.73M*2
GLUCOSE SERPL-MCNC: 82 MG/DL (ref 74–99)
HCT VFR BLD AUTO: 42.9 % (ref 36–46)
HGB BLD-MCNC: 14.2 G/DL (ref 12–16)
IMM GRANULOCYTES # BLD AUTO: 0.03 X10*3/UL (ref 0–0.7)
IMM GRANULOCYTES NFR BLD AUTO: 0.5 % (ref 0–0.9)
LYMPHOCYTES # BLD AUTO: 1.88 X10*3/UL (ref 1.2–4.8)
LYMPHOCYTES NFR BLD AUTO: 29.5 %
MAGNESIUM SERPL-MCNC: 2.18 MG/DL (ref 1.6–2.4)
MCH RBC QN AUTO: 32.3 PG (ref 26–34)
MCHC RBC AUTO-ENTMCNC: 33.1 G/DL (ref 32–36)
MCV RBC AUTO: 98 FL (ref 80–100)
MONOCYTES # BLD AUTO: 0.69 X10*3/UL (ref 0.1–1)
MONOCYTES NFR BLD AUTO: 10.8 %
NEUTROPHILS # BLD AUTO: 3.51 X10*3/UL (ref 1.2–7.7)
NEUTROPHILS NFR BLD AUTO: 55 %
NRBC BLD-RTO: 0 /100 WBCS (ref 0–0)
PLATELET # BLD AUTO: 209 X10*3/UL (ref 150–450)
POTASSIUM SERPL-SCNC: 4.1 MMOL/L (ref 3.5–5.3)
RBC # BLD AUTO: 4.4 X10*6/UL (ref 4–5.2)
SODIUM SERPL-SCNC: 137 MMOL/L (ref 136–145)
WBC # BLD AUTO: 6.4 X10*3/UL (ref 4.4–11.3)

## 2024-01-07 PROCEDURE — 2500000002 HC RX 250 W HCPCS SELF ADMINISTERED DRUGS (ALT 637 FOR MEDICARE OP, ALT 636 FOR OP/ED): Performed by: NURSE PRACTITIONER

## 2024-01-07 PROCEDURE — 80048 BASIC METABOLIC PNL TOTAL CA: CPT | Performed by: NURSE PRACTITIONER

## 2024-01-07 PROCEDURE — 85025 COMPLETE CBC W/AUTO DIFF WBC: CPT | Performed by: NURSE PRACTITIONER

## 2024-01-07 PROCEDURE — 2500000004 HC RX 250 GENERAL PHARMACY W/ HCPCS (ALT 636 FOR OP/ED): Performed by: NURSE PRACTITIONER

## 2024-01-07 PROCEDURE — 2500000001 HC RX 250 WO HCPCS SELF ADMINISTERED DRUGS (ALT 637 FOR MEDICARE OP): Performed by: NURSE PRACTITIONER

## 2024-01-07 PROCEDURE — 36415 COLL VENOUS BLD VENIPUNCTURE: CPT | Performed by: NURSE PRACTITIONER

## 2024-01-07 PROCEDURE — 99232 SBSQ HOSP IP/OBS MODERATE 35: CPT | Performed by: STUDENT IN AN ORGANIZED HEALTH CARE EDUCATION/TRAINING PROGRAM

## 2024-01-07 PROCEDURE — 1210000001 HC SEMI-PRIVATE ROOM DAILY

## 2024-01-07 PROCEDURE — 2500000004 HC RX 250 GENERAL PHARMACY W/ HCPCS (ALT 636 FOR OP/ED): Performed by: INTERNAL MEDICINE

## 2024-01-07 PROCEDURE — 83735 ASSAY OF MAGNESIUM: CPT | Performed by: NURSE PRACTITIONER

## 2024-01-07 RX ADMIN — METOPROLOL TARTRATE 100 MG: 50 TABLET, FILM COATED ORAL at 22:05

## 2024-01-07 RX ADMIN — DEXTROSE MONOHYDRATE 2 G: 5 INJECTION INTRAVENOUS at 17:06

## 2024-01-07 RX ADMIN — CYANOCOBALAMIN TAB 500 MCG 1000 MCG: 500 TAB at 09:19

## 2024-01-07 RX ADMIN — FOLIC ACID 1 MG: 1 TABLET ORAL at 09:18

## 2024-01-07 RX ADMIN — AMIODARONE HYDROCHLORIDE 200 MG: 200 TABLET ORAL at 09:18

## 2024-01-07 RX ADMIN — METOPROLOL TARTRATE 100 MG: 50 TABLET, FILM COATED ORAL at 09:19

## 2024-01-07 RX ADMIN — GABAPENTIN 100 MG: 100 CAPSULE ORAL at 22:05

## 2024-01-07 RX ADMIN — APIXABAN 5 MG: 5 TABLET, FILM COATED ORAL at 09:19

## 2024-01-07 RX ADMIN — LEVOTHYROXINE SODIUM 175 MCG: 125 TABLET ORAL at 06:26

## 2024-01-07 RX ADMIN — AMIODARONE HYDROCHLORIDE 200 MG: 200 TABLET ORAL at 22:05

## 2024-01-07 RX ADMIN — APIXABAN 5 MG: 5 TABLET, FILM COATED ORAL at 22:05

## 2024-01-07 RX ADMIN — PANTOPRAZOLE SODIUM 40 MG: 40 TABLET, DELAYED RELEASE ORAL at 07:00

## 2024-01-07 RX ADMIN — DOCUSATE SODIUM 100 MG: 100 CAPSULE, LIQUID FILLED ORAL at 09:19

## 2024-01-07 RX ADMIN — ATORVASTATIN CALCIUM 40 MG: 20 TABLET, FILM COATED ORAL at 09:19

## 2024-01-07 RX ADMIN — Medication 100 MG: at 22:04

## 2024-01-07 NOTE — CONSULTS
Consults  History Of Present Illness:    Mateo Enriquez is a 58 y.o. female presenting with nausea vomiting.  Cardiology consulted for management of atrial fibrillation    58-year-old female with history of atrial fibrillation, hypertension, hyperlipidemia, obesity, recent COVID infection, hypothyroidism, who was admitted to the hospital post nausea vomiting    Patient was found in A-fib with RVR    Initially patient was managed in the ICU  Managed with amiodarone drip  Currently on MedSurg telemetry  Son at bedside  TTE 1/4/2024 EF 55 to 60%     Last Recorded Vitals:  Vitals:    01/07/24 0059 01/07/24 0806 01/07/24 0918 01/07/24 1405   BP: 112/58 116/60  98/60   BP Location: Right arm      Patient Position: Sitting      Pulse: 84 84 101 83   Resp: 18      Temp: 36.8 °C (98.2 °F) 35.4 °C (95.7 °F)  35 °C (95 °F)   TempSrc: Temporal      SpO2: 95% 94%  94%   Weight:       Height:           Last Labs:  CBC - 1/7/2024:  6:50 AM  6.4 14.2 209    42.9      CMP - 1/7/2024:  6:50 AM  8.8 7.6 29 --- 1.4   _ 3.8 28 95      PTT - No results in last year.  1.6   18.3 _     Troponin I, High Sensitivity   Date/Time Value Ref Range Status   01/02/2024 08:05 PM 9 0 - 13 ng/L Final   01/02/2024 07:20 PM 9 0 - 13 ng/L Final     BNP   Date/Time Value Ref Range Status   01/02/2024 07:20  (H) 0 - 99 pg/mL Final   11/13/2020 07:50  (H) 0 - 99 pg/mL Final     Comment:     .  <100 pg/mL - Heart failure unlikely  100-299 pg/mL - Intermediate probability of acute heart  .               failure exacerbation. Correlate with clinical  .               context and patient history.    >=300 pg/mL - Heart Failure likely. Correlate with clinical  .               context and patient history.  BNP testing is performed using different testing   methodology at [facility] than at other   [facility] hospitals. Direct result comparisons should   only be made within the same method.       Hemoglobin A1C   Date/Time Value Ref Range  Status   12/12/2023 12:45 PM 6.4 (H) 4.8 - 5.9 % Final   06/29/2021 12:30 PM 6.2 (H) 4.8 - 5.9 % Final     VLDL   Date/Time Value Ref Range Status   11/14/2020 04:52 AM 15 0 - 40 mg/dL Final      Last I/O:  I/O last 3 completed shifts:  In: 700 (4.7 mL/kg) [P.O.:700]  Out: 450 (3 mL/kg) [Urine:450 (0.1 mL/kg/hr)]  Weight: 148 kg     Past Cardiology Tests (Last 3 Years):  EKG:  ECG 12 Lead 01/04/2024      ECG 12 Lead 01/04/2024      ECG 12 lead 01/02/2024      ECG 12 lead 01/02/2024    Echo:  Transthoracic Echo (TTE) Complete 01/04/2024    Ejection Fractions:  EF   Date/Time Value Ref Range Status   01/04/2024 07:56 AM 58       Cath:  No results found for this or any previous visit from the past 1095 days.    Stress Test:  No results found for this or any previous visit from the past 1095 days.    Cardiac Imaging:  No results found for this or any previous visit from the past 1095 days.      Past Medical History:  She has a past medical history of A-fib (CMS/HCC), Arthritis, Atrial fibrillation with rapid ventricular response (CMS/HCC) (01/05/2024), Cancer (CMS/HCC), Disease of thyroid gland, and Hypertension.    Past Surgical History:  She has no past surgical history on file.      Social History:  She reports that she has never smoked. She has never been exposed to tobacco smoke. She has never used smokeless tobacco. No history on file for alcohol use and drug use.    Family History:  No family history on file.     Allergies:  Lisinopril    Inpatient Medications:  Scheduled medications   Medication Dose Route Frequency    amiodarone  200 mg oral BID    apixaban  5 mg oral q12h    atorvastatin  40 mg oral Daily    cefTRIAXone  2 g intravenous q24h    cyanocobalamin  1,000 mcg oral Daily    docusate sodium  100 mg oral BID    folic acid  1 mg oral Daily    gabapentin  100 mg oral Nightly    [Held by provider] hydroCHLOROthiazide  12.5 mg oral Daily    levothyroxine  175 mcg oral Daily before breakfast    [Held by  provider] losartan  25 mg oral Daily    metoprolol tartrate  100 mg oral BID    pantoprazole  40 mg oral Daily before breakfast    pyridoxine  100 mg oral Nightly     PRN medications   Medication    acetaminophen    Or    acetaminophen    Or    acetaminophen    dextrose 10 % in water (D10W)    dextrose    glucagon    ondansetron    oxygen    polyethylene glycol    promethazine    sennosides     Continuous Medications   Medication Dose Last Rate     Outpatient Medications:  Current Outpatient Medications   Medication Instructions    apixaban (ELIQUIS) 5 mg, oral, 2 times daily    aspirin 81 mg, oral, Daily    atorvastatin (LIPITOR) 40 mg, oral, Daily    cyanocobalamin (VITAMIN B-12) 1,000 mcg, oral, Daily    folic acid (FOLVITE) 0.8 mg, oral, Daily, Wednesday's and Sundays takes 2 tablets    gabapentin (NEURONTIN) 100 mg, oral, Nightly    hydroCHLOROthiazide (HYDRODIURIL) 12.5 mg, oral, Daily    levothyroxine (SYNTHROID, LEVOXYL) 175 mcg, oral, Daily before breakfast    losartan (COZAAR) 25 mg, oral, Daily    metoprolol tartrate (LOPRESSOR) 100 mg, oral, 2 times daily    Ozempic 0.5 mg, subcutaneous, Every 7 days, Patient took last dose on Sunday    pyridoxine (VITAMIN B-6) 100 mg, oral, Nightly       Physical Exam:  General: Alert oriented x 4 no acute distress  Lungs: Clear To station bilaterally  CV: Irregularly irregular rhythm  Extremities: 2+ distal pulses throughout       Assessment/Plan   A-fib with RVR.  Currently rate controlled  Moderate CAD by cath 2021 IFR negative  Hypertension  History of PE saddle embolism   Chronic DVT  CKD  TTE during this admission EF 55 to 55-60%  Cellulitis    Plan:  Continue telemetry  Continue amiodarone 200 mg p.o. twice daily  Continue full dose anticoagulation with Eliquis 5 mg p.o. twice daily  Continue metoprolol 100 mg p.o. twice daily  Continue aspirin and atorvastatin  Please keep potassium between 4 and 5 magnesium above 2  Please keep hemoglobin above 8 and platelets  above 50  Further ischemic evaluation as an outpatient  From cardiology standpoint patient can be discharged home  Patient should follow-up in clinic with Dr. Hooper this coming week    Peripheral IV 01/04/24 22 G Left;Dorsal Hand (Active)   Site Assessment Clean;Dry;Intact 01/07/24 1600   Dressing Status Clean;Dry 01/07/24 1600   Number of days: 3       Code Status:  Full Code    I spent 50 minutes in the professional and overall care of this patient.        Kam Muniz MD

## 2024-01-07 NOTE — PROGRESS NOTES
Spoke with patient who states she is from home, lives with her . Patient admitted septic from cellulitis of left lower extremity. Patient currently on Rocephin 2 G IV daily, will continue for now. Will follow with ID for plan for discharge antibiotics.

## 2024-01-07 NOTE — PROGRESS NOTES
"Daily progress note    Mateo Enriquez is 58 y.o. female Past Medical History of   hypothyroidism status post resection of papillary thyroid cancer, hypertension, A-fib,  class III obesity, and COVID (dx 12/1/23) who presents to hospital due to concerns of intractable nausea and vomiting.  Patient was started on Ozempic approximately 3 weeks ago. Patient was in ICU with Afib with RVR, with HR > 180 with hypotension, patient was on amiodarone drip in ICU and was switched to PO amiodarone and transferred to the floor.     Subjective   Patient was seen and examined at bedside  Patient reports some nausea but has improved  No vomiting  No fever or leukocytosis  Still has red left lower extremity on IV antibiotics  Was switched from IV amiodarone to p.o. amiodarone but still pending cardiology eval        Vital signs in last 24 hours:  Temp:  [35.4 °C (95.7 °F)-37.1 °C (98.8 °F)] 35.4 °C (95.7 °F)  Heart Rate:  [] 101  Resp:  [13-26] 18  BP: ()/(58-81) 116/60  /60   Pulse 101   Temp 35.4 °C (95.7 °F)   Resp 18   Ht 1.676 m (5' 6\")   Wt 148 kg (326 lb 4.5 oz)   SpO2 94%   BMI 52.66 kg/m²    Intake/Output last 3 shifts:  I/O last 3 completed shifts:  In: 700 (4.7 mL/kg) [P.O.:700]  Out: 450 (3 mL/kg) [Urine:450 (0.1 mL/kg/hr)]  Weight: 148 kg   Intake/Output this shift:  No intake/output data recorded.    Physical Exam  Constitutional:       General: She is not in acute distress.     Appearance: Normal appearance. She is not ill-appearing, toxic-appearing or diaphoretic.   HENT:      Head: Normocephalic and atraumatic.      Mouth/Throat:      Mouth: Mucous membranes are moist.      Pharynx: No oropharyngeal exudate.   Eyes:      Extraocular Movements: Extraocular movements intact.      Pupils: Pupils are equal, round, and reactive to light.   Cardiovascular:      Rate and Rhythm: Normal rate. Rhythm irregular.      Heart sounds: No murmur heard.  Pulmonary:      Effort: Pulmonary effort is normal. " No respiratory distress.      Breath sounds: Normal breath sounds. No wheezing.   Abdominal:      General: Abdomen is flat. Bowel sounds are normal. There is no distension.      Tenderness: There is no abdominal tenderness. There is no guarding or rebound.   Musculoskeletal:         General: No swelling.      Right lower leg: No edema.      Left lower leg: Edema present.      Comments: Erythema and edema over the left lower extremity, blister over the left lateral diffusely erythematous skin over the left shin with edema   Skin:     Findings: No lesion or rash.   Neurological:      General: No focal deficit present.      Mental Status: She is alert and oriented to person, place, and time.      Cranial Nerves: No cranial nerve deficit.      Motor: No weakness.   Psychiatric:         Mood and Affect: Mood normal.         Behavior: Behavior normal.         Current medication   Current Facility-Administered Medications   Medication Dose Route Frequency Provider Last Rate Last Admin    acetaminophen (Tylenol) tablet 650 mg  650 mg oral q4h PRN RAMONA Kaufman   650 mg at 01/06/24 0632    Or    acetaminophen (Tylenol) oral liquid 650 mg  650 mg nasogastric tube q4h PRN RAMONA Kaufman        Or    acetaminophen (Tylenol) suppository 650 mg  650 mg rectal q4h PRN RAMONA Kaufman        amiodarone (Pacerone) tablet 200 mg  200 mg oral BID RAMONA Kaufman   200 mg at 01/07/24 0918    apixaban (Eliquis) tablet 5 mg  5 mg oral q12h RAMONA Kaufman   5 mg at 01/07/24 0919    atorvastatin (Lipitor) tablet 40 mg  40 mg oral Daily RAMONA Kaufman   40 mg at 01/07/24 0919    cefTRIAXone (Rocephin) in dextrose 5 % water (D5W) 50 mL IV 2 g  2 g intravenous q24h Yassine Pinto MD   Stopped at 01/06/24 1818    cyanocobalamin (Vitamin B-12) tablet 1,000 mcg  1,000 mcg oral Daily RAMONA Kaufman   1,000 mcg at 01/07/24 0919    dextrose 10 % in water (D10W) infusion   0.3 g/kg/hr intravenous Once PRN RAMONA Kaufman        dextrose 50 % injection 25 g  25 g intravenous q15 min PRN RAMONA Kaufman        docusate sodium (Colace) capsule 100 mg  100 mg oral BID RAMONA Kaufman   100 mg at 01/07/24 0919    folic acid (Folvite) tablet 1 mg  1 mg oral Daily RAMONA Kaufman   1 mg at 01/07/24 0918    gabapentin (Neurontin) capsule 100 mg  100 mg oral Nightly RAMONA Kaufman   100 mg at 01/06/24 2108    glucagon (Glucagen) injection 1 mg  1 mg intramuscular q15 min PRN RAMONA Kaufman        [Held by provider] hydroCHLOROthiazide (HYDRODiuril) tablet 12.5 mg  12.5 mg oral Daily RAMONA Kaufman        levothyroxine (Synthroid, Levoxyl) tablet 175 mcg  175 mcg oral Daily before breakfast RAMONA Kaufman   175 mcg at 01/07/24 0626    [Held by provider] losartan (Cozaar) tablet 25 mg  25 mg oral Daily RAMONA Kaufman        metoprolol tartrate (Lopressor) tablet 100 mg  100 mg oral BID RAMONA Kaufman   100 mg at 01/07/24 0919    ondansetron (Zofran) injection 4 mg  4 mg intravenous q6h PRN RAMONA Kaufman   4 mg at 01/04/24 0815    oxygen (O2) therapy   inhalation Continuous PRN - O2/gases RAMONA Kaufman   Start at 01/04/24 0358    pantoprazole (ProtoNix) EC tablet 40 mg  40 mg oral Daily before breakfast RAMONA Kaufman   40 mg at 01/07/24 0700    polyethylene glycol (Glycolax, Miralax) packet 17 g  17 g oral Daily PRN RAMONA Kaufman        promethazine (Phenergan) 12.5 mg in sodium chloride 0.9% 50 mL IV  12.5 mg intravenous q6h PRN RAMONA Kaufman        pyridoxine (Vitamin B-6) tablet 100 mg  100 mg oral Nightly HUEY KaufmanCNP   100 mg at 01/06/24 2108    sennosides (Senokot) tablet 17.2 mg  2 tablet oral Nightly PRN RAMONA Kaufman            Labs  Lab Results   Component Value Date    WBC 6.4 01/07/2024    HGB  14.2 01/07/2024    HCT 42.9 01/07/2024    MCV 98 01/07/2024     01/07/2024     Lab Results   Component Value Date    HGBA1C 6.4 (H) 12/12/2023     Lab Results   Component Value Date    GLUCOSE 82 01/07/2024    CALCIUM 8.8 01/07/2024     01/07/2024    K 4.1 01/07/2024    CO2 30 01/07/2024     01/07/2024    BUN 18 01/07/2024    CREATININE 1.19 (H) 01/07/2024           Principal Problem:    Nausea and vomiting, unspecified vomiting type  Active Problems:    Atrial fibrillation with rapid ventricular response (CMS/HCC)      Assessment and Plan   #AFIB RVR  #Hypotension    Patient was transferred to ICU for A-fib with RVR with low blood pressure  Patient was started on amiodarone drip and heart rate controlled now  Cardiology consulted  Amiodarone switched to p.o.  Continue amiodarone p.o. for now  Final recommendation as per cardiology  Hypotension has resolved but blood pressure remains borderline low and hydrochlorothiazide and losartan has been on hold  Continue metoprolol       #Nausea and vomiting improved  #Duodenitis on CT  Continue PPI  GI signed off  Continue current diet  As needed Zofran for nausea and vomiting    #Left lower extremity cellulitis  #Leukocytosis resolved  #Chronic venous stasis  Bilateral lower extremity duplex ultrasound was negative for DVT  She had elevated procalcitonin with elevated WBC count  Improved  Currently on Rocephin  Plan is to switch to p.o. Augmentin on discharge  Local wound care for left lower extremity  Had blister on the lateral side with erythema  ID is on board    #CKD stage III Cr at baseline ~1.4    Continue monitoring BMP  -Avoid nephrotoxic agents      #Adrenal lesions on CT, L>R, slightly increased since 2020 CT  #Remote papillary thyroid cancer s/p thyroidectomy   #Hypothyroidism   -2.9 cm left and 1 cm right adrenal nodule/presumed adenomas noted on CT A/P. -  Outpatient CT scan/MRI for further evaluation     DVT Prophylaxis: On Eliquis    Disposition plan is to discharge home once cleared by cardiology  Will discuss further with ID regarding antibiotics.   LOS: 4 days

## 2024-01-08 VITALS
DIASTOLIC BLOOD PRESSURE: 73 MMHG | HEART RATE: 72 BPM | HEIGHT: 66 IN | WEIGHT: 293 LBS | TEMPERATURE: 97.3 F | SYSTOLIC BLOOD PRESSURE: 118 MMHG | RESPIRATION RATE: 12 BRPM | BODY MASS INDEX: 47.09 KG/M2 | OXYGEN SATURATION: 94 %

## 2024-01-08 LAB
ANION GAP SERPL CALC-SCNC: 10 MMOL/L (ref 10–20)
BASOPHILS # BLD AUTO: 0.06 X10*3/UL (ref 0–0.1)
BASOPHILS NFR BLD AUTO: 0.7 %
BUN SERPL-MCNC: 16 MG/DL (ref 6–23)
CALCIUM SERPL-MCNC: 8.8 MG/DL (ref 8.6–10.3)
CHLORIDE SERPL-SCNC: 102 MMOL/L (ref 98–107)
CO2 SERPL-SCNC: 29 MMOL/L (ref 21–32)
CREAT SERPL-MCNC: 1.12 MG/DL (ref 0.5–1.05)
EOSINOPHIL # BLD AUTO: 0.2 X10*3/UL (ref 0–0.7)
EOSINOPHIL NFR BLD AUTO: 2.5 %
ERYTHROCYTE [DISTWIDTH] IN BLOOD BY AUTOMATED COUNT: 13.8 % (ref 11.5–14.5)
GFR SERPL CREATININE-BSD FRML MDRD: 57 ML/MIN/1.73M*2
GLUCOSE BLD MANUAL STRIP-MCNC: 111 MG/DL (ref 74–99)
GLUCOSE SERPL-MCNC: 98 MG/DL (ref 74–99)
HCT VFR BLD AUTO: 44.9 % (ref 36–46)
HGB BLD-MCNC: 14.7 G/DL (ref 12–16)
HOLD SPECIMEN: NORMAL
IMM GRANULOCYTES # BLD AUTO: 0.02 X10*3/UL (ref 0–0.7)
IMM GRANULOCYTES NFR BLD AUTO: 0.2 % (ref 0–0.9)
LYMPHOCYTES # BLD AUTO: 2.55 X10*3/UL (ref 1.2–4.8)
LYMPHOCYTES NFR BLD AUTO: 31.6 %
MAGNESIUM SERPL-MCNC: 2.05 MG/DL (ref 1.6–2.4)
MCH RBC QN AUTO: 32.2 PG (ref 26–34)
MCHC RBC AUTO-ENTMCNC: 32.7 G/DL (ref 32–36)
MCV RBC AUTO: 99 FL (ref 80–100)
MONOCYTES # BLD AUTO: 0.92 X10*3/UL (ref 0.1–1)
MONOCYTES NFR BLD AUTO: 11.4 %
NEUTROPHILS # BLD AUTO: 4.32 X10*3/UL (ref 1.2–7.7)
NEUTROPHILS NFR BLD AUTO: 53.6 %
NRBC BLD-RTO: 0 /100 WBCS (ref 0–0)
PLATELET # BLD AUTO: 212 X10*3/UL (ref 150–450)
POTASSIUM SERPL-SCNC: 3.7 MMOL/L (ref 3.5–5.3)
RBC # BLD AUTO: 4.56 X10*6/UL (ref 4–5.2)
SODIUM SERPL-SCNC: 137 MMOL/L (ref 136–145)
WBC # BLD AUTO: 8.1 X10*3/UL (ref 4.4–11.3)

## 2024-01-08 PROCEDURE — 80048 BASIC METABOLIC PNL TOTAL CA: CPT | Performed by: NURSE PRACTITIONER

## 2024-01-08 PROCEDURE — 2500000002 HC RX 250 W HCPCS SELF ADMINISTERED DRUGS (ALT 637 FOR MEDICARE OP, ALT 636 FOR OP/ED): Performed by: NURSE PRACTITIONER

## 2024-01-08 PROCEDURE — 2500000001 HC RX 250 WO HCPCS SELF ADMINISTERED DRUGS (ALT 637 FOR MEDICARE OP): Performed by: NURSE PRACTITIONER

## 2024-01-08 PROCEDURE — 36415 COLL VENOUS BLD VENIPUNCTURE: CPT | Performed by: NURSE PRACTITIONER

## 2024-01-08 PROCEDURE — 99239 HOSP IP/OBS DSCHRG MGMT >30: CPT | Performed by: STUDENT IN AN ORGANIZED HEALTH CARE EDUCATION/TRAINING PROGRAM

## 2024-01-08 PROCEDURE — 83735 ASSAY OF MAGNESIUM: CPT | Performed by: NURSE PRACTITIONER

## 2024-01-08 PROCEDURE — 2500000004 HC RX 250 GENERAL PHARMACY W/ HCPCS (ALT 636 FOR OP/ED): Performed by: NURSE PRACTITIONER

## 2024-01-08 PROCEDURE — 82947 ASSAY GLUCOSE BLOOD QUANT: CPT

## 2024-01-08 PROCEDURE — 85025 COMPLETE CBC W/AUTO DIFF WBC: CPT | Performed by: NURSE PRACTITIONER

## 2024-01-08 RX ORDER — AMOXICILLIN AND CLAVULANATE POTASSIUM 875; 125 MG/1; MG/1
1 TABLET, FILM COATED ORAL 2 TIMES DAILY
Qty: 20 TABLET | Refills: 0 | Status: SHIPPED | OUTPATIENT
Start: 2024-01-08 | End: 2024-01-18

## 2024-01-08 RX ORDER — AMIODARONE HYDROCHLORIDE 200 MG/1
200 TABLET ORAL DAILY
Qty: 30 TABLET | Refills: 0 | Status: SHIPPED | OUTPATIENT
Start: 2024-01-08 | End: 2024-02-07

## 2024-01-08 RX ADMIN — METOPROLOL TARTRATE 100 MG: 50 TABLET, FILM COATED ORAL at 08:30

## 2024-01-08 RX ADMIN — FOLIC ACID 1 MG: 1 TABLET ORAL at 08:30

## 2024-01-08 RX ADMIN — LEVOTHYROXINE SODIUM 175 MCG: 125 TABLET ORAL at 07:02

## 2024-01-08 RX ADMIN — AMIODARONE HYDROCHLORIDE 200 MG: 200 TABLET ORAL at 08:30

## 2024-01-08 RX ADMIN — APIXABAN 5 MG: 5 TABLET, FILM COATED ORAL at 08:30

## 2024-01-08 RX ADMIN — PANTOPRAZOLE SODIUM 40 MG: 40 TABLET, DELAYED RELEASE ORAL at 07:02

## 2024-01-08 RX ADMIN — ATORVASTATIN CALCIUM 40 MG: 20 TABLET, FILM COATED ORAL at 08:30

## 2024-01-08 RX ADMIN — CYANOCOBALAMIN TAB 500 MCG 1000 MCG: 500 TAB at 08:30

## 2024-01-08 ASSESSMENT — PAIN - FUNCTIONAL ASSESSMENT: PAIN_FUNCTIONAL_ASSESSMENT: 0-10

## 2024-01-08 ASSESSMENT — COGNITIVE AND FUNCTIONAL STATUS - GENERAL
MOVING TO AND FROM BED TO CHAIR: A LITTLE
CLIMB 3 TO 5 STEPS WITH RAILING: A LITTLE
MOBILITY SCORE: 20
WALKING IN HOSPITAL ROOM: A LITTLE
DAILY ACTIVITIY SCORE: 24
DAILY ACTIVITIY SCORE: 24
CLIMB 3 TO 5 STEPS WITH RAILING: A LITTLE
MOVING TO AND FROM BED TO CHAIR: A LITTLE
STANDING UP FROM CHAIR USING ARMS: A LITTLE
MOBILITY SCORE: 20
STANDING UP FROM CHAIR USING ARMS: A LITTLE
WALKING IN HOSPITAL ROOM: A LITTLE

## 2024-01-08 ASSESSMENT — PAIN SCALES - GENERAL: PAINLEVEL_OUTOF10: 0 - NO PAIN

## 2024-01-08 NOTE — DISCHARGE SUMMARY
Discharge Diagnosis  Nausea and vomiting, unspecified vomiting type  1. Nausea and vomiting, unspecified vomiting type    2. Duodenitis    3. Abnormal head CT    4. Lesion of adrenal gland (CMS/HCC)    5. Atrial fibrillation with RVR (CMS/HCC)    6. Edema of left lower extremity    7. Atrial fibrillation with rapid ventricular response (CMS/HCC)       Issues Requiring Follow-Up  Follow-up with your cardiologist in regards to your A-fib and amiodarone  Hold off on antihypertensives as your blood pressure has been low during hospitalization and follow-up with your primary care doctor and your cardiologist      Test Results Pending At Discharge  Pending Labs       No current pending labs.            Hospital Course   Mateo Enriquez is 58 y.o. female Past Medical History of   hypothyroidism status post resection of papillary thyroid cancer, hypertension, A-fib,  class III obesity, and COVID (dx 12/1/23) who presents to hospital due to concerns of intractable nausea and vomiting.  Patient was started on Ozempic approximately 3 weeks ago. Patient was in ICU with Afib with RVR, with HR > 180 with hypotension, patient was on amiodarone drip in ICU and was switched to PO amiodarone and transferred to the floor.  Patient also had Left lower extremity redness and swelling, bilateral lower extremity duplex was negative for DVT, had elevated procalcitonin and elevated white count improved with Rocephin.  ID recommended to switch to p.o. Augmentin on discharge.  Was evaluated by cardiology, echocardiogram was done showed ejection fraction 55 to 60% cardiology recommended to continue amiodarone p.o. and follow-up with cardiology as outpatient.  Patient was hypotensive during hospitalization and losartan and hydrochlorothiazide were put on hold.  Will continue holding both on discharge and patient to follow-up with her PCP and cardiologist regarding resumption as blood pressure is borderline low.  Discharge plan was discussed  with the patient the patient in agreement.  Total discharge time spent 35 minutes.  Pertinent Physical Exam At Time of Discharge  Physical Exam  Constitutional:       General: She is not in acute distress.     Appearance: Normal appearance. She is obese. She is not ill-appearing, toxic-appearing or diaphoretic.   HENT:      Head: Normocephalic and atraumatic.      Mouth/Throat:      Mouth: Mucous membranes are moist.      Pharynx: No oropharyngeal exudate.   Eyes:      Extraocular Movements: Extraocular movements intact.      Pupils: Pupils are equal, round, and reactive to light.   Cardiovascular:      Rate and Rhythm: Tachycardia present. Rhythm irregular.      Heart sounds: No murmur heard.  Pulmonary:      Effort: Pulmonary effort is normal. No respiratory distress.      Breath sounds: Normal breath sounds. No wheezing.   Abdominal:      General: Abdomen is flat. Bowel sounds are normal. There is no distension.      Tenderness: There is no abdominal tenderness. There is no guarding or rebound.   Musculoskeletal:         General: Swelling present.      Right lower leg: No edema.      Left lower leg: Edema present.   Skin:     Findings: Erythema present. No lesion or rash.      Comments: Erythema over the left shin, blister over the lateral side, dressing with Ace wrap   Neurological:      General: No focal deficit present.      Mental Status: She is alert and oriented to person, place, and time.      Cranial Nerves: No cranial nerve deficit.      Motor: No weakness.   Psychiatric:         Mood and Affect: Mood normal.         Behavior: Behavior normal.         Home Medications     Medication List      CONTINUE taking these medications     aspirin 81 mg EC tablet   atorvastatin 40 mg tablet; Commonly known as: Lipitor   cyanocobalamin 1,000 mcg tablet; Commonly known as: Vitamin B-12   Eliquis 5 mg tablet; Generic drug: apixaban   folic acid 800 mcg tablet; Commonly known as: Folvite   gabapentin 100 mg capsule;  Commonly known as: Neurontin   hydroCHLOROthiazide 12.5 mg tablet; Commonly known as: HYDRODiuril   levothyroxine 175 mcg tablet; Commonly known as: Synthroid, Levoxyl   losartan 25 mg tablet; Commonly known as: Cozaar   metoprolol tartrate 100 mg tablet; Commonly known as: Lopressor   Ozempic 0.25 mg or 0.5 mg (2 mg/3 mL) pen injector; Generic drug:   semaglutide   pyridoxine 100 mg tablet; Commonly known as: Vitamin B-6       Outpatient Follow-Up  No future appointments.  Re Spears, APRN-CNP  [address] [id number] [phone]    Schedule an appointment as soon as possible for a visit in 3 week(s)  Call sooner if symptoms worsen    Ministerio Bach MD  [id number] [facility]  [facility] of [facility] [address] [phone]    Call      Kali Hooper MD  [address] [place] [id number] [phone]    Follow up in 2 week(s)        Kenia Engel MD

## 2024-01-12 ENCOUNTER — APPOINTMENT (OUTPATIENT)
Dept: CARDIOLOGY | Facility: HOSPITAL | Age: 59
End: 2024-01-12
Payer: COMMERCIAL

## 2024-01-12 ENCOUNTER — HOSPITAL ENCOUNTER (EMERGENCY)
Facility: HOSPITAL | Age: 59
Discharge: HOME | End: 2024-01-12
Attending: STUDENT IN AN ORGANIZED HEALTH CARE EDUCATION/TRAINING PROGRAM
Payer: COMMERCIAL

## 2024-01-12 VITALS
HEIGHT: 66 IN | HEART RATE: 89 BPM | DIASTOLIC BLOOD PRESSURE: 104 MMHG | SYSTOLIC BLOOD PRESSURE: 162 MMHG | RESPIRATION RATE: 18 BRPM | TEMPERATURE: 96.8 F | WEIGHT: 293 LBS | OXYGEN SATURATION: 97 % | BODY MASS INDEX: 47.09 KG/M2

## 2024-01-12 DIAGNOSIS — T78.40XA ALLERGIC REACTION, INITIAL ENCOUNTER: Primary | ICD-10-CM

## 2024-01-12 PROCEDURE — 2500000004 HC RX 250 GENERAL PHARMACY W/ HCPCS (ALT 636 FOR OP/ED): Performed by: STUDENT IN AN ORGANIZED HEALTH CARE EDUCATION/TRAINING PROGRAM

## 2024-01-12 PROCEDURE — 2500000001 HC RX 250 WO HCPCS SELF ADMINISTERED DRUGS (ALT 637 FOR MEDICARE OP): Performed by: STUDENT IN AN ORGANIZED HEALTH CARE EDUCATION/TRAINING PROGRAM

## 2024-01-12 PROCEDURE — 96374 THER/PROPH/DIAG INJ IV PUSH: CPT

## 2024-01-12 PROCEDURE — 93005 ELECTROCARDIOGRAM TRACING: CPT

## 2024-01-12 PROCEDURE — 99284 EMERGENCY DEPT VISIT MOD MDM: CPT | Performed by: STUDENT IN AN ORGANIZED HEALTH CARE EDUCATION/TRAINING PROGRAM

## 2024-01-12 PROCEDURE — 96375 TX/PRO/DX INJ NEW DRUG ADDON: CPT

## 2024-01-12 RX ORDER — DIPHENHYDRAMINE HYDROCHLORIDE 50 MG/ML
50 INJECTION INTRAMUSCULAR; INTRAVENOUS ONCE
Status: COMPLETED | OUTPATIENT
Start: 2024-01-12 | End: 2024-01-12

## 2024-01-12 RX ORDER — DOXYCYCLINE HYCLATE 100 MG
100 TABLET ORAL ONCE
Status: COMPLETED | OUTPATIENT
Start: 2024-01-12 | End: 2024-01-12

## 2024-01-12 RX ORDER — METHYLPREDNISOLONE 4 MG/1
TABLET ORAL
Qty: 21 TABLET | Refills: 0 | Status: SHIPPED | OUTPATIENT
Start: 2024-01-12 | End: 2024-01-19

## 2024-01-12 RX ORDER — FAMOTIDINE 10 MG/ML
20 INJECTION INTRAVENOUS ONCE
Status: COMPLETED | OUTPATIENT
Start: 2024-01-12 | End: 2024-01-12

## 2024-01-12 RX ORDER — EPINEPHRINE 0.3 MG/.3ML
1 INJECTION SUBCUTANEOUS ONCE AS NEEDED
Qty: 1 EACH | Refills: 0 | Status: SHIPPED | OUTPATIENT
Start: 2024-01-12

## 2024-01-12 RX ORDER — DOXYCYCLINE 100 MG/1
100 CAPSULE ORAL 2 TIMES DAILY
Qty: 20 CAPSULE | Refills: 0 | Status: SHIPPED | OUTPATIENT
Start: 2024-01-12 | End: 2024-01-22

## 2024-01-12 RX ORDER — EPINEPHRINE 0.3 MG/.3ML
1 INJECTION SUBCUTANEOUS ONCE AS NEEDED
Qty: 1 EACH | Refills: 0 | Status: SHIPPED | OUTPATIENT
Start: 2024-01-12 | End: 2024-01-12 | Stop reason: ENTERED-IN-ERROR

## 2024-01-12 RX ORDER — DOXYCYCLINE 100 MG/1
100 TABLET ORAL 2 TIMES DAILY
Qty: 14 TABLET | Refills: 0 | Status: SHIPPED | OUTPATIENT
Start: 2024-01-12 | End: 2024-01-12 | Stop reason: ENTERED-IN-ERROR

## 2024-01-12 RX ADMIN — DIPHENHYDRAMINE HYDROCHLORIDE 50 MG: 50 INJECTION, SOLUTION INTRAMUSCULAR; INTRAVENOUS at 20:15

## 2024-01-12 RX ADMIN — METHYLPREDNISOLONE SODIUM SUCCINATE 125 MG: 125 INJECTION, POWDER, FOR SOLUTION INTRAMUSCULAR; INTRAVENOUS at 20:15

## 2024-01-12 RX ADMIN — FAMOTIDINE 20 MG: 10 INJECTION, SOLUTION INTRAVENOUS at 20:14

## 2024-01-12 RX ADMIN — DOXYCYCLINE HYCLATE 100 MG: 100 TABLET, FILM COATED ORAL at 22:32

## 2024-01-12 ASSESSMENT — PAIN DESCRIPTION - LOCATION: LOCATION: LEG

## 2024-01-12 ASSESSMENT — LIFESTYLE VARIABLES
EVER HAD A DRINK FIRST THING IN THE MORNING TO STEADY YOUR NERVES TO GET RID OF A HANGOVER: NO
REASON UNABLE TO ASSESS: NO
HAVE YOU EVER FELT YOU SHOULD CUT DOWN ON YOUR DRINKING: NO
HAVE PEOPLE ANNOYED YOU BY CRITICIZING YOUR DRINKING: NO
EVER FELT BAD OR GUILTY ABOUT YOUR DRINKING: NO

## 2024-01-12 ASSESSMENT — PAIN SCALES - GENERAL: PAINLEVEL_OUTOF10: 3

## 2024-01-12 ASSESSMENT — PAIN - FUNCTIONAL ASSESSMENT: PAIN_FUNCTIONAL_ASSESSMENT: 0-10

## 2024-01-13 LAB
ATRIAL RATE: 65 BPM
Q ONSET: 222 MS
QRS COUNT: 13 BEATS
QRS DURATION: 66 MS
QT INTERVAL: 400 MS
QTC CALCULATION(BAZETT): 450 MS
QTC FREDERICIA: 432 MS
R AXIS: 24 DEGREES
T AXIS: 33 DEGREES
T OFFSET: 422 MS
VENTRICULAR RATE: 76 BPM

## 2024-01-13 NOTE — ED PROVIDER NOTES
HPI   Chief Complaint   Patient presents with    Allergic Reaction       Patient is a 58-year-old female with history of atrial fibrillation on Eliquis, recent cellulitis, hypertension who presents for suspect allergic reaction.  Patient was recently mid to the hospital and was transition to oral amoxicillin and amiodarone to treat her infection and atrial fibrillation respectively.  Patient is still on metoprolol, however the amiodarone was added given her hypotension while admitted.  Patient states that last night she started having redness and itching on the upper part of her left lower extremity.  States he developed redness and itching on her stomach and on her face today, no shortness of breath, vomiting, diarrhea, lightheadedness, LOC.  States he has been taking her medications as prescribed, however is due for her doses at 8 PM.  No other new medications or exposures. No mouth swelling/tongue swelling, involvement of the vagina.                          No data recorded                Patient History   Past Medical History:   Diagnosis Date    A-fib (CMS/[facility])     Arthritis     Atrial fibrillation with rapid ventricular response (CMS/[facility]) 01/05/2024    Cancer (CMS/[facility])     Disease of thyroid gland     Hypertension      No past surgical history on file.  No family history on file.  Social History     Tobacco Use    Smoking status: Never     Passive exposure: Never    Smokeless tobacco: Never   Vaping Use    Vaping Use: Never used   Substance Use Topics    Alcohol use: Not on file    Drug use: Not on file       Physical Exam   ED Triage Vitals [01/12/24 1914]   Temp Heart Rate Resp BP   36 °C (96.8 °F) 93 20 159/82      SpO2 Temp Source Heart Rate Source Patient Position   100 % Temporal -- --      BP Location FiO2 (%)     -- --       Physical Exam  Constitutional:       General: She is not in acute distress.  HENT:      Head: Normocephalic.   Eyes:      Extraocular Movements: Extraocular movements intact.       Conjunctiva/sclera: Conjunctivae normal.      Pupils: Pupils are equal, round, and reactive to light.   Cardiovascular:      Rate and Rhythm: Normal rate and regular rhythm.      Pulses: Normal pulses.      Heart sounds: Normal heart sounds.   Pulmonary:      Effort: Pulmonary effort is normal.      Breath sounds: Normal breath sounds.   Abdominal:      General: There is no distension.      Palpations: Abdomen is soft. There is no mass.      Tenderness: There is no abdominal tenderness. There is no guarding.   Musculoskeletal:         General: No deformity.      Cervical back: Normal range of motion and neck supple.      Right lower leg: No edema.      Left lower leg: No edema.   Skin:     General: Skin is warm and dry.      Findings: No lesion or rash.      Comments: Red rash with mild excoriations over abdomen, left upper lower extremity, face.  No bullae/crepitus.   Neurological:      General: No focal deficit present.      Mental Status: She is alert and oriented to person, place, and time. Mental status is at baseline.      Cranial Nerves: No cranial nerve deficit.      Sensory: No sensory deficit.      Motor: No weakness.   Psychiatric:         Mood and Affect: Mood normal.         ED Course & MDM   Diagnoses as of 01/13/24 1028   Allergic reaction, initial encounter       Medical Decision Making  EKG my dictation: Rate 76, rhythm regular, axis normal, [place] unavailable, QRS 86, QTc 450, T waves: Unremarkable, ST segments: Elevations depressions, interpretation: Atrial fibrillation, no STEMI    Patient is a 58-year-old female with above-stated past medical history who presents for suspected allergic reaction.  Patient was given famotidine, Solu-Medrol, Benadryl.  She has only involvement of her skin, therefore epinephrine was deferred.  On reevaluation, patient's symptoms have improved, her rash is resolving and she states she feels less itchy.  Patient is clinically well-appearing nontoxic.  I did discuss the  patient's case with cardiology, Dr. Lombardo, regarding cessation of patient's amiodarone.  He stated that the amiodarone was unlikely to leave her system for quite some time given its long half-life and stopping it was unlikely to reduce the risk of this being a allergic agent and it was more likely her Augmentin.  I agree with his assessment.  Patient was given a dose of doxycycline and a prescription for the same.  I did advise her to continue taking amiodarone and follow-up with her cardiologist on Wednesday as she has scheduled.  I gave her prescription for a short course of steroids as well as an epinephrine pen.  I advised her to have the prescription for the EpiPen filled immediately at a 24-hour pharmacy and carry with her at all times and if she needs to use it to call 911 immediately afterward.  Patient stated understanding and agreement. Patient states she is always in A-fib, she is rate controlled and anticoagulated. Patient feels comfortable with the plan.  She was discharged home in stable condition.    Disclaimer: This note was dictated using speech recognition software. Minor errors in transcription may be present. Please call if questions.     Edwardo Madsen MD  [facility]  Contact on [employer]        Problems Addressed:  Allergic reaction, initial encounter: acute illness or injury    Amount and/or Complexity of Data Reviewed  ECG/medicine tests: ordered and independent interpretation performed.        Procedure  Procedures     Edwardo Madsen MD  01/13/24 [id number]

## 2024-01-17 ENCOUNTER — OFFICE VISIT (OUTPATIENT)
Dept: CARDIOLOGY CLINIC | Age: 59
End: 2024-01-17
Payer: COMMERCIAL

## 2024-01-17 VITALS
BODY MASS INDEX: 47.09 KG/M2 | OXYGEN SATURATION: 98 % | HEART RATE: 82 BPM | SYSTOLIC BLOOD PRESSURE: 138 MMHG | WEIGHT: 293 LBS | DIASTOLIC BLOOD PRESSURE: 62 MMHG | HEIGHT: 66 IN

## 2024-01-17 DIAGNOSIS — I48.91 ATRIAL FIBRILLATION, UNSPECIFIED TYPE (HCC): Primary | ICD-10-CM

## 2024-01-17 DIAGNOSIS — I10 ESSENTIAL HYPERTENSION: ICD-10-CM

## 2024-01-17 DIAGNOSIS — I48.0 PAF (PAROXYSMAL ATRIAL FIBRILLATION) (HCC): ICD-10-CM

## 2024-01-17 DIAGNOSIS — L03.116 CELLULITIS OF LEFT LOWER EXTREMITY: ICD-10-CM

## 2024-01-17 DIAGNOSIS — I82.533 CHRONIC DEEP VEIN THROMBOSIS (DVT) OF POPLITEAL VEIN OF BOTH LOWER EXTREMITIES (HCC): ICD-10-CM

## 2024-01-17 DIAGNOSIS — E78.5 DYSLIPIDEMIA: ICD-10-CM

## 2024-01-17 DIAGNOSIS — I25.10 CORONARY ARTERY DISEASE INVOLVING NATIVE CORONARY ARTERY OF NATIVE HEART WITHOUT ANGINA PECTORIS: ICD-10-CM

## 2024-01-17 DIAGNOSIS — I26.02 SADDLE EMBOLUS OF PULMONARY ARTERY WITH ACUTE COR PULMONALE, UNSPECIFIED CHRONICITY (HCC): ICD-10-CM

## 2024-01-17 PROCEDURE — 1036F TOBACCO NON-USER: CPT | Performed by: INTERNAL MEDICINE

## 2024-01-17 PROCEDURE — 3075F SYST BP GE 130 - 139MM HG: CPT | Performed by: INTERNAL MEDICINE

## 2024-01-17 PROCEDURE — 93000 ELECTROCARDIOGRAM COMPLETE: CPT | Performed by: INTERNAL MEDICINE

## 2024-01-17 PROCEDURE — G8417 CALC BMI ABV UP PARAM F/U: HCPCS | Performed by: INTERNAL MEDICINE

## 2024-01-17 PROCEDURE — G8427 DOCREV CUR MEDS BY ELIG CLIN: HCPCS | Performed by: INTERNAL MEDICINE

## 2024-01-17 PROCEDURE — 3017F COLORECTAL CA SCREEN DOC REV: CPT | Performed by: INTERNAL MEDICINE

## 2024-01-17 PROCEDURE — 3078F DIAST BP <80 MM HG: CPT | Performed by: INTERNAL MEDICINE

## 2024-01-17 PROCEDURE — 1111F DSCHRG MED/CURRENT MED MERGE: CPT | Performed by: INTERNAL MEDICINE

## 2024-01-17 PROCEDURE — G8484 FLU IMMUNIZE NO ADMIN: HCPCS | Performed by: INTERNAL MEDICINE

## 2024-01-17 PROCEDURE — 99214 OFFICE O/P EST MOD 30 MIN: CPT | Performed by: INTERNAL MEDICINE

## 2024-01-17 RX ORDER — AMIODARONE HYDROCHLORIDE 200 MG/1
TABLET ORAL
COMMUNITY
Start: 2024-01-08 | End: 2024-01-17 | Stop reason: SDUPTHER

## 2024-01-17 RX ORDER — AMOXICILLIN AND CLAVULANATE POTASSIUM 875; 125 MG/1; MG/1
TABLET, FILM COATED ORAL
COMMUNITY
Start: 2024-01-08 | End: 2024-01-17

## 2024-01-17 RX ORDER — METOPROLOL SUCCINATE 100 MG/1
100 TABLET, EXTENDED RELEASE ORAL 2 TIMES DAILY
Qty: 180 TABLET | Refills: 3
Start: 2024-01-17

## 2024-01-17 RX ORDER — AMIODARONE HYDROCHLORIDE 200 MG/1
200 TABLET ORAL DAILY
Qty: 90 TABLET | Refills: 3 | Status: SHIPPED | OUTPATIENT
Start: 2024-01-17

## 2024-01-17 ASSESSMENT — ENCOUNTER SYMPTOMS
NAUSEA: 0
CHEST TIGHTNESS: 0
GASTROINTESTINAL NEGATIVE: 1
EYES NEGATIVE: 1
BLOOD IN STOOL: 0
COUGH: 0
STRIDOR: 0
SHORTNESS OF BREATH: 1
WHEEZING: 0

## 2024-01-17 NOTE — PROGRESS NOTES
103 12/12/2023 12:45 PM    CO2 30 12/12/2023 12:45 PM    BUN 17 12/12/2023 12:45 PM    LABALBU 3.8 02/23/2023 01:18 PM    CREATININE 1.00 12/12/2023 12:45 PM    CALCIUM 9.1 12/12/2023 12:45 PM    GFRAA 58.4 08/31/2022 03:59 PM    LABGLOM >60.0 12/12/2023 12:45 PM    GLUCOSE 111 12/12/2023 12:45 PM     Magnesium:    Lab Results   Component Value Date/Time    MG 2.1 03/10/2021 09:47 AM     TSH:  Lab Results   Component Value Date    TSH 0.246 (L) 08/31/2022             Patient Active Problem List   Diagnosis    Postoperative hypothyroidism    Papillary thyroid carcinoma (HCC)    PAF (paroxysmal atrial fibrillation) (HCC)    Abnormal stress test    Polyp of colon    ALEYDA (obstructive sleep apnea)       Medications Discontinued During This Encounter   Medication Reason    Semaglutide,0.25 or 0.5MG/DOS, (OZEMPIC, 0.25 OR 0.5 MG/DOSE,) 2 MG/1.5ML SOPN     metFORMIN (GLUCOPHAGE) 500 MG tablet     hydroCHLOROthiazide (HYDRODIURIL) 12.5 MG tablet     losartan (COZAAR) 25 MG tablet     amoxicillin-clavulanate (AUGMENTIN) 875-125 MG per tablet     metoprolol succinate (TOPROL XL) 100 MG extended release tablet REORDER    amiodarone (CORDARONE) 200 MG tablet REORDER         Modified Medications    Modified Medication Previous Medication    AMIODARONE (CORDARONE) 200 MG TABLET amiodarone (CORDARONE) 200 MG tablet       Take 1 tablet by mouth daily    Take 1 tablet (200 mg) by mouth once daily.    METOPROLOL SUCCINATE (TOPROL XL) 100 MG EXTENDED RELEASE TABLET metoprolol succinate (TOPROL XL) 100 MG extended release tablet       Take 1 tablet by mouth in the morning and at bedtime    TAKE 1 & 1/2 (ONE AND ONE-HALF) TABLETS TWICE DAILY       Orders Placed This Encounter   Medications    metoprolol succinate (TOPROL XL) 100 MG extended release tablet     Sig: Take 1 tablet by mouth in the morning and at bedtime     Dispense:  180 tablet     Refill:  3    amiodarone (CORDARONE) 200 MG tablet     Sig: Take 1 tablet by mouth daily

## 2024-01-23 NOTE — DOCUMENTATION CLARIFICATION NOTE
"    PATIENT:               RUIZ LANGE  ACCT #:                  [id number]  MRN:                       [id number]  :                       1965  ADMIT DATE:       2024 7:02 PM  DISCH DATE:        2024 3:50 PM  RESPONDING PROVIDER #:        [id number]          PROVIDER RESPONSE TEXT:    Nausea and vomiting unrelated to Ozempic    CDI QUERY TEXT:    [facility]_Etiology Linkage        Instruction:    Based on your assessment of the patient and the clinical information, please provide the requested documentation by clicking on the appropriate radio button and enter any additional information if prompted.    Question: Please clarify if a relationship exists between    When answering this query, please exercise your independent professional judgment. The fact that a question is being asked, does not imply that any particular answer is desired or expected.    The patient's clinical indicators include:  Clinical Information: 57 yo female admitted with nausea, vomiting, cellulitis of left lower limb, AFIB, Hypotension. PMH: CKD3, HTN    Clinical Indicators:  Vitals (2132) Temp-37.2 HR-114 RR-22 BP-97/60 SPO2-96 RA    WBC: 20.3 (1920), 20.6 (1/3 0437), 14.7 (341), 8.5 (7), 6.6 (1), 6.4 (0650), 8.1 ( 0415)  Neutrophils absolute: 18.19 (1920), 18.30 (1/3 0437), 12.43 (1), 6.00 ( 0407), 3.94 (421), 3.51 ( 0650), 4.32 ( 0415)  Lactate: 2.6 (1920), 2.1 (2005), 1.3 (1/3 1008)  COVID19: detected ()    CTAP (): IMPRESSION:  1. Mild stranding about the descending duodenum. Clinical correlation  for signs of duodenitis recommended. Acute pancreatitis considered  less likely but not excluded.     DCN Dr. Waldron: Discharge diagnosis \"Duodenitis\"     PN Dr. Abeibie: \"presents to hospital due to concerns of intractable nausea and vomiting. Patient was started on Ozempic approximately 3 weeks ago\"     Ministerio Salvador NP: \"Intractable nausea and vomiting " "suspected to be d/t ozempic, improved since admission  -CT shows possible duodenitis  -GI c/s, continue PPI, signed off today\"    1/3 Estherruma Hooks GI consult: \"reports nausea and intermittent upper abdominal pain since starting new medication. However, she came to the ER after she started vomiting yesterday with worsening upper abdominal pain described as twisting and squeezing.\"    Treatment: GI consult, monitoring    Risk Factors: Ozempic, Nause, vomiting  Options provided:  -- Nausea and vomiting related to Ozempic  -- Nausea and vomiting unrelated to Ozempic  -- Other - I will add my own diagnosis  -- Refer to Clinical Documentation Reviewer    Query created by: Ivanna Perez on 1/16/2024 1:32 PM      Electronically signed by:  JENI VIZCAINO MD 1/23/2024 9:26 AM          "

## 2024-01-23 NOTE — DOCUMENTATION CLARIFICATION NOTE
"    PATIENT:               RUIZ LANGE  ACCT #:                  [id number]  MRN:                       [id number]  :                       1965  ADMIT DATE:       2024 7:02 PM  DISCH DATE:        2024 3:50 PM  RESPONDING PROVIDER #:        [id number]          PROVIDER RESPONSE TEXT:    COVID-19 is a history of    CDI QUERY TEXT:    UH_Covid Recovered Clarification    Instruction:    Based on your assessment of the patient and the clinical information, please provide the requested documentation by clicking on the appropriate radio button and enter any additional information if prompted.    Question: Please further clarify the diagnosis of Covid-19    When answering this query, please exercise your independent professional judgment. The fact that a question is being asked, does not imply that any particular answer is desired or expected.    The patient's clinical indicators include:  Clinical Information: 57 yo female admitted with nausea, vomiting, cellulitis of left lower limb, AFIB, Hypotension. PMH: CKD3, HTN    Clinical Indicators:  Vitals (2132) Temp-37.2 HR-114 RR-22 BP-97/60 SPO2-96 RA    WBC: 20.3 (0), 20.6 (1/3 0437), 14.7 (341), 8.5 ( 0407), 6.6 ( 0421), 6.4 (0650), 8.1 ( 0415)  Neutrophils absolute: 18.19 (0), 18.30 (1/3 0437), 12.43 ( 0341), 6.00 ( 0407), 3.94 ( 042), 3.51 ( 0650), 4.32 ( 0415)  Lactate: 2.6 (1920), 2.1 (2005), 1.3 (1/3 100)  COVID19: detected ()     DCN Dr. Engel: \"COVID (dx 23)\"     Re Spears: \"Patient should follow up with GI outpatient in 3-4 weeks once she recovers from COVID-19 infection and cardiac issues are resolved. Positive for COVID early December, still testing positive\"     Deisy Ragsdale NP: \"COVID dx early December, not steroid or remdesivir candidate\"     HP Dr. Landers: \"Diagnosis: COVID +\"     CXR: IMPRESSION:  1.  No evidence of acute cardiopulmonary " process.    Treatment: monitoring    Risk Factors: COVID19  Options provided:  -- COVID-19 is a history of  -- Active COVID-19 infection  -- COVID-19 infection resolved  -- Other - I will add my own diagnosis  -- Refer to Clinical Documentation Reviewer    Query created by: Ivanna Perez on 1/16/2024 2:06 PM      Electronically signed by:  JENI VIZCAINO MD 1/23/2024 9:26 AM

## 2024-01-23 NOTE — DOCUMENTATION CLARIFICATION NOTE
"    PATIENT:               RUIZ LANGE  ACCT #:                  [id number]  MRN:                       [id number]  :                       1965  ADMIT DATE:       2024 7:02 PM  DISCH DATE:        2024 3:50 PM  RESPONDING PROVIDER #:        [id number]          PROVIDER RESPONSE TEXT:    Sepsis was a differential diagnosis and ruled out after study    CDI QUERY TEXT:    UH_CV Sepsis    Instruction:  Based on your assessment of the patient and the clinical information, please provide the requested documentation by clicking on the appropriate radio button and enter any additional information if prompted.    Question: Sepsis was documented in the medical record. Based on the documentation and the clinical information, can the diagnosis be further clarified as    When answering this query, please exercise your independent professional judgment. The fact that a question is being asked, does not imply that any particular answer is desired or expected.    The patient's clinical indicators include:  Clinical Information: 59 yo female admitted with nausea, vomiting, cellulitis of left lower limb, AFIB, Hypotension. PMH: CKD3 baseline creatinine 1.4, HTN    Documented Diagnosis: \"Sepsis\" 23 Dr. Pinto ID PN    Clinical Indicators:  Vitals (2132) Temp-37.2 HR-114 RR-22 BP-97/60 SPO2-96 RA    -WBC: 20.3 (1920), 20.6 (1/3 0437), 14.7 (341), 8.5 (407), 6.6 (421), 6.4 (0650), 8.1 (415)  -Neutrophils absolute: 18.19 (1920), 18.30 (1/3 0437), 12.43 (341), 6.00 (407), 3.94 (421), 3.51 (0650), 4.32  (415)  -Blood glucose: 146 (1920), 146 (1/3 0437), 133 (341), 90 (407), 87 (421), 82 (0650), 98 (414)  -Lactate: 2.6 (1920), 2.1 (2005), 1.3 (1/3 1008)  -COVID19: detected ()  -BUN/Creat: 32/1.63 (1920), 31/1.64 (1/3 0437), 27/1.53 (341), 21/1.31 ( 040), 22/1.32 ( 042), 18/1.19 (  0650), 16/1.12 ( " "0721)  -Blood cultures: NGTD (1/2)  -Bilirubin: 1.4 (1/2 1920)  -MAP: 87 (1/3 0033), 75 (1/4 0435), 66 (1/4 1200)  -Procalcitonin: 4.37 (1/3 0437)  -EKG: Atrial fibrillation with rapid ventricular response ST & T wave abnormality, consider inferolateral ischemia Abnormal ECG (1/2)    CTAP (1/2): IMPRESSION:  1. Mild stranding about the descending duodenum. Clinical correlation  for signs of duodenitis recommended. Acute pancreatitis considered  less likely but not excluded.    1/6 ID consult Dr. Pinto: \"Cellulitis Sepsis- presumably Strep vs pasteurella with this toxicity and history\"    1/4 HP CC Deisy Ragsdale NP: \"WBC elevated with left shift and procalcitonin 4.37. Still unclear etiology but suspect possible LLE cellulitis, duodenitis. Start on Unasyn IV for now.\"    1/5 PN Goran Torres: \"LLE cellulitis starting to blister\"    Treatment: ID consult, Ceftriaxone 2g IV q 24, NS bolus 500 ml x3, NS bolus 1000 ml, Unasyn 3g IV q 6, Augmentin 875 mg PO q 12    Risk Factors:  cellulitis, duodenitis  Options provided:  -- Sepsis was a differential diagnosis and ruled out after study  -- Sepsis with acute hepatic organ dysfunction of elevated bilirubin  -- Sepsis with acute endocrine organ dysfunction of elevated blood glucose with no history of diabetes  -- Sepsis with multi-system organ dysfunction of elevated BG with no history of diabetes, elevated bilirubin  -- Sepsis with other organ dysfunction, Please specify additional information below  -- Other - I will add my own diagnosis  -- Refer to Clinical Documentation Reviewer    Query created by: Ivanna Perez on 1/16/2024 1:58 PM      Electronically signed by:  JENI VIZCAINO MD 1/23/2024 9:26 AM          "

## 2024-01-24 ENCOUNTER — OFFICE VISIT (OUTPATIENT)
Dept: PULMONOLOGY | Age: 59
End: 2024-01-24
Payer: COMMERCIAL

## 2024-01-24 ENCOUNTER — HOSPITAL ENCOUNTER (OUTPATIENT)
Dept: WOUND CARE | Age: 59
Discharge: HOME | End: 2024-01-24
Payer: COMMERCIAL

## 2024-01-24 VITALS
DIASTOLIC BLOOD PRESSURE: 115 MMHG | TEMPERATURE: 96.6 F | SYSTOLIC BLOOD PRESSURE: 152 MMHG | RESPIRATION RATE: 18 BRPM | HEART RATE: 75 BPM

## 2024-01-24 VITALS
WEIGHT: 293 LBS | OXYGEN SATURATION: 96 % | HEART RATE: 82 BPM | BODY MASS INDEX: 51.97 KG/M2 | SYSTOLIC BLOOD PRESSURE: 140 MMHG | DIASTOLIC BLOOD PRESSURE: 86 MMHG

## 2024-01-24 DIAGNOSIS — I87.332 CHRONIC VENOUS HYPERTENSION (IDIOPATHIC) WITH ULCER AND INFLAMMATION OF LEFT LOWER EXTREMITY (HCC): Primary | ICD-10-CM

## 2024-01-24 DIAGNOSIS — I27.82 CHRONIC SADDLE PULMONARY EMBOLISM WITHOUT ACUTE COR PULMONALE (HCC): ICD-10-CM

## 2024-01-24 DIAGNOSIS — E66.01 MORBID OBESITY (HCC): ICD-10-CM

## 2024-01-24 DIAGNOSIS — G47.33 OSA (OBSTRUCTIVE SLEEP APNEA): Primary | ICD-10-CM

## 2024-01-24 DIAGNOSIS — I26.92 CHRONIC SADDLE PULMONARY EMBOLISM WITHOUT ACUTE COR PULMONALE (HCC): ICD-10-CM

## 2024-01-24 PROCEDURE — 11042 DBRDMT SUBQ TIS 1ST 20SQCM/<: CPT

## 2024-01-24 PROCEDURE — 99214 OFFICE O/P EST MOD 30 MIN: CPT | Performed by: INTERNAL MEDICINE

## 2024-01-24 PROCEDURE — 1036F TOBACCO NON-USER: CPT | Performed by: INTERNAL MEDICINE

## 2024-01-24 PROCEDURE — 3017F COLORECTAL CA SCREEN DOC REV: CPT | Performed by: INTERNAL MEDICINE

## 2024-01-24 PROCEDURE — G8417 CALC BMI ABV UP PARAM F/U: HCPCS | Performed by: INTERNAL MEDICINE

## 2024-01-24 PROCEDURE — G8484 FLU IMMUNIZE NO ADMIN: HCPCS | Performed by: INTERNAL MEDICINE

## 2024-01-24 PROCEDURE — G8427 DOCREV CUR MEDS BY ELIG CLIN: HCPCS | Performed by: INTERNAL MEDICINE

## 2024-01-24 PROCEDURE — 11042 DBRDMT SUBQ TIS 1ST 20SQCM/<: CPT | Performed by: SURGERY

## 2024-01-24 RX ORDER — TRIAMCINOLONE ACETONIDE 1 MG/G
OINTMENT TOPICAL ONCE
OUTPATIENT
Start: 2024-01-24 | End: 2024-01-24

## 2024-01-24 RX ORDER — LIDOCAINE HYDROCHLORIDE 20 MG/ML
JELLY TOPICAL ONCE
OUTPATIENT
Start: 2024-01-24 | End: 2024-01-24

## 2024-01-24 RX ORDER — GINSENG 100 MG
CAPSULE ORAL ONCE
OUTPATIENT
Start: 2024-01-24 | End: 2024-01-24

## 2024-01-24 RX ORDER — GENTAMICIN SULFATE 1 MG/G
OINTMENT TOPICAL ONCE
OUTPATIENT
Start: 2024-01-24 | End: 2024-01-24

## 2024-01-24 RX ORDER — LIDOCAINE 40 MG/G
CREAM TOPICAL ONCE
OUTPATIENT
Start: 2024-01-24 | End: 2024-01-24

## 2024-01-24 RX ORDER — IBUPROFEN 200 MG
TABLET ORAL ONCE
OUTPATIENT
Start: 2024-01-24 | End: 2024-01-24

## 2024-01-24 RX ORDER — LIDOCAINE HYDROCHLORIDE 40 MG/ML
SOLUTION TOPICAL ONCE
OUTPATIENT
Start: 2024-01-24 | End: 2024-01-24

## 2024-01-24 RX ORDER — SODIUM CHLOR/HYPOCHLOROUS ACID 0.033 %
SOLUTION, IRRIGATION IRRIGATION ONCE
OUTPATIENT
Start: 2024-01-24 | End: 2024-01-24

## 2024-01-24 RX ORDER — CLOBETASOL PROPIONATE 0.5 MG/G
OINTMENT TOPICAL ONCE
OUTPATIENT
Start: 2024-01-24 | End: 2024-01-24

## 2024-01-24 RX ORDER — BACITRACIN ZINC AND POLYMYXIN B SULFATE 500; 1000 [USP'U]/G; [USP'U]/G
OINTMENT TOPICAL ONCE
OUTPATIENT
Start: 2024-01-24 | End: 2024-01-24

## 2024-01-24 RX ORDER — LIDOCAINE 50 MG/G
OINTMENT TOPICAL ONCE
OUTPATIENT
Start: 2024-01-24 | End: 2024-01-24

## 2024-01-24 RX ORDER — BETAMETHASONE DIPROPIONATE 0.5 MG/G
CREAM TOPICAL ONCE
OUTPATIENT
Start: 2024-01-24 | End: 2024-01-24

## 2024-01-24 ASSESSMENT — ENCOUNTER SYMPTOMS
ABDOMINAL PAIN: 0
TROUBLE SWALLOWING: 0
WHEEZING: 0
SINUS PRESSURE: 0
SHORTNESS OF BREATH: 0
RHINORRHEA: 0
SORE THROAT: 0
EYE ITCHING: 0
NAUSEA: 0
VOICE CHANGE: 0
EYE DISCHARGE: 0
CHEST TIGHTNESS: 0
COUGH: 0
DIARRHEA: 0
VOMITING: 0

## 2024-01-24 ASSESSMENT — PAIN DESCRIPTION - LOCATION: LOCATION: LEG

## 2024-01-24 ASSESSMENT — PAIN DESCRIPTION - DESCRIPTORS: DESCRIPTORS: BURNING

## 2024-01-24 ASSESSMENT — PAIN DESCRIPTION - ORIENTATION: ORIENTATION: LEFT

## 2024-01-24 ASSESSMENT — PAIN SCALES - GENERAL: PAINLEVEL_OUTOF10: 2

## 2024-01-24 ASSESSMENT — PAIN DESCRIPTION - FREQUENCY: FREQUENCY: INTERMITTENT

## 2024-01-24 ASSESSMENT — PAIN DESCRIPTION - PAIN TYPE: TYPE: ACUTE PAIN

## 2024-01-24 NOTE — PROGRESS NOTES
[facility]                                                   Progress Note and Procedure Note      Manolo Poe  MEDICAL RECORD NUMBER:  [id number]  AGE: 58 y.o.   GENDER: female  : 1965  EPISODE DATE:  2024    Subjective:     Chief Complaint   Patient presents with    Wound Check         HISTORY of PRESENT ILLNESS HPI     Manolo Poe is a 58 y.o. female who presents today for wound/ulcer evaluation.   History of Wound Context: Patient was recently admitted to [facility] due to atrial fibrillation.  While in the hospital she was diagnosed with cellulitis of her left lower extremity resulting in multiple ulcerations.      Wound/Ulcer Pain Timing/Severity: none  Quality of pain: N/A  Severity:  0 / 10   Modifying Factors: None  Associated Signs/Symptoms: erythema    Ulcer Identification:  Ulcer Type: venous  Contributing Factors: edema and obesity    Wound: N/A        PAST MEDICAL HISTORY        Diagnosis Date    Atrial fibrillation (HCC)     Hypertension     PE (pulmonary thromboembolism) (HCC) 2020    Ventricular tachycardia (HCC)        PAST SURGICAL HISTORY    Past Surgical History:   Procedure Laterality Date    APPENDECTOMY      CARDIOVERSION  2021    CHOLECYSTECTOMY      COLONOSCOPY N/A 2021    COLONOSCOPY DIAGNOSTIC WITH POLYPECTOMY performed by Sergo Olivier MD at [facility]    GALLBLADDER SURGERY      THYROIDECTOMY      TONSILLECTOMY AND ADENOIDECTOMY      TUBAL LIGATION         FAMILY HISTORY    Family History   Problem Relation Age of Onset    Stroke Mother         CVA    Diabetes Mother     Heart Disease Father     Heart Failure Sister     Colon Cancer Neg Hx     Breast Cancer Neg Hx        SOCIAL HISTORY    Social History     Tobacco Use    Smoking status: Former     Current packs/day: 0.00     Average packs/day: 1.5 packs/day for 35.0 years (52.5 ttl pk-yrs)     Types: Cigarettes     Start date:      Quit date: 2014     Years since

## 2024-01-24 NOTE — PROGRESS NOTES
Subjective:             Manolo Poe is a 58 y.o. female who complains today of:     Chief Complaint   Patient presents with    Follow-up     4m f/u on ALEYDA       HPI  She is using CPAP with 10 centimeters of H2O with heated humidity.  She is using CPAP for about 6-7  hours every night.  She is using CPAP with  Full face  Mask. No need for CPAP Supply   She said  sleep is restful with the CPAP use.  She is compliant with CPAP therapy and benefiting with CPAP use.  No snoring with CPAP use.  No complaint of daytime sleepiness or tiredness with CPAP use.  She denies taking naps.  No sleepiness with driving.   She denies difficulty falling asleep or staying asleep.  DME is medical services     I reviewed compliance report with patient regarding CPAP therapy. She is using  CPAP for 24 days out of 30 days.  Average usage of days used is 6 hours and 39 min , average AHI 0.9 with CPAP use.        Allergies:  Amoxicillin and Lisinopril  Past Medical History:   Diagnosis Date    Atrial fibrillation (HCC)     Hypertension     PE (pulmonary thromboembolism) (HCC) 11/2020    Ventricular tachycardia (HCC)      Past Surgical History:   Procedure Laterality Date    APPENDECTOMY      CARDIOVERSION  03/16/2021    CHOLECYSTECTOMY      COLONOSCOPY N/A 07/21/2021    COLONOSCOPY DIAGNOSTIC WITH POLYPECTOMY performed by Sergo Olivier MD at [facility]    GALLBLADDER SURGERY      THYROIDECTOMY      TONSILLECTOMY AND ADENOIDECTOMY      TUBAL LIGATION       Family History   Problem Relation Age of Onset    Stroke Mother         CVA    Diabetes Mother     Heart Disease Father     Heart Failure Sister     Colon Cancer Neg Hx     Breast Cancer Neg Hx      Social History     Socioeconomic History    Marital status:      Spouse name: Not on file    Number of children: Not on file    Years of education: Not on file    Highest education level: Not on file   Occupational History    Not on file   Tobacco Use    Smoking status:

## 2024-01-24 NOTE — DISCHARGE INSTRUCTIONS
[facility]   Physician Orders and Discharge Instructions  [facility]  [address] [place]  [address]  Telephone: [phone]      [phone]      NAME:  Manolo Poe          YOB: 1965  MEDICAL RECORD NUMBER:  [id number]    Your  is:  [facility] Care/Facility: None    Wound Location: Left Lower Leg    Dressing orders:  1.Cleanse wound(s) with normal saline.  2. Apply dry HYDROFERA BLUE READY FOAM or equivalent to wound bed.  NOTE: If your HYDROFRERA BLUE is not soft and pliable, moisten with saline until it is sponge like and then ring out excess saline and apply to wound bed.  3. Cover with 4x4's and wrap with gauze (claudio or kerlix)  4. Change  Every other day or Monday, Wednesday, and Friday     Compression: Apply medium compression hose to  LEFT lower leg(s), may remove at bedtime, reapply first thing in the morning, avoid prolonged standing, elevate legs when sitting.     Offloading Device: None    Other Instructions: Please call us if the wound gets worse (redness, warmth, pain)    Keep all dressings clean, dry and intact.  Keep pressure off the wound(s) at all times.     Follow up visit   1 Weeks: January 31, 2024 @ 11:00 am    Please give 24 hour notice if unable to keep appointment. [phone]    If you experience any of the following, please call the Wound Care Service at  [phone] or go to the nearest emergency room.   *Increase in pain *Temperature over 101 *Increase in drainage from your wound or a foul odor  *Uncontrolled swelling *Need for compression bandage changes due to slippage, breakthrough drainage       PLEASE NOTE: IF YOU ARE UNABLE TO OBTAIN WOUND SUPPLIES, CONTINUE TO USE THE SUPPLIES YOU HAVE AVAILABLE UNTIL YOU ARE ABLE TO REACH US. IT IS MOST IMPORTANT TO KEEP THE WOUND COVERED AT ALL TIMES    Electronically signed by Cinthia Bruner MD on 1/24/2024 at 11:39

## 2024-01-24 NOTE — PLAN OF CARE
Problem: Pain  Goal: Verbalizes/displays adequate comfort level or baseline comfort level  Outcome: Progressing     Problem: Wound:  Goal: Will show signs of wound healing; wound closure and no evidence of infection  Outcome: Progressing

## 2024-01-31 ENCOUNTER — HOSPITAL ENCOUNTER (OUTPATIENT)
Dept: WOUND CARE | Age: 59
Discharge: HOME | End: 2024-01-31
Payer: COMMERCIAL

## 2024-01-31 VITALS
HEART RATE: 69 BPM | DIASTOLIC BLOOD PRESSURE: 97 MMHG | TEMPERATURE: 97.1 F | SYSTOLIC BLOOD PRESSURE: 178 MMHG | RESPIRATION RATE: 18 BRPM

## 2024-01-31 DIAGNOSIS — I87.332 CHRONIC VENOUS HYPERTENSION (IDIOPATHIC) WITH ULCER AND INFLAMMATION OF LEFT LOWER EXTREMITY (HCC): Primary | ICD-10-CM

## 2024-01-31 PROCEDURE — 97597 DBRDMT OPN WND 1ST 20 CM/<: CPT

## 2024-01-31 PROCEDURE — 6370000000 HC RX 637 (ALT 250 FOR IP): Performed by: SURGERY

## 2024-01-31 RX ORDER — LIDOCAINE HYDROCHLORIDE 20 MG/ML
JELLY TOPICAL ONCE
Status: COMPLETED | OUTPATIENT
Start: 2024-01-31 | End: 2024-01-31

## 2024-01-31 RX ORDER — LIDOCAINE HYDROCHLORIDE 40 MG/ML
SOLUTION TOPICAL ONCE
OUTPATIENT
Start: 2024-01-31 | End: 2024-01-31

## 2024-01-31 RX ORDER — LIDOCAINE 40 MG/G
CREAM TOPICAL ONCE
OUTPATIENT
Start: 2024-01-31 | End: 2024-01-31

## 2024-01-31 RX ORDER — SODIUM CHLOR/HYPOCHLOROUS ACID 0.033 %
SOLUTION, IRRIGATION IRRIGATION ONCE
OUTPATIENT
Start: 2024-01-31 | End: 2024-01-31

## 2024-01-31 RX ORDER — BACITRACIN ZINC AND POLYMYXIN B SULFATE 500; 1000 [USP'U]/G; [USP'U]/G
OINTMENT TOPICAL ONCE
OUTPATIENT
Start: 2024-01-31 | End: 2024-01-31

## 2024-01-31 RX ORDER — LIDOCAINE 50 MG/G
OINTMENT TOPICAL ONCE
OUTPATIENT
Start: 2024-01-31 | End: 2024-01-31

## 2024-01-31 RX ORDER — TRIAMCINOLONE ACETONIDE 1 MG/G
OINTMENT TOPICAL ONCE
OUTPATIENT
Start: 2024-01-31 | End: 2024-01-31

## 2024-01-31 RX ORDER — LIDOCAINE HYDROCHLORIDE 20 MG/ML
JELLY TOPICAL ONCE
OUTPATIENT
Start: 2024-01-31 | End: 2024-01-31

## 2024-01-31 RX ORDER — BETAMETHASONE DIPROPIONATE 0.5 MG/G
CREAM TOPICAL ONCE
OUTPATIENT
Start: 2024-01-31 | End: 2024-01-31

## 2024-01-31 RX ORDER — CLOBETASOL PROPIONATE 0.5 MG/G
OINTMENT TOPICAL ONCE
OUTPATIENT
Start: 2024-01-31 | End: 2024-01-31

## 2024-01-31 RX ORDER — GENTAMICIN SULFATE 1 MG/G
OINTMENT TOPICAL ONCE
OUTPATIENT
Start: 2024-01-31 | End: 2024-01-31

## 2024-01-31 RX ORDER — GINSENG 100 MG
CAPSULE ORAL ONCE
OUTPATIENT
Start: 2024-01-31 | End: 2024-01-31

## 2024-01-31 RX ORDER — IBUPROFEN 200 MG
TABLET ORAL ONCE
OUTPATIENT
Start: 2024-01-31 | End: 2024-01-31

## 2024-01-31 RX ADMIN — LIDOCAINE HYDROCHLORIDE: 20 JELLY TOPICAL at 11:09

## 2024-01-31 ASSESSMENT — PAIN DESCRIPTION - ORIENTATION: ORIENTATION: LEFT

## 2024-01-31 ASSESSMENT — PAIN SCALES - GENERAL: PAINLEVEL_OUTOF10: 5

## 2024-01-31 ASSESSMENT — PAIN DESCRIPTION - DESCRIPTORS: DESCRIPTORS: BURNING

## 2024-01-31 NOTE — PLAN OF CARE
Problem: Cognitive:  Goal: Knowledge of wound care  Description: Knowledge of wound care  Outcome: Progressing  Goal: Understands risk factors for wounds  Description: Understands risk factors for wounds  Outcome: Progressing

## 2024-01-31 NOTE — PROGRESS NOTES
COVERED AT ALL TIMES    Electronically signed by Cinthia Bruner MD on 1/31/2024 at 11:34 AM          Electronically signed by Cinthia Bruner MD on 1/31/2024 at 11:34 AM

## 2024-01-31 NOTE — DISCHARGE INSTRUCTIONS
[facility]   Physician Orders and Discharge Instructions  [facility]  [address] [place]  [address]  Telephone: [phone]      [phone]        NAME:  Manolo Poe                                                                                           YOB: 1965  MEDICAL RECORD NUMBER:  [id number]     Your  is:  [facility] Care/Facility: None     Wound Location: Left Lower Leg     Dressing orders:  1.Cleanse wound(s) with normal saline.  2. Apply dry HYDROFERA BLUE READY FOAM or equivalent to wound bed.  NOTE: If your HYDROFRERA BLUE is not soft and pliable, moisten with saline until it is sponge like and then ring out excess saline and apply to wound bed.  3. Cover with 4x4's and wrap with gauze (claudio or kerlix)  4. Change  Every other day or Monday, Wednesday, and Friday      Compression: Apply medium compression hose to LEFT lower leg(s), may remove at bedtime, reapply first thing in the morning, avoid prolonged standing, elevate legs when sitting. (53)     Offloading Device: None     Other Instructions:      Keep all dressings clean, dry and intact.  Keep pressure off the wound(s) at all times.      Follow up visit   1 Weeks: February 7, 2024 @ 10:45 am     Please give 24 hour notice if unable to keep appointment. [phone]     If you experience any of the following, please call the Wound Care Service at  [phone] or go to the nearest emergency room.        *Increase in pain         *Temperature over 101           *Increase in drainage from your wound or a foul odor  *Uncontrolled swelling            *Need for compression bandage changes due to slippage, breakthrough drainage       PLEASE NOTE: IF YOU ARE UNABLE TO OBTAIN WOUND SUPPLIES, CONTINUE TO USE THE SUPPLIES YOU HAVE AVAILABLE UNTIL YOU ARE ABLE TO REACH US. IT IS MOST IMPORTANT TO KEEP THE WOUND COVERED AT ALL

## 2024-02-07 ENCOUNTER — HOSPITAL ENCOUNTER (OUTPATIENT)
Dept: WOUND CARE | Age: 59
Discharge: HOME | End: 2024-02-07
Payer: COMMERCIAL

## 2024-02-07 VITALS
RESPIRATION RATE: 18 BRPM | TEMPERATURE: 97.1 F | HEART RATE: 83 BPM | DIASTOLIC BLOOD PRESSURE: 115 MMHG | SYSTOLIC BLOOD PRESSURE: 165 MMHG

## 2024-02-07 DIAGNOSIS — I87.332 CHRONIC VENOUS HYPERTENSION (IDIOPATHIC) WITH ULCER AND INFLAMMATION OF LEFT LOWER EXTREMITY (HCC): Primary | ICD-10-CM

## 2024-02-07 PROCEDURE — 99212 OFFICE O/P EST SF 10 MIN: CPT | Performed by: SURGERY

## 2024-02-07 PROCEDURE — 99212 OFFICE O/P EST SF 10 MIN: CPT

## 2024-02-07 RX ORDER — LIDOCAINE HYDROCHLORIDE 40 MG/ML
SOLUTION TOPICAL ONCE
OUTPATIENT
Start: 2024-02-07 | End: 2024-02-07

## 2024-02-07 RX ORDER — BACITRACIN ZINC AND POLYMYXIN B SULFATE 500; 1000 [USP'U]/G; [USP'U]/G
OINTMENT TOPICAL ONCE
OUTPATIENT
Start: 2024-02-07 | End: 2024-02-07

## 2024-02-07 RX ORDER — IBUPROFEN 200 MG
TABLET ORAL ONCE
OUTPATIENT
Start: 2024-02-07 | End: 2024-02-07

## 2024-02-07 RX ORDER — LIDOCAINE 50 MG/G
OINTMENT TOPICAL ONCE
OUTPATIENT
Start: 2024-02-07 | End: 2024-02-07

## 2024-02-07 RX ORDER — TRIAMCINOLONE ACETONIDE 1 MG/G
OINTMENT TOPICAL ONCE
OUTPATIENT
Start: 2024-02-07 | End: 2024-02-07

## 2024-02-07 RX ORDER — LIDOCAINE HYDROCHLORIDE 20 MG/ML
JELLY TOPICAL ONCE
OUTPATIENT
Start: 2024-02-07 | End: 2024-02-07

## 2024-02-07 RX ORDER — CLOBETASOL PROPIONATE 0.5 MG/G
OINTMENT TOPICAL ONCE
OUTPATIENT
Start: 2024-02-07 | End: 2024-02-07

## 2024-02-07 RX ORDER — BETAMETHASONE DIPROPIONATE 0.5 MG/G
CREAM TOPICAL ONCE
OUTPATIENT
Start: 2024-02-07 | End: 2024-02-07

## 2024-02-07 RX ORDER — SODIUM CHLOR/HYPOCHLOROUS ACID 0.033 %
SOLUTION, IRRIGATION IRRIGATION ONCE
OUTPATIENT
Start: 2024-02-07 | End: 2024-02-07

## 2024-02-07 RX ORDER — LIDOCAINE 40 MG/G
CREAM TOPICAL ONCE
OUTPATIENT
Start: 2024-02-07 | End: 2024-02-07

## 2024-02-07 RX ORDER — GENTAMICIN SULFATE 1 MG/G
OINTMENT TOPICAL ONCE
OUTPATIENT
Start: 2024-02-07 | End: 2024-02-07

## 2024-02-07 RX ORDER — GINSENG 100 MG
CAPSULE ORAL ONCE
OUTPATIENT
Start: 2024-02-07 | End: 2024-02-07

## 2024-02-07 ASSESSMENT — PAIN DESCRIPTION - ORIENTATION: ORIENTATION: LEFT

## 2024-02-07 ASSESSMENT — PAIN DESCRIPTION - PAIN TYPE: TYPE: ACUTE PAIN

## 2024-02-07 ASSESSMENT — PAIN DESCRIPTION - DESCRIPTORS: DESCRIPTORS: BURNING

## 2024-02-07 ASSESSMENT — PAIN DESCRIPTION - LOCATION: LOCATION: LEG

## 2024-02-07 ASSESSMENT — PAIN SCALES - GENERAL: PAINLEVEL_OUTOF10: 4

## 2024-02-07 NOTE — DISCHARGE INSTRUCTIONS
[facility] Physician Orders and Discharge Instructions  [address] [address]  Telephone: [phone]     FAX [phone]    NAME:  Manolo Poe  YOB: 1965  MEDICAL RECORD NUMBER:  [id number]  DATE:  2/7/2024    Congratulations!! You have completed your treatment.   1. Return to your Primary Care Physician for all your health issues.   2. Resume your ordinary activities as tolerated.   3. Take your medications as prescribed by your primary care physician.   4. Check your skin daily for cracks, bruises, sores, or dryness. Use a moisturizer as needed. (Eucerin or Aquaphor)  5. Clean and dry your skin, using mild soap and warm water (not hot).   6. Avoid alcohol and caffeine and do not smoke.   7. Maintain a nutritious diet.   8. Avoid pressure on your wound site. Keep your legs elevated above the level of the heart whenever possible.   9. Continue to use wraps/stockings/compression as prescribed. - Follow up with Dr. Bruner's office for compression hose  10. Replace compression stockings every four to six months as needed to ensure proper fit.   11. Wear well-fitting shoes and leg garments.     THANK YOU FOR ALLOWING US TO SERVE YOU. PLEASE CALL IF YOU DEVELOP ANOTHER WOUND. [phone]           Electronically signed by Eli Candelaria RN on 2/7/2024 at 11:07 AM     Electronically signed by Cinthia Bruner MD on 2/7/2024 at 11:15 AM

## 2024-02-07 NOTE — PROGRESS NOTES
[facility]                                                   Progress Note and Procedure Note      Manolo Poe  MEDICAL RECORD NUMBER:  [id number]  AGE: 58 y.o.   GENDER: female  : 1965  EPISODE DATE:  2024    Subjective:     Chief Complaint   Patient presents with    Wound Check         HISTORY of PRESENT ILLNESS HPI     Manolo Poe is a 58 y.o. female who presents today for wound/ulcer evaluation.   History of Wound Context: Patient was recently admitted to [facility] due to atrial fibrillation.  While in the hospital she was diagnosed with cellulitis of her left lower extremity resulting in multiple ulcerations.      Wound/Ulcer Pain Timing/Severity: none  Quality of pain: N/A  Severity:  0 / 10   Modifying Factors: None  Associated Signs/Symptoms: erythema    Ulcer Identification:  Ulcer Type: venous  Contributing Factors: edema and obesity    Wound: N/A        PAST MEDICAL HISTORY        Diagnosis Date    Atrial fibrillation (HCC)     Hypertension     PE (pulmonary thromboembolism) (HCC) 2020    Ventricular tachycardia (HCC)        PAST SURGICAL HISTORY    Past Surgical History:   Procedure Laterality Date    APPENDECTOMY      CARDIOVERSION  2021    CHOLECYSTECTOMY      COLONOSCOPY N/A 2021    COLONOSCOPY DIAGNOSTIC WITH POLYPECTOMY performed by Sergo Olivier MD at [facility]    GALLBLADDER SURGERY      THYROIDECTOMY      TONSILLECTOMY AND ADENOIDECTOMY      TUBAL LIGATION         FAMILY HISTORY    Family History   Problem Relation Age of Onset    Stroke Mother         CVA    Diabetes Mother     Heart Disease Father     Heart Failure Sister     Colon Cancer Neg Hx     Breast Cancer Neg Hx        SOCIAL HISTORY    Social History     Tobacco Use    Smoking status: Former     Current packs/day: 0.00     Average packs/day: 1.5 packs/day for 35.0 years (52.5 ttl pk-yrs)     Types: Cigarettes     Start date:      Quit date: 2014     Years since

## 2024-02-28 RX ORDER — ATORVASTATIN CALCIUM 40 MG/1
40 TABLET, FILM COATED ORAL DAILY
Qty: 90 TABLET | Refills: 3 | Status: SHIPPED | OUTPATIENT
Start: 2024-02-28

## 2024-02-28 NOTE — TELEPHONE ENCOUNTER
Requesting medication refill. Please approve or deny this request.    Rx requested:  Requested Prescriptions     Pending Prescriptions Disp Refills    atorvastatin (LIPITOR) 40 MG tablet [Pharmacy Med Name: atorvastatin 40 mg tablet] 90 tablet 3     Sig: TAKE 1 TABLET BY MOUTH EVERY DAY         Last Office Visit:   1/17/2024      Next Visit Date:  Future Appointments   Date Time Provider Department Center   3/13/2024  2:00 PM Barney Ty MD [facility]   4/17/2024  3:00 PM Hugo Herrera MD [facility]   6/12/2024  1:15 PM Patrick Preston MD [facility]               Last refill 03/06/2023. Please approve or deny.

## 2024-03-06 DIAGNOSIS — R73.03 PREDIABETES: ICD-10-CM

## 2024-03-06 DIAGNOSIS — E03.9 HYPOTHYROIDISM, UNSPECIFIED TYPE: ICD-10-CM

## 2024-03-06 LAB
ANION GAP SERPL CALCULATED.3IONS-SCNC: 12 MEQ/L (ref 9–15)
BUN SERPL-MCNC: 18 MG/DL (ref 6–20)
CALCIUM SERPL-MCNC: 10 MG/DL (ref 8.5–9.9)
CHLORIDE SERPL-SCNC: 101 MEQ/L (ref 95–107)
CO2 SERPL-SCNC: 30 MEQ/L (ref 20–31)
CREAT SERPL-MCNC: 1.13 MG/DL (ref 0.5–0.9)
GLUCOSE SERPL-MCNC: 89 MG/DL (ref 70–99)
HBA1C MFR BLD: 5.6 % (ref 4.8–5.9)
POTASSIUM SERPL-SCNC: 3.8 MEQ/L (ref 3.4–4.9)
SODIUM SERPL-SCNC: 143 MEQ/L (ref 135–144)
T4 FREE SERPL-MCNC: 2.11 NG/DL (ref 0.84–1.68)
TSH SERPL-MCNC: 3.53 UIU/ML (ref 0.44–3.86)

## 2024-03-08 LAB — THYROPEROXIDASE IGG SERPL-ACNC: <4 IU/ML (ref 0–25)

## 2024-03-10 LAB — THYROGLOB SERPL-MCNC: <0.5 NG/ML (ref 1.3–31.8)

## 2024-03-13 ENCOUNTER — OFFICE VISIT (OUTPATIENT)
Dept: ENDOCRINOLOGY | Age: 59
End: 2024-03-13
Payer: COMMERCIAL

## 2024-03-13 VITALS
BODY MASS INDEX: 47.09 KG/M2 | DIASTOLIC BLOOD PRESSURE: 82 MMHG | HEART RATE: 82 BPM | WEIGHT: 293 LBS | HEIGHT: 66 IN | OXYGEN SATURATION: 95 % | SYSTOLIC BLOOD PRESSURE: 137 MMHG

## 2024-03-13 DIAGNOSIS — E66.01 MORBID OBESITY (HCC): ICD-10-CM

## 2024-03-13 DIAGNOSIS — C73 PAPILLARY THYROID CARCINOMA (HCC): ICD-10-CM

## 2024-03-13 DIAGNOSIS — R73.03 PREDIABETES: Primary | ICD-10-CM

## 2024-03-13 DIAGNOSIS — E03.9 HYPOTHYROIDISM, UNSPECIFIED TYPE: ICD-10-CM

## 2024-03-13 LAB
CHP ED QC CHECK: NORMAL
GLUCOSE BLD-MCNC: 101 MG/DL

## 2024-03-13 PROCEDURE — G8484 FLU IMMUNIZE NO ADMIN: HCPCS | Performed by: INTERNAL MEDICINE

## 2024-03-13 PROCEDURE — 99213 OFFICE O/P EST LOW 20 MIN: CPT | Performed by: INTERNAL MEDICINE

## 2024-03-13 PROCEDURE — 1036F TOBACCO NON-USER: CPT | Performed by: INTERNAL MEDICINE

## 2024-03-13 PROCEDURE — 3017F COLORECTAL CA SCREEN DOC REV: CPT | Performed by: INTERNAL MEDICINE

## 2024-03-13 PROCEDURE — 82962 GLUCOSE BLOOD TEST: CPT | Performed by: INTERNAL MEDICINE

## 2024-03-13 PROCEDURE — G8427 DOCREV CUR MEDS BY ELIG CLIN: HCPCS | Performed by: INTERNAL MEDICINE

## 2024-03-13 PROCEDURE — G8417 CALC BMI ABV UP PARAM F/U: HCPCS | Performed by: INTERNAL MEDICINE

## 2024-03-13 RX ORDER — HYDROCHLOROTHIAZIDE 12.5 MG/1
12.5 TABLET ORAL DAILY
COMMUNITY
Start: 2024-01-29

## 2024-03-13 RX ORDER — SEMAGLUTIDE 0.68 MG/ML
INJECTION, SOLUTION SUBCUTANEOUS
Qty: 4 ML | Refills: 3 | Status: SHIPPED | OUTPATIENT
Start: 2024-03-13

## 2024-03-13 RX ORDER — LOSARTAN POTASSIUM 25 MG/1
25 TABLET ORAL DAILY
COMMUNITY
Start: 2024-02-28

## 2024-03-13 RX ORDER — SEMAGLUTIDE 0.68 MG/ML
INJECTION, SOLUTION SUBCUTANEOUS
COMMUNITY
End: 2024-03-13 | Stop reason: SDUPTHER

## 2024-03-13 RX ORDER — LEVOTHYROXINE SODIUM 175 UG/1
TABLET ORAL
Qty: 90 TABLET | Refills: 3 | Status: SHIPPED | OUTPATIENT
Start: 2024-03-13

## 2024-03-13 NOTE — PROGRESS NOTES
Physical Exam  Vitals reviewed.   Constitutional:       General: She is not in acute distress.     Appearance: Normal appearance. She is obese.   HENT:      Head: Normocephalic and atraumatic.      Right Ear: External ear normal.      Left Ear: External ear normal.      Nose: Nose normal.   Eyes:      General: No scleral icterus.        Right eye: No discharge.         Left eye: No discharge.      Extraocular Movements: Extraocular movements intact.      Conjunctiva/sclera: Conjunctivae normal.   Cardiovascular:      Rate and Rhythm: Normal rate.   Pulmonary:      Effort: Pulmonary effort is normal.   Musculoskeletal:         General: Normal range of motion.      Cervical back: Normal range of motion and neck supple.   Neurological:      General: No focal deficit present.      Mental Status: She is alert and oriented to person, place, and time.   Psychiatric:         Mood and Affect: Mood normal.         Behavior: Behavior normal.

## 2024-03-14 DIAGNOSIS — G62.9 PERIPHERAL POLYNEUROPATHY: ICD-10-CM

## 2024-03-14 RX ORDER — GABAPENTIN 100 MG/1
CAPSULE ORAL
Qty: 30 CAPSULE | Refills: 3 | Status: SHIPPED | OUTPATIENT
Start: 2024-03-14 | End: 2024-04-13

## 2024-03-21 ENCOUNTER — TELEPHONE (OUTPATIENT)
Dept: ENDOCRINOLOGY | Age: 59
End: 2024-03-21

## 2024-03-27 RX ORDER — SEMAGLUTIDE 1.34 MG/ML
INJECTION, SOLUTION SUBCUTANEOUS
Qty: 3 ML | Refills: 4 | Status: SHIPPED | OUTPATIENT
Start: 2024-03-27

## 2024-04-17 ENCOUNTER — OFFICE VISIT (OUTPATIENT)
Dept: CARDIOLOGY CLINIC | Age: 59
End: 2024-04-17
Payer: COMMERCIAL

## 2024-04-17 VITALS
BODY MASS INDEX: 47.09 KG/M2 | HEART RATE: 80 BPM | SYSTOLIC BLOOD PRESSURE: 130 MMHG | DIASTOLIC BLOOD PRESSURE: 76 MMHG | HEIGHT: 66 IN | WEIGHT: 293 LBS | OXYGEN SATURATION: 95 %

## 2024-04-17 DIAGNOSIS — I25.10 CORONARY ARTERY DISEASE INVOLVING NATIVE CORONARY ARTERY OF NATIVE HEART WITHOUT ANGINA PECTORIS: ICD-10-CM

## 2024-04-17 DIAGNOSIS — I48.91 ATRIAL FIBRILLATION, UNSPECIFIED TYPE (HCC): Primary | ICD-10-CM

## 2024-04-17 DIAGNOSIS — E78.5 DYSLIPIDEMIA: ICD-10-CM

## 2024-04-17 DIAGNOSIS — I10 ESSENTIAL HYPERTENSION: ICD-10-CM

## 2024-04-17 DIAGNOSIS — I48.0 PAF (PAROXYSMAL ATRIAL FIBRILLATION) (HCC): ICD-10-CM

## 2024-04-17 DIAGNOSIS — I26.02 SADDLE EMBOLUS OF PULMONARY ARTERY WITH ACUTE COR PULMONALE, UNSPECIFIED CHRONICITY (HCC): ICD-10-CM

## 2024-04-17 DIAGNOSIS — I82.533 CHRONIC DEEP VEIN THROMBOSIS (DVT) OF POPLITEAL VEIN OF BOTH LOWER EXTREMITIES (HCC): ICD-10-CM

## 2024-04-17 DIAGNOSIS — L03.116 CELLULITIS OF LEFT LOWER EXTREMITY: ICD-10-CM

## 2024-04-17 PROCEDURE — 3075F SYST BP GE 130 - 139MM HG: CPT | Performed by: INTERNAL MEDICINE

## 2024-04-17 PROCEDURE — G8427 DOCREV CUR MEDS BY ELIG CLIN: HCPCS | Performed by: INTERNAL MEDICINE

## 2024-04-17 PROCEDURE — 3078F DIAST BP <80 MM HG: CPT | Performed by: INTERNAL MEDICINE

## 2024-04-17 PROCEDURE — 3017F COLORECTAL CA SCREEN DOC REV: CPT | Performed by: INTERNAL MEDICINE

## 2024-04-17 PROCEDURE — 1036F TOBACCO NON-USER: CPT | Performed by: INTERNAL MEDICINE

## 2024-04-17 PROCEDURE — G8417 CALC BMI ABV UP PARAM F/U: HCPCS | Performed by: INTERNAL MEDICINE

## 2024-04-17 PROCEDURE — 99214 OFFICE O/P EST MOD 30 MIN: CPT | Performed by: INTERNAL MEDICINE

## 2024-04-17 PROCEDURE — 93000 ELECTROCARDIOGRAM COMPLETE: CPT | Performed by: INTERNAL MEDICINE

## 2024-04-17 ASSESSMENT — ENCOUNTER SYMPTOMS
CHEST TIGHTNESS: 0
EYES NEGATIVE: 1
WHEEZING: 0
COUGH: 0
BLOOD IN STOOL: 0
STRIDOR: 0
GASTROINTESTINAL NEGATIVE: 1
NAUSEA: 0
SHORTNESS OF BREATH: 1

## 2024-04-17 NOTE — PROGRESS NOTES
OFFICE VISIT         Patient: Manolo Poe  YOB: 1965  MRN: [id number]    Chief Complaint: DVT PE AF BECKWITH   Chief Complaint   Patient presents with    3 Month Follow-Up    Atrial Fibrillation       CV Data:  11/2020 Unprovoked B/L DVT and Saddle Embolism   Thyroid cancer s/p Thyroidectomy   4/21 SPECT abn anterior   5/12/21 Cath LAD - Aneurysmal dilation with moderate sequential lesions 50-60 and  IFR negative.  EF 60       Subjective/HPI pt is SOB with any ADLs. No cp currently. Compliant with all meds. No bleed. No falls. haad AF since PE.  Was seeing Dr. Becerra but changing. There were plans for CVN and Antiarrhythmic    3/23/21 still winded with ADLs. No pain. Went back into AF. No cp tired.     5/3/21 still BECKWITH no cp no bleed. No falls takes meds    6/23/21 still Beckwith no cp takes meds. No falls no bleed. Having pain with heel spurrs.     10/20/21 DOING WELL NO CP NO SOB No falls no bleed takes meds. Discomfort from heel spurs.     2/23/22 doing well no cp no sob she can not sense AF. No bleed. Takes meds.     6/29/22 still BECKWITH no cp gaining weight no cp \    10/31/22 doing well no cp no sob no palps now nop fa;lls nmo bleed. Take smeds.     3/1/23 since lowering synthroid feels much better and less palps. No cp no sob no falls no bleed.     7/5/23 doing well no new events no cp same beckwith no falls no bleed     11/22/23 doing well no cp no sob no falls no bleed. Takes meds. Had recent left leg injury and edema.  US dozen and no DVT. On DOAC.    1/17/24 recent Cellulitis and sepsis at [facility].  They stopped Losartan HCTZ and lowered BB to 100 bid. Added Amiodarone.   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 No cp no sob no falls no bleed.     Former smoker  No ETOH  Lives with  and kids  ++FH     EKG: SR 78 QTc 418    Past Medical History:   Diagnosis Date    Atrial fibrillation (HCC)     Hypertension     PE (pulmonary thromboembolism) (HCC) 11/2020    Ventricular

## 2024-06-04 DIAGNOSIS — I10 ESSENTIAL HYPERTENSION: ICD-10-CM

## 2024-06-05 RX ORDER — METOPROLOL SUCCINATE 100 MG/1
100 TABLET, EXTENDED RELEASE ORAL 2 TIMES DAILY
Qty: 180 TABLET | Refills: 3 | Status: SHIPPED | OUTPATIENT
Start: 2024-06-05

## 2024-06-05 NOTE — TELEPHONE ENCOUNTER
Dose changed. Per LOV patient taking Toprol XL 100mg 1 tablet BID. Refill sent for above dosage.     Requesting medication refill. Please approve or deny this request.    Rx requested:  Requested Prescriptions     Pending Prescriptions Disp Refills    metoprolol succinate (TOPROL XL) 100 MG extended release tablet [Pharmacy Med Name: metoprolol succinate  mg tablet,extended release 24 hr] 270 tablet 2     Sig: take 1&1/2 TABLETS BY MOUTH TWICE DAILY         Last Office Visit:   4/17/2024      Next Visit Date:  Future Appointments   Date Time Provider Department Center   6/12/2024  1:15 PM Patrick Preston MD [facility]   6/12/2024  2:15 PM Barney Ty MD [facility]   8/21/2024  1:45 PM Hugo Herrera MD [facility]               Last refill 01/17/2024. Please approve or deny.

## 2024-06-10 DIAGNOSIS — R73.03 PREDIABETES: ICD-10-CM

## 2024-06-10 DIAGNOSIS — E03.9 HYPOTHYROIDISM, UNSPECIFIED TYPE: ICD-10-CM

## 2024-06-10 LAB
ANION GAP SERPL CALCULATED.3IONS-SCNC: 10 MEQ/L (ref 9–15)
BUN SERPL-MCNC: 20 MG/DL (ref 6–20)
CALCIUM SERPL-MCNC: 9.4 MG/DL (ref 8.5–9.9)
CHLORIDE SERPL-SCNC: 103 MEQ/L (ref 95–107)
CO2 SERPL-SCNC: 29 MEQ/L (ref 20–31)
CREAT SERPL-MCNC: 1.28 MG/DL (ref 0.5–0.9)
GLUCOSE SERPL-MCNC: 87 MG/DL (ref 70–99)
POTASSIUM SERPL-SCNC: 4 MEQ/L (ref 3.4–4.9)
SODIUM SERPL-SCNC: 142 MEQ/L (ref 135–144)
T4 FREE SERPL-MCNC: 1.73 NG/DL (ref 0.84–1.68)
TSH SERPL-MCNC: 2.41 UIU/ML (ref 0.44–3.86)

## 2024-06-11 LAB
ESTIMATED AVERAGE GLUCOSE: 117 MG/DL
HBA1C MFR BLD: 5.7 % (ref 4–6)

## 2024-06-12 ENCOUNTER — OFFICE VISIT (OUTPATIENT)
Dept: PULMONOLOGY | Age: 59
End: 2024-06-12
Payer: COMMERCIAL

## 2024-06-12 ENCOUNTER — OFFICE VISIT (OUTPATIENT)
Dept: ENDOCRINOLOGY | Age: 59
End: 2024-06-12
Payer: COMMERCIAL

## 2024-06-12 VITALS
BODY MASS INDEX: 49.1 KG/M2 | WEIGHT: 293 LBS | OXYGEN SATURATION: 95 % | HEART RATE: 73 BPM | DIASTOLIC BLOOD PRESSURE: 84 MMHG | SYSTOLIC BLOOD PRESSURE: 126 MMHG

## 2024-06-12 VITALS
WEIGHT: 293 LBS | HEART RATE: 70 BPM | BODY MASS INDEX: 48.82 KG/M2 | DIASTOLIC BLOOD PRESSURE: 84 MMHG | HEIGHT: 65 IN | OXYGEN SATURATION: 94 % | SYSTOLIC BLOOD PRESSURE: 126 MMHG

## 2024-06-12 DIAGNOSIS — R73.03 PREDIABETES: Primary | ICD-10-CM

## 2024-06-12 DIAGNOSIS — I27.82 CHRONIC SADDLE PULMONARY EMBOLISM WITHOUT ACUTE COR PULMONALE (HCC): ICD-10-CM

## 2024-06-12 DIAGNOSIS — E66.01 MORBID OBESITY (HCC): ICD-10-CM

## 2024-06-12 DIAGNOSIS — L65.9 ALOPECIA: ICD-10-CM

## 2024-06-12 DIAGNOSIS — I26.92 CHRONIC SADDLE PULMONARY EMBOLISM WITHOUT ACUTE COR PULMONALE (HCC): ICD-10-CM

## 2024-06-12 DIAGNOSIS — E03.9 HYPOTHYROIDISM, UNSPECIFIED TYPE: ICD-10-CM

## 2024-06-12 DIAGNOSIS — C73 PAPILLARY THYROID CARCINOMA (HCC): ICD-10-CM

## 2024-06-12 DIAGNOSIS — G47.33 OSA (OBSTRUCTIVE SLEEP APNEA): Primary | ICD-10-CM

## 2024-06-12 PROCEDURE — G8417 CALC BMI ABV UP PARAM F/U: HCPCS | Performed by: INTERNAL MEDICINE

## 2024-06-12 PROCEDURE — G8427 DOCREV CUR MEDS BY ELIG CLIN: HCPCS | Performed by: INTERNAL MEDICINE

## 2024-06-12 PROCEDURE — 82962 GLUCOSE BLOOD TEST: CPT | Performed by: INTERNAL MEDICINE

## 2024-06-12 PROCEDURE — 1036F TOBACCO NON-USER: CPT | Performed by: INTERNAL MEDICINE

## 2024-06-12 PROCEDURE — 3017F COLORECTAL CA SCREEN DOC REV: CPT | Performed by: INTERNAL MEDICINE

## 2024-06-12 PROCEDURE — 99214 OFFICE O/P EST MOD 30 MIN: CPT | Performed by: INTERNAL MEDICINE

## 2024-06-12 PROCEDURE — 99213 OFFICE O/P EST LOW 20 MIN: CPT | Performed by: INTERNAL MEDICINE

## 2024-06-12 RX ORDER — SEMAGLUTIDE 1.34 MG/ML
INJECTION, SOLUTION SUBCUTANEOUS
Qty: 3 ML | Refills: 4 | Status: SHIPPED | OUTPATIENT
Start: 2024-06-12

## 2024-06-12 RX ORDER — LEVOTHYROXINE SODIUM 175 UG/1
TABLET ORAL
Qty: 90 TABLET | Refills: 3 | Status: SHIPPED | OUTPATIENT
Start: 2024-06-12

## 2024-06-12 ASSESSMENT — ENCOUNTER SYMPTOMS
SORE THROAT: 0
RHINORRHEA: 0
COUGH: 0
SINUS PRESSURE: 0
CHEST TIGHTNESS: 0
VOMITING: 0
WHEEZING: 0
TROUBLE SWALLOWING: 0
EYE ITCHING: 0
ABDOMINAL PAIN: 0
DIARRHEA: 0
SHORTNESS OF BREATH: 0
NAUSEA: 0
EYE DISCHARGE: 0
VOICE CHANGE: 0

## 2024-06-12 NOTE — PROGRESS NOTES
2024    Assessment:       Diagnosis Orders   1. Prediabetes  POCT Glucose    Basic Metabolic Panel    Hemoglobin A1C    Semaglutide, 1 MG/DOSE, (OZEMPIC, 1 MG/DOSE,) 4 MG/3ML SOPN sc injection    metFORMIN (GLUCOPHAGE) 500 MG tablet      2. Hypothyroidism, unspecified type  T4, Free    TSH    levothyroxine (SYNTHROID) 175 MCG tablet      3. Alopecia        4. Papillary thyroid carcinoma (HCC)  Thyroglobulin    Anti-Thyroglobulin Antibody            PLAN:     Orders Placed This Encounter   Procedures    Basic Metabolic Panel     Standing Status:   Future     Standing Expiration Date:   2025    Hemoglobin A1C     Standing Status:   Future     Standing Expiration Date:   2025    T4, Free     Standing Status:   Future     Standing Expiration Date:   2025    TSH     Standing Status:   Future     Standing Expiration Date:   2025    Thyroglobulin     Standing Status:   Future     Standing Expiration Date:   2025    Anti-Thyroglobulin Antibody     Standing Status:   Future     Standing Expiration Date:   2025    POCT Glucose     Orders Placed This Encounter   Medications    Semaglutide, 1 MG/DOSE, (OZEMPIC, 1 MG/DOSE,) 4 MG/3ML SOPN sc injection     Si mg once a week     Dispense:  3 mL     Refill:  4    metFORMIN (GLUCOPHAGE) 500 MG tablet     Si tablet with a meal Orally Twice a day     Dispense:  60 tablet     Refill:  3    levothyroxine (SYNTHROID) 175 MCG tablet     Si po daily     Dispense:  90 tablet     Refill:  3   Increase dose of Ozempic advised patient to try over-the-counter Rogaine follow-up in 3 to 6 months      Orders Placed This Encounter   Procedures    POCT Glucose     No orders of the defined types were placed in this encounter.    No follow-ups on file.  Subjective:     Chief Complaint   Patient presents with    Prediabetes    Hypothyroidism     Vitals:    24 1449   BP: 126/84   Pulse: 70   SpO2: 94%   Weight: (!) 137.4 kg (303 lb)   Height: 1.651 m

## 2024-06-12 NOTE — PROGRESS NOTES
advised for extreme caution when driving or operating machinery if there is a feeling of drowsiness, especially while driving it is preferable to stop driving and take a brief nap.  Sleep hygiene:Avoid supine sleep, sleep on  sides. Avoid  sleep deprivation.  Explained sleep hygiene.  Advice to avoid Alcohol and sedative    Time spend over 30 min. Face to face.with greater than 50 % time with CPAP therapy including review compliance, counseling and advised regarding CPAP therapy.       2. Morbid obesity (HCC)  She is advised try to lose weight. obesity related risk explained to the patient ,  Current weight:  (!) 137.9 kg (304 lb) Lbs. BMI:  Body mass index is 49.1 kg/m².  Suggested weight control approaches, including dietary changes , exercise, behavioral modification.    3. 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      Patrick Preston MD

## 2024-06-19 ASSESSMENT — ENCOUNTER SYMPTOMS: HAIR LOSS: 1

## 2024-06-24 DIAGNOSIS — I48.0 PAF (PAROXYSMAL ATRIAL FIBRILLATION) (HCC): ICD-10-CM

## 2024-06-24 DIAGNOSIS — I10 ESSENTIAL HYPERTENSION: ICD-10-CM

## 2024-06-24 NOTE — TELEPHONE ENCOUNTER
Requesting medication refill. Please approve or deny this request.    Rx requested:  Requested Prescriptions     Pending Prescriptions Disp Refills    apixaban (ELIQUIS) 5 MG TABS tablet 180 tablet 3     Sig: Take 1 tablet by mouth 2 times daily         Last Office Visit:   4/17/2024      Next Visit Date:  Future Appointments   Date Time Provider Department Center   8/21/2024  1:45 PM Hugo Herrera MD [facility]   9/18/2024  2:30 PM Barney Ty MD [facility]   11/13/2024  1:45 PM Patrick Preston MD [facility]

## 2024-07-17 DIAGNOSIS — G62.9 PERIPHERAL POLYNEUROPATHY: ICD-10-CM

## 2024-07-17 RX ORDER — GABAPENTIN 100 MG/1
100 CAPSULE ORAL DAILY
Qty: 30 CAPSULE | Refills: 3 | Status: SHIPPED | OUTPATIENT
Start: 2024-07-17 | End: 2024-08-16

## 2024-07-21 ENCOUNTER — HOSPITAL ENCOUNTER (INPATIENT)
Age: 59
LOS: 3 days | Discharge: HOME HEALTH | End: 2024-07-25
Attending: INTERNAL MEDICINE | Admitting: INTERNAL MEDICINE
Payer: COMMERCIAL

## 2024-07-21 DIAGNOSIS — A41.9 SEPSIS, DUE TO UNSPECIFIED ORGANISM, UNSPECIFIED WHETHER ACUTE ORGAN DYSFUNCTION PRESENT (HCC): ICD-10-CM

## 2024-07-21 DIAGNOSIS — L03.116 CELLULITIS OF LEFT LOWER EXTREMITY WITHOUT FOOT: ICD-10-CM

## 2024-07-21 DIAGNOSIS — S81.852A CAT BITE OF LEFT LOWER LEG, INITIAL ENCOUNTER: Primary | ICD-10-CM

## 2024-07-21 DIAGNOSIS — L03.116 CELLULITIS OF LEFT LEG: ICD-10-CM
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
## 2024-07-21 LAB
BACTERIA URNS QL MICRO: NEGATIVE /HPF
BILIRUB UR QL STRIP: NEGATIVE
CLARITY UR: ABNORMAL
COLOR UR: YELLOW
EPI CELLS #/AREA URNS AUTO: ABNORMAL /HPF (ref 0–5)
GLUCOSE UR STRIP-MCNC: NEGATIVE MG/DL
HGB UR QL STRIP: ABNORMAL
HYALINE CASTS #/AREA URNS AUTO: ABNORMAL /HPF (ref 0–5)
KETONES UR STRIP-MCNC: ABNORMAL MG/DL
LEUKOCYTE ESTERASE UR QL STRIP: NEGATIVE
NITRITE UR QL STRIP: NEGATIVE
PH UR STRIP: 5 [PH] (ref 5–9)
PROT UR STRIP-MCNC: >=300 MG/DL
RBC #/AREA URNS AUTO: ABNORMAL /HPF (ref 0–5)
SP GR UR STRIP: 1.02 (ref 1–1.03)
URINE REFLEX TO CULTURE: ABNORMAL
UROBILINOGEN UR STRIP-ACNC: 0.2 E.U./DL
WBC #/AREA URNS AUTO: ABNORMAL /HPF (ref 0–5)

## 2024-07-21 PROCEDURE — 96375 TX/PRO/DX INJ NEW DRUG ADDON: CPT

## 2024-07-21 PROCEDURE — 99285 EMERGENCY DEPT VISIT HI MDM: CPT

## 2024-07-21 PROCEDURE — 05HD33Z INSERTION OF INFUSION DEVICE INTO RIGHT CEPHALIC VEIN, PERCUTANEOUS APPROACH: ICD-10-PCS | Performed by: INTERNAL MEDICINE

## 2024-07-21 PROCEDURE — 81001 URINALYSIS AUTO W/SCOPE: CPT

## 2024-07-21 PROCEDURE — 36415 COLL VENOUS BLD VENIPUNCTURE: CPT

## 2024-07-21 PROCEDURE — 80053 COMPREHEN METABOLIC PANEL: CPT

## 2024-07-21 PROCEDURE — 85025 COMPLETE CBC W/AUTO DIFF WBC: CPT

## 2024-07-21 PROCEDURE — 83605 ASSAY OF LACTIC ACID: CPT

## 2024-07-21 PROCEDURE — 96374 THER/PROPH/DIAG INJ IV PUSH: CPT

## 2024-07-21 RX ORDER — 0.9 % SODIUM CHLORIDE 0.9 %
1000 INTRAVENOUS SOLUTION INTRAVENOUS ONCE
Status: COMPLETED | OUTPATIENT
Start: 2024-07-21 | End: 2024-07-22

## 2024-07-21 RX ORDER — METRONIDAZOLE 500 MG/100ML
500 INJECTION, SOLUTION INTRAVENOUS ONCE
Status: COMPLETED | OUTPATIENT
Start: 2024-07-21 | End: 2024-07-22

## 2024-07-21 ASSESSMENT — LIFESTYLE VARIABLES
HOW MANY STANDARD DRINKS CONTAINING ALCOHOL DO YOU HAVE ON A TYPICAL DAY: PATIENT DOES NOT DRINK
HOW OFTEN DO YOU HAVE A DRINK CONTAINING ALCOHOL: NEVER

## 2024-07-22 PROBLEM — S81.859A: Status: ACTIVE | Noted: 2024-07-22
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
## 2024-07-22 PROBLEM — L08.9: Status: ACTIVE | Noted: 2024-07-22

## 2024-07-22 LAB
A BAUMANNII DNA BLD POS QL NAA+NON-PROBE: NOT DETECTED
ALBUMIN SERPL-MCNC: 3.4 G/DL (ref 3.5–4.6)
ALBUMIN SERPL-MCNC: 3.8 G/DL (ref 3.5–4.6)
ALP SERPL-CCNC: 132 U/L (ref 40–130)
ALP SERPL-CCNC: 153 U/L (ref 40–130)
ALT SERPL-CCNC: 28 U/L (ref 0–33)
ALT SERPL-CCNC: 34 U/L (ref 0–33)
ANION GAP SERPL CALCULATED.3IONS-SCNC: 10 MEQ/L (ref 9–15)
ANION GAP SERPL CALCULATED.3IONS-SCNC: 8 MEQ/L (ref 9–15)
AST SERPL-CCNC: 27 U/L (ref 0–35)
AST SERPL-CCNC: 29 U/L (ref 0–35)
BACTERIA BLD CULT ORG #2: ABNORMAL
BACTERIA BLD CULT: ABNORMAL
BASOPHILS # BLD: 0.1 K/UL (ref 0–0.2)
BASOPHILS NFR BLD: 0.5 %
BILIRUB SERPL-MCNC: 0.6 MG/DL (ref 0.2–0.7)
BILIRUB SERPL-MCNC: 0.6 MG/DL (ref 0.2–0.7)
BUN SERPL-MCNC: 21 MG/DL (ref 6–20)
BUN SERPL-MCNC: 25 MG/DL (ref 6–20)
C ALBICANS DNA BLD POS QL NAA+NON-PROBE: NOT DETECTED
C AURIS DNA BLD POS QL NAA+PROBE: NOT DETECTED
C GLABRATA DNA BLD POS QL NAA+NON-PROBE: NOT DETECTED
C KRUSEI DNA BLD POS QL NAA+NON-PROBE: NOT DETECTED
C PARAP DNA BLD POS QL NAA+NON-PROBE: NOT DETECTED
C TROPICLS DNA BLD POS QL NAA+NON-PROBE: NOT DETECTED
CALCIUM SERPL-MCNC: 8.8 MG/DL (ref 8.5–9.9)
CALCIUM SERPL-MCNC: 9.5 MG/DL (ref 8.5–9.9)
CHLORIDE SERPL-SCNC: 103 MEQ/L (ref 95–107)
CHLORIDE SERPL-SCNC: 105 MEQ/L (ref 95–107)
CO2 SERPL-SCNC: 26 MEQ/L (ref 20–31)
CO2 SERPL-SCNC: 28 MEQ/L (ref 20–31)
CREAT SERPL-MCNC: 1.23 MG/DL (ref 0.5–0.9)
CREAT SERPL-MCNC: 1.34 MG/DL (ref 0.5–0.9)
CRYPTOCOCCUS NEOFORMANS/GATTII BY PCR: NOT DETECTED
E CLOAC COMP DNA BLD POS NAA+NON-PROBE: NOT DETECTED
E COLI DNA BLD POS QL NAA+NON-PROBE: NOT DETECTED
E FAECALIS DNA BLD POS QL NAA+PROBE: NOT DETECTED
E FAECIUM DNA BLD POS QL NAA+PROBE: NOT DETECTED
EKG ATRIAL RATE: 91 BPM
EKG P AXIS: 31 DEGREES
EKG P-R INTERVAL: 196 MS
EKG Q-T INTERVAL: 378 MS
EKG QRS DURATION: 86 MS
EKG QTC CALCULATION (BAZETT): 464 MS
EKG R AXIS: 12 DEGREES
EKG T AXIS: 13 DEGREES
EKG VENTRICULAR RATE: 91 BPM
ENTEROBACT DNA BLD POS QL NAA+NON-PROBE: NOT DETECTED
ENTEROCOC DNA BLD POS QL NAA+NON-PROBE: NOT DETECTED
EOSINOPHIL # BLD: 0.1 K/UL (ref 0–0.7)
EOSINOPHIL NFR BLD: 0.4 %
ERYTHROCYTE [DISTWIDTH] IN BLOOD BY AUTOMATED COUNT: 13.7 % (ref 11.5–14.5)
GLOBULIN SER CALC-MCNC: 2.9 G/DL (ref 2.3–3.5)
GLOBULIN SER CALC-MCNC: 3.4 G/DL (ref 2.3–3.5)
GLUCOSE SERPL-MCNC: 112 MG/DL (ref 70–99)
GLUCOSE SERPL-MCNC: 140 MG/DL (ref 70–99)
GN BLD CULTURE PNL BLD POS NAA+PROBE: NOT DETECTED
GP B STREP DNA BLD POS QL NAA+NON-PROBE: NOT DETECTED
HCT VFR BLD AUTO: 44.6 % (ref 37–47)
HGB BLD-MCNC: 14.8 G/DL (ref 12–16)
K OXYTOCA DNA BLD POS QL NAA+NON-PROBE: NOT DETECTED
K PNEUMON DNA SPEC QL NAA+PROBE: NOT DETECTED
K. AEROGENES DNA SPEC QL NAA+PROBE: NOT DETECTED
L MONOCYTOG DNA BLD POS QL NAA+NON-PROBE: NOT DETECTED
LACTIC ACID, SEPSIS: 0.9 MMOL/L (ref 0.5–1.9)
LACTIC ACID, SEPSIS: 1.2 MMOL/L (ref 0.5–1.9)
LACTIC ACID, SEPSIS: 1.9 MMOL/L (ref 0.5–1.9)
LYMPHOCYTES # BLD: 1.2 K/UL (ref 1–4.8)
LYMPHOCYTES NFR BLD: 7.4 %
MCH RBC QN AUTO: 31.5 PG (ref 27–31.3)
MCHC RBC AUTO-ENTMCNC: 33.2 % (ref 33–37)
MCV RBC AUTO: 94.9 FL (ref 79.4–94.8)
MONOCYTES # BLD: 1.2 K/UL (ref 0.2–0.8)
MONOCYTES NFR BLD: 7.4 %
N MEN DNA BLD POS QL NAA+NON-PROBE: NOT DETECTED
NEUTROPHILS # BLD: 13.8 K/UL (ref 1.4–6.5)
NEUTS SEG NFR BLD: 83.8 %
P AERUGINOSA DNA BLD POS NAA+NON-PROBE: NOT DETECTED
PLATELET # BLD AUTO: 255 K/UL (ref 130–400)
POTASSIUM SERPL-SCNC: 3.6 MEQ/L (ref 3.4–4.9)
POTASSIUM SERPL-SCNC: 4.2 MEQ/L (ref 3.4–4.9)
PROCALCITONIN SERPL IA-MCNC: 0.15 NG/ML (ref 0–0.15)
PROT SERPL-MCNC: 6.3 G/DL (ref 6.3–8)
PROT SERPL-MCNC: 7.2 G/DL (ref 6.3–8)
PROTEUS SP DNA BLD POS QL NAA+NON-PROBE: NOT DETECTED
RBC # BLD AUTO: 4.7 M/UL (ref 4.2–5.4)
S AUREUS DNA BLD POS QL NAA+NON-PROBE: NOT DETECTED
S AUREUS+CONS DNA BLD POS NAA+NON-PROBE: NOT DETECTED
S EPIDERMIDIS DNA BLD POS QL NAA+PROBE: NOT DETECTED
S LUGDUNENSIS DNA BLD POS QL NAA+PROBE: NOT DETECTED
S MALTOPH DNA BLD POS QL NAA+PROBE: NOT DETECTED
S MARCESCENS DNA BLD POS NAA+NON-PROBE: NOT DETECTED
S PNEUM DNA BLD POS QL NAA+NON-PROBE: NOT DETECTED
S PYO DNA BLD POS QL NAA+NON-PROBE: NOT DETECTED
SALMONELLA DNA BLD POS QL NAA+PROBE: NOT DETECTED
SODIUM SERPL-SCNC: 139 MEQ/L (ref 135–144)
SODIUM SERPL-SCNC: 141 MEQ/L (ref 135–144)
STREPTOCOCCUS DNA BLD POS NAA+NON-PROBE: NOT DETECTED
WBC # BLD AUTO: 16.5 K/UL (ref 4.8–10.8)

## 2024-07-22 PROCEDURE — 87077 CULTURE AEROBIC IDENTIFY: CPT

## 2024-07-22 PROCEDURE — 87070 CULTURE OTHR SPECIMN AEROBIC: CPT

## 2024-07-22 PROCEDURE — 6360000002 HC RX W HCPCS

## 2024-07-22 PROCEDURE — 6370000000 HC RX 637 (ALT 250 FOR IP): Performed by: INTERNAL MEDICINE

## 2024-07-22 PROCEDURE — 86403 PARTICLE AGGLUT ANTBDY SCRN: CPT

## 2024-07-22 PROCEDURE — 93005 ELECTROCARDIOGRAM TRACING: CPT

## 2024-07-22 PROCEDURE — 87040 BLOOD CULTURE FOR BACTERIA: CPT

## 2024-07-22 PROCEDURE — 84145 PROCALCITONIN (PCT): CPT

## 2024-07-22 PROCEDURE — 87150 DNA/RNA AMPLIFIED PROBE: CPT

## 2024-07-22 PROCEDURE — 99212 OFFICE O/P EST SF 10 MIN: CPT

## 2024-07-22 PROCEDURE — 83605 ASSAY OF LACTIC ACID: CPT

## 2024-07-22 PROCEDURE — 36415 COLL VENOUS BLD VENIPUNCTURE: CPT

## 2024-07-22 PROCEDURE — 2580000003 HC RX 258: Performed by: INTERNAL MEDICINE

## 2024-07-22 PROCEDURE — 2580000003 HC RX 258

## 2024-07-22 PROCEDURE — 1210000000 HC MED SURG R&B

## 2024-07-22 PROCEDURE — 87186 SC STD MICRODIL/AGAR DIL: CPT

## 2024-07-22 PROCEDURE — 80053 COMPREHEN METABOLIC PANEL: CPT

## 2024-07-22 PROCEDURE — 99222 1ST HOSP IP/OBS MODERATE 55: CPT | Performed by: INTERNAL MEDICINE

## 2024-07-22 PROCEDURE — 6370000000 HC RX 637 (ALT 250 FOR IP)

## 2024-07-22 PROCEDURE — 6360000002 HC RX W HCPCS: Performed by: INTERNAL MEDICINE

## 2024-07-22 RX ORDER — ASPIRIN 81 MG/1
81 TABLET ORAL DAILY
Status: DISCONTINUED | OUTPATIENT
Start: 2024-07-22 | End: 2024-07-25 | Stop reason: HOSPADM

## 2024-07-22 RX ORDER — POLYETHYLENE GLYCOL 3350 17 G/17G
17 POWDER, FOR SOLUTION ORAL DAILY PRN
Status: DISCONTINUED | OUTPATIENT
Start: 2024-07-22 | End: 2024-07-25 | Stop reason: HOSPADM

## 2024-07-22 RX ORDER — SODIUM CHLORIDE 0.9 % (FLUSH) 0.9 %
5-40 SYRINGE (ML) INJECTION EVERY 12 HOURS SCHEDULED
Status: DISCONTINUED | OUTPATIENT
Start: 2024-07-22 | End: 2024-07-25 | Stop reason: HOSPADM

## 2024-07-22 RX ORDER — GABAPENTIN 100 MG/1
100 CAPSULE ORAL DAILY
Status: DISCONTINUED | OUTPATIENT
Start: 2024-07-22 | End: 2024-07-25 | Stop reason: HOSPADM

## 2024-07-22 RX ORDER — ONDANSETRON 4 MG/1
4 TABLET, ORALLY DISINTEGRATING ORAL EVERY 8 HOURS PRN
Status: DISCONTINUED | OUTPATIENT
Start: 2024-07-22 | End: 2024-07-25 | Stop reason: HOSPADM

## 2024-07-22 RX ORDER — SODIUM CHLORIDE 0.9 % (FLUSH) 0.9 %
5-40 SYRINGE (ML) INJECTION PRN
Status: DISCONTINUED | OUTPATIENT
Start: 2024-07-22 | End: 2024-07-25 | Stop reason: HOSPADM

## 2024-07-22 RX ORDER — POTASSIUM CHLORIDE 7.45 MG/ML
10 INJECTION INTRAVENOUS PRN
Status: DISCONTINUED | OUTPATIENT
Start: 2024-07-22 | End: 2024-07-25 | Stop reason: HOSPADM

## 2024-07-22 RX ORDER — ACETAMINOPHEN 325 MG/1
650 TABLET ORAL EVERY 6 HOURS PRN
Status: DISCONTINUED | OUTPATIENT
Start: 2024-07-22 | End: 2024-07-25 | Stop reason: HOSPADM

## 2024-07-22 RX ORDER — METRONIDAZOLE 500 MG/100ML
500 INJECTION, SOLUTION INTRAVENOUS EVERY 8 HOURS
Status: DISCONTINUED | OUTPATIENT
Start: 2024-07-22 | End: 2024-07-23

## 2024-07-22 RX ORDER — ACETAMINOPHEN 650 MG/1
650 SUPPOSITORY RECTAL EVERY 6 HOURS PRN
Status: DISCONTINUED | OUTPATIENT
Start: 2024-07-22 | End: 2024-07-25 | Stop reason: HOSPADM

## 2024-07-22 RX ORDER — AMIODARONE HYDROCHLORIDE 200 MG/1
200 TABLET ORAL DAILY
Status: DISCONTINUED | OUTPATIENT
Start: 2024-07-22 | End: 2024-07-25 | Stop reason: HOSPADM

## 2024-07-22 RX ORDER — ONDANSETRON 2 MG/ML
4 INJECTION INTRAMUSCULAR; INTRAVENOUS EVERY 6 HOURS PRN
Status: DISCONTINUED | OUTPATIENT
Start: 2024-07-22 | End: 2024-07-25 | Stop reason: HOSPADM

## 2024-07-22 RX ORDER — SODIUM CHLORIDE 9 MG/ML
INJECTION, SOLUTION INTRAVENOUS PRN
Status: DISCONTINUED | OUTPATIENT
Start: 2024-07-22 | End: 2024-07-25 | Stop reason: HOSPADM

## 2024-07-22 RX ORDER — POTASSIUM CHLORIDE 20 MEQ/1
40 TABLET, EXTENDED RELEASE ORAL PRN
Status: DISCONTINUED | OUTPATIENT
Start: 2024-07-22 | End: 2024-07-25 | Stop reason: HOSPADM

## 2024-07-22 RX ORDER — ATORVASTATIN CALCIUM 40 MG/1
40 TABLET, FILM COATED ORAL DAILY
Status: DISCONTINUED | OUTPATIENT
Start: 2024-07-22 | End: 2024-07-25 | Stop reason: HOSPADM

## 2024-07-22 RX ORDER — LOSARTAN POTASSIUM 25 MG/1
25 TABLET ORAL DAILY
Status: DISCONTINUED | OUTPATIENT
Start: 2024-07-22 | End: 2024-07-25 | Stop reason: HOSPADM

## 2024-07-22 RX ORDER — MAGNESIUM SULFATE IN WATER 40 MG/ML
2000 INJECTION, SOLUTION INTRAVENOUS PRN
Status: DISCONTINUED | OUTPATIENT
Start: 2024-07-22 | End: 2024-07-25 | Stop reason: HOSPADM

## 2024-07-22 RX ORDER — 0.9 % SODIUM CHLORIDE 0.9 %
710 INTRAVENOUS SOLUTION INTRAVENOUS ONCE
Status: COMPLETED | OUTPATIENT
Start: 2024-07-22 | End: 2024-07-22

## 2024-07-22 RX ORDER — METOPROLOL SUCCINATE 100 MG/1
100 TABLET, EXTENDED RELEASE ORAL 2 TIMES DAILY
Status: DISCONTINUED | OUTPATIENT
Start: 2024-07-22 | End: 2024-07-25 | Stop reason: HOSPADM

## 2024-07-22 RX ADMIN — APIXABAN 5 MG: 5 TABLET, FILM COATED ORAL at 20:37

## 2024-07-22 RX ADMIN — METOPROLOL SUCCINATE 100 MG: 100 TABLET, EXTENDED RELEASE ORAL at 20:37

## 2024-07-22 RX ADMIN — GABAPENTIN 100 MG: 100 CAPSULE ORAL at 20:37

## 2024-07-22 RX ADMIN — MUPIROCIN: 20 OINTMENT TOPICAL at 20:50

## 2024-07-22 RX ADMIN — LEVOTHYROXINE SODIUM 175 MCG: 0.12 TABLET ORAL at 06:03

## 2024-07-22 RX ADMIN — SODIUM CHLORIDE 1000 ML: 9 INJECTION, SOLUTION INTRAVENOUS at 00:37

## 2024-07-22 RX ADMIN — METOPROLOL SUCCINATE 100 MG: 100 TABLET, EXTENDED RELEASE ORAL at 08:28

## 2024-07-22 RX ADMIN — LOSARTAN POTASSIUM 25 MG: 25 TABLET, FILM COATED ORAL at 08:28

## 2024-07-22 RX ADMIN — APIXABAN 5 MG: 5 TABLET, FILM COATED ORAL at 08:28

## 2024-07-22 RX ADMIN — METRONIDAZOLE 500 MG: 500 INJECTION, SOLUTION INTRAVENOUS at 08:35

## 2024-07-22 RX ADMIN — METRONIDAZOLE 500 MG: 500 INJECTION, SOLUTION INTRAVENOUS at 17:34

## 2024-07-22 RX ADMIN — Medication 5 ML: at 08:28

## 2024-07-22 RX ADMIN — AMIODARONE HYDROCHLORIDE 200 MG: 200 TABLET ORAL at 08:28

## 2024-07-22 RX ADMIN — MUPIROCIN: 20 OINTMENT TOPICAL at 11:43

## 2024-07-22 RX ADMIN — SODIUM CHLORIDE 710 ML: 9 INJECTION, SOLUTION INTRAVENOUS at 03:18

## 2024-07-22 RX ADMIN — CEFTRIAXONE SODIUM 1000 MG: 1 INJECTION, POWDER, FOR SOLUTION INTRAMUSCULAR; INTRAVENOUS at 01:00

## 2024-07-22 RX ADMIN — ATORVASTATIN CALCIUM 40 MG: 40 TABLET, FILM COATED ORAL at 08:28

## 2024-07-22 RX ADMIN — ASPIRIN 81 MG: 81 TABLET, COATED ORAL at 08:27

## 2024-07-22 RX ADMIN — ACETAMINOPHEN 650 MG: 325 TABLET ORAL at 04:47

## 2024-07-22 RX ADMIN — METRONIDAZOLE 500 MG: 500 INJECTION, SOLUTION INTRAVENOUS at 00:41

## 2024-07-22 ASSESSMENT — ENCOUNTER SYMPTOMS
GASTROINTESTINAL NEGATIVE: 1
COLOR CHANGE: 1
RESPIRATORY NEGATIVE: 1

## 2024-07-22 ASSESSMENT — PAIN SCALES - GENERAL: PAINLEVEL_OUTOF10: 5

## 2024-07-22 NOTE — PROGRESS NOTES
Physician Progress Note      PATIENT:               JUAN JAMES  CSN #:                  [id number]  :                       1965  ADMIT DATE:       2024 11:41 PM  DISCH DATE:  RESPONDING  PROVIDER #:        Juanjo Preston DO          QUERY TEXT:    Patient admitted with infected cat bite, noted to have h/o atrial fibrillation   and is maintained on Eliquis. Hx of \"chronic saddle PE\" per H&P. If possible,   please document in progress notes and discharge summary if you are evaluating   and/or treating any of the following:?  ?  The medical record reflects the following:  Risk Factors: 58 yo female with h/o HTN and pulmonary embolism  Clinical Indicators: H&P \"Atrial fibrillation on Eliquis with Hx \"chronic   saddle PE\"\".  Treatment: Eliquis    Thank you, Kathia Joseph RN BSN CDS  [phone]  Options provided:  -- Secondary hypercoagulable state in a patient with atrial fibrillation  -- Other - I will add my own diagnosis  -- Disagree - Not applicable / Not valid  -- Disagree - Clinically unable to determine / Unknown  -- Refer to Clinical Documentation Reviewer    PROVIDER RESPONSE TEXT:    This patient has secondary hypercoagulable state in a patient with atrial   fibrillation.    Query created by: Guerita Joseph on 2024 10:51 AM      Electronically signed by:  Juanjo Preston DO 2024 10:53 AM

## 2024-07-22 NOTE — PLAN OF CARE
Problem: Discharge Planning  Goal: Discharge to home or other facility with appropriate resources  Outcome: Progressing  Flowsheets (Taken 7/22/2024 0304)  Discharge to home or other facility with appropriate resources:   Identify barriers to discharge with patient and caregiver   Identify discharge learning needs (meds, wound care, etc)   Arrange for needed discharge resources and transportation as appropriate   Refer to discharge planning if patient needs post-hospital services based on physician order or complex needs related to functional status, cognitive ability or social support system

## 2024-07-22 NOTE — CONSULTS
Spiritual Care Services     Summary of Visit:   visited patient for an advanced directive consult.  gave patient an advanced directive packet. Patient expressed understanding of the purpose of advanced directives.  explained the process of completing the documents as an inpatient and as an outpatient.    Encounter Summary  Encounter Overview/Reason: Advance Care Planning  Service Provided For: Patient  Referral/Consult From: Nurse  Support System: Children  Complexity of Encounter: Moderate  Begin Time: 1525  End Time : 1555  Total Time Calculated: 30 min                          Advance Care Planning  Type: ACP conversation    Spiritual Assessment/Intervention/Outcomes:                     Care Plan:         Assist with advanced directives as requested      Spiritual Care Services   Electronically signed by Chaplain Abdulaziz on 7/22/2024 at 3:59 PM.    To reach a  for emotional and spiritual support, place an EPIC consult request.   If a  is needed immediately, dial “0” and ask to page the on-call .

## 2024-07-22 NOTE — CARE COORDINATION
Case Management Assessment  Initial Evaluation    Date/Time of Evaluation: 7/22/2024 10:56 AM  Assessment Completed by: Yesenia Jackson RN    If patient is discharged prior to next notation, then this note serves as note for discharge by case management.    Patient Name: Manolo Poe                   YOB: 1965  Diagnosis: Cellulitis of left lower extremity without foot [L03.116]  Infected cat bite of lower leg, initial encounter [S81.859A, L08.9, W55.01XA]  Sepsis, due to unspecified organism, unspecified whether acute organ dysfunction present (HCC) [A41.9]                   Date / Time: 7/21/2024 11:41 PM    Patient Admission Status: Inpatient   Readmission Risk (Low < 19, Mod (19-27), High > 27): Readmission Risk Score: 12.8    Current PCP: Michel Justice MD  PCP verified by CM? Yes    Chart Reviewed: Yes      History Provided by: Patient  Patient Orientation: Alert and Oriented    Patient Cognition: Alert    Hospitalization in the last 30 days (Readmission):  No    If yes, Readmission Assessment in CM Navigator will be completed.    Advance Directives:      Code Status: Full Code   Patient's Primary Decision Maker is: Named in Scanned ACP Document      Discharge Planning:    Patient lives with: Spouse/Significant Other Type of Home: House  Primary Care Giver: Self  Patient Support Systems include: Spouse/Significant Other   Current Financial resources: Other (Comment) (COMMERCIAL)  Current community resources: None  Current services prior to admission: C-pap            Current DME:  NONE             Type of Home Care services:  None    ADLS  Prior functional level: Independent in ADLs/IADLs  Current functional level: Independent in ADLs/IADLs    PT AM-PAC:   /24  OT AM-PAC:   /24    Family can provide assistance at DC: Yes  Would you like Case Management to discuss the discharge plan with any other family members/significant others, and if so, who? Yes  Plans to Return to Present Housing:  Yes  Other Identified Issues/Barriers to RETURNING to current housing: MEDICAL COMPLICATION, CAT BITE, IV ABX, ID CONSULT   Potential Assistance needed at discharge: N/A            Potential DME:  N/A  Patient expects to discharge to: House  Plan for transportation at discharge: Self    Financial    Payor: [employer] / Plan: [employer] [place] / Product Type: *No Product type* /     Does insurance require precert for SNF: Yes    Potential assistance Purchasing Medications: No  Meds-to-Beds request: Yes      [facility] - [place], [place] - [address] [facility] - P [phone] - F [phone]  [address] [facility] [address]  Phone: [phone] Fax: [phone]      Notes:    Factors facilitating achievement of predicted outcomes: Family support, Motivated, Cooperative, Pleasant, Sense of humor, and Good insight into deficits    Barriers to discharge: Pain, Medical complications, Stairs at home, Wound Care, and Medication managment    Additional Case Management Notes: MET W/PT TO ASSESS NEEDS AND DISCUSS DISCHARGE PLAN WHICH IS HOME W/SPOUSE. DENIES NEEDS. INDEPENDENT OF ADLS, IADLS, HAS CPAP. NO DME, O2 OR HD, NOT A . WILL FOLLOW ID PLAN.     The Plan for Transition of Care is related to the following treatment goals of Cellulitis of left lower extremity without foot [L03.116]  Infected cat bite of lower leg, initial encounter [S81.859A, L08.9, W55.01XA]  Sepsis, due to unspecified organism, unspecified whether acute organ dysfunction present (HCC) [A41.9]    IF APPLICABLE: The Patient and/or patient representative Manolo and her family were provided with a choice of provider and agrees with the discharge plan. Freedom of choice list with basic dialogue that supports the patient's individualized plan of care/goals and shares the quality data associated with the providers was provided to: Patient   Patient Representative Name:       The Patient and/or Patient Representative Agree with the

## 2024-07-22 NOTE — PROGRESS NOTES
Wound Ostomy Continence Nurse  Consult Note       NAME:  Manolo Poe  MEDICAL RECORD NUMBER:  [id number]  AGE: 59 y.o.   GENDER: female  : 1965  TODAY'S DATE:  2024    Subjective   Reason for WOC Nurse Evaluation and Assessment: LLE \"cat bite\"       Manolo Poe is a 59 y.o. female referred by:   [x] Physician  [] Nursing  [] Other:     Wound Identification:  Wound Type: traumatic  Contributing Factors:  cat bite    Wound History: Patient admitted to [facility] on 2024 with wound to left lower extremity. Patient reports she got bit by her cat about a week ago and she has had worsening redness and discomfort to the extremity so she came in.   Current Wound Care Treatment:  Recommending 1) mupirocin ointment BID    Patient Goal of Care:  [x] Wound Healing  [] Odor Control  [] Palliative Care  [] Pain Control   [] Other:         PAST MEDICAL HISTORY        Diagnosis Date    Atrial fibrillation (HCC)     Hypertension     PE (pulmonary thromboembolism) (HCC) 2020    Ventricular tachycardia (HCC)        PAST SURGICAL HISTORY    Past Surgical History:   Procedure Laterality Date    APPENDECTOMY      CARDIOVERSION  2021    CHOLECYSTECTOMY      COLONOSCOPY N/A 2021    COLONOSCOPY DIAGNOSTIC WITH POLYPECTOMY performed by Sergo Olivier MD at [facility]    GALLBLADDER SURGERY      THYROIDECTOMY      TONSILLECTOMY AND ADENOIDECTOMY      TUBAL LIGATION         FAMILY HISTORY    Family History   Problem Relation Age of Onset    Stroke Mother         CVA    Diabetes Mother     Heart Disease Father     Heart Failure Sister     Colon Cancer Neg Hx     Breast Cancer Neg Hx        SOCIAL HISTORY    Social History     Tobacco Use    Smoking status: Former     Current packs/day: 0.00     Average packs/day: 1.5 packs/day for 35.0 years (52.5 ttl pk-yrs)     Types: Cigarettes     Start date:      Quit date:      Years since quitting: 10.5     Passive exposure: Past     Smokeless tobacco: Never   Vaping Use    Vaping Use: Never used   Substance Use Topics    Alcohol use: Not Currently    Drug use: Never       ALLERGIES    Allergies   Allergen Reactions    Amoxicillin Rash    Lisinopril Other (See Comments)     COUGHING       MEDICATIONS    No current facility-administered medications on file prior to encounter.     Current Outpatient Medications on File Prior to Encounter   Medication Sig Dispense Refill    gabapentin (NEURONTIN) 100 MG capsule Take 1 capsule by mouth daily for 30 days. 30 capsule 3    apixaban (ELIQUIS) 5 MG TABS tablet Take 1 tablet by mouth 2 times daily 180 tablet 3    Semaglutide, 1 MG/DOSE, (OZEMPIC, 1 MG/DOSE,) 4 MG/3ML SOPN sc injection 1 mg once a week 3 mL 4    metFORMIN (GLUCOPHAGE) 500 MG tablet 1 tablet with a meal Orally Twice a day 60 tablet 3    levothyroxine (SYNTHROID) 175 MCG tablet 1 po daily 90 tablet 3    metoprolol succinate (TOPROL XL) 100 MG extended release tablet Take 1 tablet by mouth 2 times daily 180 tablet 3    losartan (COZAAR) 25 MG tablet Take 1 tablet by mouth daily      atorvastatin (LIPITOR) 40 MG tablet TAKE 1 TABLET BY MOUTH EVERY DAY 90 tablet 3    amiodarone (CORDARONE) 200 MG tablet Take 1 tablet by mouth daily 90 tablet 3    vitamin B-6 (PYRIDOXINE) 100 MG tablet daily      CPAP Machine MISC by Does not apply route New CPAP with 10 cm of H2O 1 each 0    aspirin 81 MG EC tablet Take 1 tablet by mouth daily      Cyanocobalamin (B-12) 1000 MCG SUBL Place under the tongue      folic acid (FOLVITE) 800 MCG tablet Take 1 tablet by mouth daily         Objective    /80   Pulse 88   Temp 98.4 °F (36.9 °C)   Resp 18   Ht 1.676 m (5' 6\")   Wt (!) 136.5 kg (301 lb)   LMP  (LMP Unknown)   SpO2 96%   BMI 48.58 kg/m²     LABS:  WBC:    Lab Results   Component Value Date/Time    WBC 16.5 07/21/2024 10:45 PM     H/H:    Lab Results   Component Value Date/Time    HGB 14.8 07/21/2024 10:45 PM    HCT 44.6 07/21/2024 10:45 PM

## 2024-07-22 NOTE — ED PROVIDER NOTES
[facility]  EMERGENCY DEPARTMENT ENCOUNTER      Pt Name: Manolo Poe  MRN: [id number]  Birthdate 1965  Date of evaluation: 7/21/2024  Provider: GABINO Styles  11:42 PM EDT      CHIEF COMPLAINT       Chief Complaint   Patient presents with    Wound Check         HISTORY OF PRESENT ILLNESS   (Location/Symptom, Timing/Onset, Context/Setting, Quality, Duration, Modifying Factors, Severity)  Note limiting factors.   Manolo Poe is a 59 y.o. female who presents to the emergency department with PMHx PAF, tachycardia, hypertension, ALEYDA, papillary thyroid carcinoma, postoperative hypothyroidism, morbid obesity, anticoagulation on Eliquis.  Patient presents to the ED for evaluation of left lower extremity swelling, erythema, wound.  Patient states that she was bit by a cat last Wednesday 5 days ago.  States she has been cleansing the wound and it was looking well.  It was intermittently bleeding no purulent discharge noted though.  Patient states history of sepsis from cellulitis.  Patient states last time she had sepsis with cellulitis she had diffuse nausea chills and tachycardia with her A-fib.  Patient began to feel nausea and chills this evening and noted that she had progressively spreading erythema and swelling to her left lower extremity surrounding her cat bite.  Patient was not evaluated at the time of the cat bite and she has not been on antibiotics for this.  Patient denies noting any fevers.    HPI    Nursing Notes were reviewed.    REVIEW OF SYSTEMS    (2-9 systems for level 4, 10 or more for level 5)     Review of Systems   Constitutional:  Positive for chills. Negative for fever.   HENT:  Negative for congestion.    Eyes:  Negative for photophobia.   Respiratory:  Negative for cough and shortness of breath.    Cardiovascular:  Negative for chest pain.   Gastrointestinal:  Positive for nausea. Negative for abdominal pain, diarrhea and vomiting.   Genitourinary:  Negative for    Shell Brown PA  07/22/24 0057

## 2024-07-22 NOTE — ACP (ADVANCE CARE PLANNING)
Advance Care Planning   Healthcare Decision Maker:    Primary Decision Maker: Calli Poe - Child - [phone]  SECONDARY DECISION MAKER: DTR VENKAT POE, AND SPOUSE- [phone]

## 2024-07-22 NOTE — CARE COORDINATION
IV BENEFIT REQUEST FORM    FAX FROM: [place] [phone]    REQUESTED BY: Electronically signed by Yesenia Jackson RN on 2024 at 8:27 AM                                               RN/C3: PHONE: [phone])     DATE:/TIME OF REQUEST: 24  TIME: 8:27 AM      TO: [facility]      FAX TO: [phone]    PHONE: [phone]     THIS PATIENT HAS BEEN IDENTIFIED TO POSSIBLY NEED LONG TERM IV'S.    PLEASE CHECK INSURANCE COVERAGE FOR THE FOLLOWING PT/DRUGS.    PATIENT'S NAME: JUAN JAMES                              ROOM: [place] [address]   PATIENT'S : 1965  PATIENT ADDRESS: [address] [place] [address]  SSN:    ([id number])     PAYOR NAME:  Payor: [employer] / Plan: [employer] / Product Type: *No Product type* /     DRUG: (ROCEPHIN)                         DOSE: (1,000MG)            FREQUENCY: Q (24) HR    DRUG: (FLAGYL)                         DOSE: (500MG)            FREQUENCY: Q (8) HR    __________ CHECK HERE IF PT HAS NO INSURANCE AND REQUESTING SELF PAY COST.    *IF [facility] IS NOT A PROVIDER FOR THIS PATIENT, PLEASE FORWARD INFO VIA FAX TO [facility] @ [phone],(PHONE NUMBER: [phone]) TO RUN BENEFIT VERIFICATION AND NOTIFY THE ABOVE C3 OF THIS PLAN.    (FAX FACE SHEET WITH DEMOGRAPHICS AND INSURANCE INFO WITH THIS FORM.)  PLEASE FAX BENEFIT INFO TO: THE [facility] 4 WT  [phone]    This message is intended only for the use of the individual or entity to which it is addressed and may contain information that is privileged, confidential, and exempt from disclosure under applicable law. If the reader of the notice is not intended recipient of the employee/agent responsible for delivering the message to the intended recipient, you are hereby notified than any dissemination, distribution or copying of this communication is  strictly prohibited. Please contact the sender for further instructions on handling the information.

## 2024-07-22 NOTE — PLAN OF CARE
Problem: Discharge Planning  Goal: Discharge to home or other facility with appropriate resources  7/22/2024 1221 by Kelsy Jc, RN  Outcome: Progressing  7/22/2024 1221 by Kelsy Jc, RN  Outcome: Progressing  7/22/2024 0632 by Dayanara Estrada, RN  Outcome: Progressing  Flowsheets (Taken 7/22/2024 0304)  Discharge to home or other facility with appropriate resources:   Identify barriers to discharge with patient and caregiver   Identify discharge learning needs (meds, wound care, etc)   Arrange for needed discharge resources and transportation as appropriate   Refer to discharge planning if patient needs post-hospital services based on physician order or complex needs related to functional status, cognitive ability or social support system     Problem: Skin/Tissue Integrity  Goal: Absence of new skin breakdown  Description: 1.  Monitor for areas of redness and/or skin breakdown  2.  Assess vascular access sites hourly  3.  Every 4-6 hours minimum:  Change oxygen saturation probe site  4.  Every 4-6 hours:  If on nasal continuous positive airway pressure, respiratory therapy assess nares and determine need for appliance change or resting period.  Outcome: Progressing

## 2024-07-22 NOTE — ED TRIAGE NOTES
Started with cat bite on left leg now lower leg is red and swollen has a wound, positive N/V/D fevers and chills at home. Hx sepsis with ICU admission

## 2024-07-22 NOTE — H&P
DEPARTMENT OF HOSPITAL MEDICINE    HISTORY AND PHYSICAL EXAM    PATIENT NAME:  Manolo Poe    MRN:  [id number]  SERVICE DATE:  7/22/2024   SERVICE TIME:  1:31 AM    Primary Care Physician: Michel Justice MD     SUBJECTIVE  CHIEF COMPLAINT: Suspected infection of cat bite    HPI:  Manolo Poe is a 59 y.o. female who presents with above complaints.    Patient is a 59-year-old female presenting to the ED with suspicion of infection developing at site of cat bite to her left lower leg 5 days ago.  Symptoms worsening over the past day.  No antibiotics as outpatient so far.  Associated nausea/vomiting/diarrhea and chills at home.  Patient was very concerned as she has had a prior admission to the ICU with sepsis from cellulitis.  She has a history of penicillin allergy (hives from amoxicillin).  Mild temperature elevation not reading fever threshold of 99.7 °F in ED, with maximum heart rate 102, maximum respiratory rate 20, no hypotension, no hypoxia, and no evidence of gross organ dysfunction on laboratory analysis.  WBCs elevated to 16.5, but procalcitonin and lactate levels non-elevated.  Creatinine 1.34, within patient's personal baseline.  Urine sample collected for urinalysis, but gross skin contamination and cannot be used for diagnostics.    ED considered patient to meet basic sepsis criteria under sepsis-1 measures, but patient does not meet sepsis criteria under current best practice sepsis-3 criteria (qSOFA score = 0).  In setting of reported penicillin allergy, patient was administered ceftriaxone and metronidazole in the ED, and hospitalist service was consulted for admission.  Patient is a full code.    On examination, patient is attempting to sleep in bedside recliner.  She confirms that the cat is 1 that she takes care of her and has been vaccinated against rabies.  States that she had no illness symptoms until approximately 6 PM on 7/21, at which time they progressed fairly rapidly.  She was very  coughing during exam.  Abdomen: Morbidly obese, non-tense.  Bowel sounds appreciated.   Neurologic: Alert, grossly oriented.  Nonfocal.  Psychiatric: Pleasant and cooperative.  Mildly anxious.  MSK/Integumentary: No gross bony abnormalities.  Small left anterior shin lesion, appears relatively superficial, with no obvious visual evidence of deep puncture type wound.  Mild localized erythema, but no focal purulence:            DATA:     Diagnostic tests reviewed for today's visit:    Most recent labs and imaging results reviewed.     LABS:      Recent Results (from the past 24 hour(s))   CBC with Auto Differential    Collection Time: 07/21/24 10:45 PM   Result Value Ref Range    WBC 16.5 (H) 4.8 - 10.8 K/uL    RBC 4.70 4.20 - 5.40 M/uL    Hemoglobin 14.8 12.0 - 16.0 g/dL    Hematocrit 44.6 37.0 - 47.0 %    MCV 94.9 (H) 79.4 - 94.8 fL    MCH 31.5 (H) 27.0 - 31.3 pg    MCHC 33.2 33.0 - 37.0 %    RDW 13.7 11.5 - 14.5 %    Platelets 255 130 - 400 K/uL    Neutrophils % 83.8 %    Lymphocytes % 7.4 %    Monocytes % 7.4 %    Eosinophils % 0.4 %    Basophils % 0.5 %    Neutrophils Absolute 13.8 (H) 1.4 - 6.5 K/uL    Lymphocytes Absolute 1.2 1.0 - 4.8 K/uL    Monocytes Absolute 1.2 (H) 0.2 - 0.8 K/uL    Eosinophils Absolute 0.1 0.0 - 0.7 K/uL    Basophils Absolute 0.1 0.0 - 0.2 K/uL   Comprehensive Metabolic Panel    Collection Time: 07/21/24 10:45 PM   Result Value Ref Range    Sodium 139 135 - 144 mEq/L    Potassium 3.6 3.4 - 4.9 mEq/L    Chloride 103 95 - 107 mEq/L    CO2 26 20 - 31 mEq/L    Anion Gap 10 9 - 15 mEq/L    Glucose 140 (H) 70 - 99 mg/dL    BUN 25 (H) 6 - 20 mg/dL    Creatinine 1.34 (H) 0.50 - 0.90 mg/dL    Est, Glom Filt Rate 45.9 (L) >60    Calcium 9.5 8.5 - 9.9 mg/dL    Total Protein 7.2 6.3 - 8.0 g/dL    Albumin 3.8 3.5 - 4.6 g/dL    Total Bilirubin 0.6 0.2 - 0.7 mg/dL    Alkaline Phosphatase 153 (H) 40 - 130 U/L    ALT 34 (H) 0 - 33 U/L    AST 29 0 - 35 U/L    Globulin 3.4 2.3 - 3.5 g/dL   Lactate, Sepsis

## 2024-07-22 NOTE — PROGRESS NOTES
1330 Bactroban applied to scab on left lower leg.  Nurse outlined red on leg. Noted after marking that red was up more on the leg. Gil new line showing the redness. No complaints of pain or discomfort. No further needs. Call light in reach.

## 2024-07-22 NOTE — CONSULTS
Infectious Disease     Patient Name: Manolo Poe  Date: 7/22/2024  YOB: 1965  Medical Record Number: [id number]              History of Present Illness:   prior admission to the ICU with sepsis from cellulitis.   Sleep apnea Atrial fibrillation history of pulmonary emboli ventricular tachycardia    cat bite to her left lower leg 5 days pta   Rapidly progressive swelling pain site of bite left leg  No antibiotics   fever   no hypotension, no hypoxia,           Review of Systems   Constitutional:  Positive for chills, diaphoresis, fatigue and fever.   HENT: Negative.     Respiratory: Negative.     Cardiovascular:  Positive for leg swelling.   Gastrointestinal: Negative.    Endocrine: Negative.    Genitourinary: Negative.    Musculoskeletal: Negative.    Skin:  Positive for color change and wound.       Review of Systems: All 14 review of systems negative other than as stated above    Social History     Tobacco Use    Smoking status: Former     Current packs/day: 0.00     Average packs/day: 1.5 packs/day for 35.0 years (52.5 ttl pk-yrs)     Types: Cigarettes     Start date: 1984     Quit date: 2014     Years since quitting: 10.5     Passive exposure: Past    Smokeless tobacco: Never   Vaping Use    Vaping Use: Never used   Substance Use Topics    Alcohol use: Not Currently    Drug use: Never         Past Medical History:   Diagnosis Date    Atrial fibrillation (HCC)     Hypertension     PE (pulmonary thromboembolism) (HCC) 11/2020    Ventricular tachycardia (HCC)            Past Surgical History:   Procedure Laterality Date    APPENDECTOMY      CARDIOVERSION  03/16/2021    CHOLECYSTECTOMY      COLONOSCOPY N/A 07/21/2021    COLONOSCOPY DIAGNOSTIC WITH POLYPECTOMY performed by Sergo Olivier MD at [facility]    GALLBLADDER SURGERY      THYROIDECTOMY      TONSILLECTOMY AND ADENOIDECTOMY      TUBAL LIGATION           No current facility-administered medications on file prior to encounter.  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

## 2024-07-23 PROBLEM — S81.852A CAT BITE OF LEFT LOWER LEG: Status: ACTIVE | Noted: 2024-07-22

## 2024-07-23 PROBLEM — R78.81 BACTEREMIA: Status: ACTIVE | Noted: 2024-07-23

## 2024-07-23 PROBLEM — L03.116 CELLULITIS OF LEFT LEG: Status: ACTIVE | Noted: 2024-07-23

## 2024-07-23 LAB
ALBUMIN SERPL-MCNC: 3.3 G/DL (ref 3.5–4.6)
ALP SERPL-CCNC: 120 U/L (ref 40–130)
ALT SERPL-CCNC: 25 U/L (ref 0–33)
ANION GAP SERPL CALCULATED.3IONS-SCNC: 9 MEQ/L (ref 9–15)
AST SERPL-CCNC: 22 U/L (ref 0–35)
BASOPHILS # BLD: 0.1 K/UL (ref 0–0.2)
BASOPHILS NFR BLD: 0.6 %
BILIRUB SERPL-MCNC: 0.9 MG/DL (ref 0.2–0.7)
BUN SERPL-MCNC: 15 MG/DL (ref 6–20)
CALCIUM SERPL-MCNC: 9.2 MG/DL (ref 8.5–9.9)
CHLORIDE SERPL-SCNC: 108 MEQ/L (ref 95–107)
CO2 SERPL-SCNC: 29 MEQ/L (ref 20–31)
CREAT SERPL-MCNC: 1.13 MG/DL (ref 0.5–0.9)
EOSINOPHIL # BLD: 0.3 K/UL (ref 0–0.7)
EOSINOPHIL NFR BLD: 3.6 %
ERYTHROCYTE [DISTWIDTH] IN BLOOD BY AUTOMATED COUNT: 14.1 % (ref 11.5–14.5)
GLOBULIN SER CALC-MCNC: 3.1 G/DL (ref 2.3–3.5)
GLUCOSE SERPL-MCNC: 97 MG/DL (ref 70–99)
HCT VFR BLD AUTO: 41 % (ref 37–47)
HGB BLD-MCNC: 13.4 G/DL (ref 12–16)
LYMPHOCYTES # BLD: 2.1 K/UL (ref 1–4.8)
LYMPHOCYTES NFR BLD: 22.7 %
MCH RBC QN AUTO: 31.5 PG (ref 27–31.3)
MCHC RBC AUTO-ENTMCNC: 32.7 % (ref 33–37)
MCV RBC AUTO: 96.5 FL (ref 79.4–94.8)
MONOCYTES # BLD: 1 K/UL (ref 0.2–0.8)
MONOCYTES NFR BLD: 10.3 %
NEUTROPHILS # BLD: 5.9 K/UL (ref 1.4–6.5)
NEUTS SEG NFR BLD: 62.4 %
PLATELET # BLD AUTO: 225 K/UL (ref 130–400)
POTASSIUM SERPL-SCNC: 4.1 MEQ/L (ref 3.4–4.9)
PROT SERPL-MCNC: 6.4 G/DL (ref 6.3–8)
RBC # BLD AUTO: 4.25 M/UL (ref 4.2–5.4)
SODIUM SERPL-SCNC: 146 MEQ/L (ref 135–144)
WBC # BLD AUTO: 9.4 K/UL (ref 4.8–10.8)

## 2024-07-23 PROCEDURE — 2580000003 HC RX 258: Performed by: INTERNAL MEDICINE

## 2024-07-23 PROCEDURE — 6360000002 HC RX W HCPCS: Performed by: INTERNAL MEDICINE

## 2024-07-23 PROCEDURE — 36415 COLL VENOUS BLD VENIPUNCTURE: CPT

## 2024-07-23 PROCEDURE — 85025 COMPLETE CBC W/AUTO DIFF WBC: CPT

## 2024-07-23 PROCEDURE — 80053 COMPREHEN METABOLIC PANEL: CPT

## 2024-07-23 PROCEDURE — 99232 SBSQ HOSP IP/OBS MODERATE 35: CPT | Performed by: INTERNAL MEDICINE

## 2024-07-23 PROCEDURE — 6370000000 HC RX 637 (ALT 250 FOR IP): Performed by: INTERNAL MEDICINE

## 2024-07-23 PROCEDURE — 1210000000 HC MED SURG R&B

## 2024-07-23 RX ORDER — FOLIC ACID 1 MG/1
1000 TABLET ORAL DAILY
Status: DISCONTINUED | OUTPATIENT
Start: 2024-07-23 | End: 2024-07-25 | Stop reason: HOSPADM

## 2024-07-23 RX ORDER — UBIDECARENONE 75 MG
1000 CAPSULE ORAL DAILY
Status: DISCONTINUED | OUTPATIENT
Start: 2024-07-23 | End: 2024-07-25 | Stop reason: HOSPADM

## 2024-07-23 RX ORDER — LANOLIN ALCOHOL/MO/W.PET/CERES
100 CREAM (GRAM) TOPICAL DAILY
Status: DISCONTINUED | OUTPATIENT
Start: 2024-07-23 | End: 2024-07-25 | Stop reason: HOSPADM

## 2024-07-23 RX ADMIN — MUPIROCIN: 20 OINTMENT TOPICAL at 19:46

## 2024-07-23 RX ADMIN — MUPIROCIN: 20 OINTMENT TOPICAL at 10:00

## 2024-07-23 RX ADMIN — AMIODARONE HYDROCHLORIDE 200 MG: 200 TABLET ORAL at 08:26

## 2024-07-23 RX ADMIN — Medication 5 ML: at 19:16

## 2024-07-23 RX ADMIN — METOPROLOL SUCCINATE 100 MG: 100 TABLET, EXTENDED RELEASE ORAL at 08:25

## 2024-07-23 RX ADMIN — SODIUM CHLORIDE 1500 MG: 9 INJECTION, SOLUTION INTRAVENOUS at 17:04

## 2024-07-23 RX ADMIN — ATORVASTATIN CALCIUM 40 MG: 40 TABLET, FILM COATED ORAL at 08:26

## 2024-07-23 RX ADMIN — METRONIDAZOLE 500 MG: 500 INJECTION, SOLUTION INTRAVENOUS at 01:34

## 2024-07-23 RX ADMIN — APIXABAN 5 MG: 5 TABLET, FILM COATED ORAL at 08:26

## 2024-07-23 RX ADMIN — ASPIRIN 81 MG: 81 TABLET, COATED ORAL at 08:26

## 2024-07-23 RX ADMIN — CEFTRIAXONE SODIUM 2000 MG: 2 INJECTION, POWDER, FOR SOLUTION INTRAMUSCULAR; INTRAVENOUS at 10:12

## 2024-07-23 RX ADMIN — Medication 10 ML: at 08:27

## 2024-07-23 RX ADMIN — LEVOTHYROXINE SODIUM 175 MCG: 0.12 TABLET ORAL at 06:31

## 2024-07-23 RX ADMIN — Medication 100 MG: at 17:01

## 2024-07-23 RX ADMIN — APIXABAN 5 MG: 5 TABLET, FILM COATED ORAL at 19:45

## 2024-07-23 RX ADMIN — Medication 1000 MCG: at 17:01

## 2024-07-23 RX ADMIN — METOPROLOL SUCCINATE 100 MG: 100 TABLET, EXTENDED RELEASE ORAL at 19:45

## 2024-07-23 RX ADMIN — METRONIDAZOLE 500 MG: 500 INJECTION, SOLUTION INTRAVENOUS at 08:32

## 2024-07-23 RX ADMIN — FOLIC ACID 1000 MCG: 1 TABLET ORAL at 17:01

## 2024-07-23 RX ADMIN — GABAPENTIN 100 MG: 100 CAPSULE ORAL at 19:45

## 2024-07-23 RX ADMIN — LOSARTAN POTASSIUM 25 MG: 25 TABLET, FILM COATED ORAL at 08:26

## 2024-07-23 ASSESSMENT — ENCOUNTER SYMPTOMS
RESPIRATORY NEGATIVE: 1
COLOR CHANGE: 1
GASTROINTESTINAL NEGATIVE: 1

## 2024-07-23 NOTE — PROGRESS NOTES
Physician Progress Note    7/23/2024   4:13 PM    Name:  Manolo Poe  MRN:    [id number]     IP Day: 1     Admit Date: 7/21/2024 11:41 PM  PCP: Michel Justice MD    Code Status:  Full Code    Subjective:     Some discomfort in left lower extremity but otherwise doing fine.    Current Facility-Administered Medications   Medication Dose Route Frequency Provider Last Rate Last Admin    [START ON 7/24/2024] cefTRIAXone (ROCEPHIN) 1,000 mg in sodium chloride 0.9 % 50 mL IVPB (mini-bag)  1,000 mg IntraVENous Q12H Gabriel Toribio MD        vitamin B-12 (CYANOCOBALAMIN) tablet 1,000 mcg  1,000 mcg Oral Daily Juanjo Lind R, DO        folic acid (FOLVITE) tablet 1,000 mcg  1,000 mcg Oral Daily Juanjo Lind R, DO        vitamin B-6 (PYRIDOXINE) tablet 100 mg  100 mg Oral Daily Juanjo Lind R, DO        vancomycin (VANCOCIN) intermittent dosing (placeholder)   Other RX Placeholder Gabriel Toribio MD        vancomycin (VANCOCIN) 1,500 mg in sodium chloride 0.9 % 500 mL IVPB (ADDAVIAL)  1,500 mg IntraVENous Q24H Gabriel Toribio MD        sodium chloride flush 0.9 % injection 5-40 mL  5-40 mL IntraVENous 2 times per day Ama Knight, DO   10 mL at 07/23/24 0827    sodium chloride flush 0.9 % injection 5-40 mL  5-40 mL IntraVENous PRN Ama Knight, DO        0.9 % sodium chloride infusion   IntraVENous PRN Ama nKight DO        potassium chloride (KLOR-CON M) extended release tablet 40 mEq  40 mEq Oral PRN Ama Knight, DO        Or    potassium bicarb-citric acid (EFFER-K) effervescent tablet 40 mEq  40 mEq Oral PRN Ama Knight, DO        Or    potassium chloride 10 mEq/100 mL IVPB (Peripheral Line)  10 mEq IntraVENous PRN Ama Knight, DO        magnesium sulfate 2000 mg in 50 mL IVPB premix  2,000 mg IntraVENous PRN Ama Knight, DO        ondansetron (ZOFRAN-ODT) disintegrating tablet 4 mg  4 mg Oral Q8H PRN Ama Knight, DO        Or    ondansetron (ZOFRAN) injection 4 mg  4 mg IntraVENous Q6H PRN      Labs:  Recent Labs     07/21/24  2245 07/23/24  0503   WBC 16.5* 9.4   HGB 14.8 13.4    225     Recent Labs     07/22/24  0526 07/23/24  0503    146*   K 4.2 4.1    108*   CO2 28 29   BUN 21* 15   CREATININE 1.23* 1.13*   GLUCOSE 112* 97     Recent Labs     07/22/24  0526 07/23/24  0503   AST 27 22   ALT 28 25   BILITOT 0.6 0.9*   ALKPHOS 132* 120       Assessment and Plan:        1.  Sepsis (T>90, wbc>12) secondary to left lower extremity Pasteurella cellulitis with bacteremia secondary to cat bite  -IV antibiotics.  Noted Pasteurella and Staph aureus (unclear if MSSA or MRSA) and wound culture.  GNR in blood expected to be Pasteurella  -Euvolemic on exam  -ID managing antibiotics    Paroxysmal atrial fibrillation Eliquis  Hypothyroidism  Hypertension  Class III obesity    Diet: ADULT DIET; Regular; Vegetarian (Lacto-Ovo)  Full Code    Electronically signed by Juanjo Lind DO on 7/23/2024 at 4:13 PM

## 2024-07-23 NOTE — PLAN OF CARE
Problem: Discharge Planning  Goal: Discharge to home or other facility with appropriate resources  7/23/2024 1107 by Kelsy Jc RN  Outcome: Progressing  7/23/2024 0237 by Shell Aguilera RN  Outcome: Progressing     Problem: Skin/Tissue Integrity  Goal: Absence of new skin breakdown  Description: 1.  Monitor for areas of redness and/or skin breakdown  2.  Assess vascular access sites hourly  3.  Every 4-6 hours minimum:  Change oxygen saturation probe site  4.  Every 4-6 hours:  If on nasal continuous positive airway pressure, respiratory therapy assess nares and determine need for appliance change or resting period.  7/23/2024 1107 by Kelsy Jc, RN  Outcome: Progressing  7/23/2024 0237 by Shell Aguilera, RN  Outcome: Progressing     Problem: Safety - Adult  Goal: Free from fall injury  7/23/2024 1107 by Kelsy Jc, RN  Outcome: Progressing  7/23/2024 0237 by Shell Aguilera, RN  Outcome: Progressing

## 2024-07-23 NOTE — PROGRESS NOTES
[facility]   Pharmacy Pharmacokinetic Monitoring Service - Vancomycin     Manolo Poe is a 59 y.o. female starting on vancomycin therapy for SSTI. Pharmacy consulted by Dr. Toribio for monitoring and adjustment.    Target Concentration: Goal trough of 10-15 mg/L and AUC/DILLAN <500 mg*hr/L    Additional Antimicrobials: ceftriaxone    Pertinent Laboratory Values:   Wt Readings from Last 1 Encounters:   07/22/24 (!) 136.5 kg (301 lb)     Temp Readings from Last 1 Encounters:   07/23/24 97.9 °F (36.6 °C) (Oral)     Estimated Creatinine Clearance: 77 mL/min (A) (based on SCr of 1.13 mg/dL (H)).  Recent Labs     07/21/24  2245 07/22/24  0526 07/23/24  0503   CREATININE 1.34* 1.23* 1.13*   BUN 25* 21* 15   WBC 16.5*  --  9.4     Procalcitonin:   Procalcitonin   Date Value Ref Range Status   07/22/2024 0.15 0.00 - 0.15 ng/mL Final     Comment:     Suspected Sepsis:  Low likelihood of sepsis  <.50 ng/mL    Increased likelihood of sepsis 0.50-2.00 ng/mL  Antibiotics encouraged    High risk of sepsis/shock   >2.00 ng/mL  Antibiotics strongly encouraged    Suspected Lower Respiratory Tract Infections:  Low likelihood of bacterial infection  <0.24 ng/mL    Increased likelihood of bacterial infection >0.24 ng/mL  Antibiotics encouraged    With successful antibiotic therapy, PCT levels should decrease  rapidly. (Half-life of 24 to 36 hours.)    Procalcitonin values from samples collected within the first  6 hours of systemic infection may still be low.  Retesting may be indicated.  Values from day 1 and day 4 can be entered into the Change in  Procalcitonin Calculator to determine the patient's  Mortality Risk Prognosis  ([email or web address])    In healthy neonates, plasma Procalcitonin (PCT) concentrations  increase gradually after birth, reaching peak values at about  24 hours of age then decrease to normal values below 0.5 ng/mL  by 48-72 hours of age.           Pertinent

## 2024-07-23 NOTE — CARE COORDINATION
REFERRAL CALLED TO MAURICE AT [facility] FOR SN IN THE EVENT PT NEEDS IV ABX UPON DISCHARGE. WILL FOLLOW ID PLAN.

## 2024-07-23 NOTE — PROGRESS NOTES
Infectious Disease     Patient Name: Manolo Poe  Date: 7/23/2024  YOB: 1965  Medical Record Number: [id number]              History of Present Illness:   prior admission to the ICU with sepsis from cellulitis.   Sleep apnea Atrial fibrillation history of pulmonary emboli ventricular tachycardia    cat bite to her left lower leg 5 days pta   Rapidly progressive swelling pain site of bite left leg  No antibiotics   fever   no hypotension, no hypoxia,           Review of Systems   Constitutional:  Negative for chills, diaphoresis, fatigue and fever.   Respiratory: Negative.     Cardiovascular:  Positive for leg swelling.   Gastrointestinal: Negative.    Skin:  Positive for color change and wound.        Physical Exam  Cardiovascular:      Heart sounds: Normal heart sounds. No murmur heard.  Pulmonary:      Effort: Pulmonary effort is normal. No respiratory distress.      Breath sounds: Normal breath sounds. No wheezing, rhonchi or rales.   Abdominal:      General: Abdomen is flat. Bowel sounds are normal. There is no distension.      Palpations: Abdomen is soft. There is no mass.      Tenderness: There is no abdominal tenderness. There is no right CVA tenderness or guarding.   Musculoskeletal:         General: Swelling present.      Left lower leg: Edema present.   Skin:     Findings: Erythema present.      Comments: Left lower extremity shows marked redness from ankle to mid calf along with edema pain warmth         Blood pressure 130/73, pulse 67, temperature 97.9 °F (36.6 °C), temperature source Oral, resp. rate 16, height 1.676 m (5' 6\"), weight (!) 136.5 kg (301 lb), SpO2 100 %, not currently breastfeeding.      .   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

## 2024-07-24 ENCOUNTER — APPOINTMENT (OUTPATIENT)
Dept: INTERVENTIONAL RADIOLOGY/VASCULAR | Age: 59
End: 2024-07-24
Payer: COMMERCIAL

## 2024-07-24 LAB
CULTURE, BLOOD ID SENSITIVITY: ABNORMAL
ORGANISM: ABNORMAL
ORGANISM: ABNORMAL

## 2024-07-24 PROCEDURE — 99232 SBSQ HOSP IP/OBS MODERATE 35: CPT | Performed by: INTERNAL MEDICINE

## 2024-07-24 PROCEDURE — 6360000002 HC RX W HCPCS: Performed by: INTERNAL MEDICINE

## 2024-07-24 PROCEDURE — 36410 VNPNXR 3YR/> PHY/QHP DX/THER: CPT

## 2024-07-24 PROCEDURE — 2500000003 HC RX 250 WO HCPCS: Performed by: INTERNAL MEDICINE

## 2024-07-24 PROCEDURE — 2580000003 HC RX 258: Performed by: INTERNAL MEDICINE

## 2024-07-24 PROCEDURE — 6370000000 HC RX 637 (ALT 250 FOR IP): Performed by: INTERNAL MEDICINE

## 2024-07-24 PROCEDURE — 1210000000 HC MED SURG R&B

## 2024-07-24 PROCEDURE — 76937 US GUIDE VASCULAR ACCESS: CPT

## 2024-07-24 PROCEDURE — C1751 CATH, INF, PER/CENT/MIDLINE: HCPCS

## 2024-07-24 RX ORDER — SODIUM CHLORIDE 9 MG/ML
INJECTION, SOLUTION INTRAVENOUS PRN
Status: DISCONTINUED | OUTPATIENT
Start: 2024-07-24 | End: 2024-07-25 | Stop reason: HOSPADM

## 2024-07-24 RX ORDER — SODIUM CHLORIDE 0.9 % (FLUSH) 0.9 %
5-40 SYRINGE (ML) INJECTION PRN
Status: DISCONTINUED | OUTPATIENT
Start: 2024-07-24 | End: 2024-07-25 | Stop reason: HOSPADM

## 2024-07-24 RX ORDER — SODIUM CHLORIDE 0.9 % (FLUSH) 0.9 %
5-40 SYRINGE (ML) INJECTION EVERY 12 HOURS SCHEDULED
Status: DISCONTINUED | OUTPATIENT
Start: 2024-07-24 | End: 2024-07-25 | Stop reason: HOSPADM

## 2024-07-24 RX ADMIN — SODIUM CHLORIDE 1500 MG: 9 INJECTION, SOLUTION INTRAVENOUS at 16:26

## 2024-07-24 RX ADMIN — FOLIC ACID 1000 MCG: 1 TABLET ORAL at 09:47

## 2024-07-24 RX ADMIN — LOSARTAN POTASSIUM 25 MG: 25 TABLET, FILM COATED ORAL at 09:47

## 2024-07-24 RX ADMIN — CEFTRIAXONE SODIUM 1000 MG: 1 INJECTION, POWDER, FOR SOLUTION INTRAMUSCULAR; INTRAVENOUS at 10:09

## 2024-07-24 RX ADMIN — Medication 100 MG: at 09:46

## 2024-07-24 RX ADMIN — METOPROLOL SUCCINATE 100 MG: 100 TABLET, EXTENDED RELEASE ORAL at 09:47

## 2024-07-24 RX ADMIN — MUPIROCIN: 20 OINTMENT TOPICAL at 20:15

## 2024-07-24 RX ADMIN — LEVOTHYROXINE SODIUM 175 MCG: 0.12 TABLET ORAL at 05:49

## 2024-07-24 RX ADMIN — SODIUM CHLORIDE, PRESERVATIVE FREE 10 ML: 5 INJECTION INTRAVENOUS at 15:22

## 2024-07-24 RX ADMIN — APIXABAN 5 MG: 5 TABLET, FILM COATED ORAL at 09:47

## 2024-07-24 RX ADMIN — ASPIRIN 81 MG: 81 TABLET, COATED ORAL at 09:46

## 2024-07-24 RX ADMIN — METOPROLOL SUCCINATE 100 MG: 100 TABLET, EXTENDED RELEASE ORAL at 20:10

## 2024-07-24 RX ADMIN — Medication 5 ML: at 19:01

## 2024-07-24 RX ADMIN — MUPIROCIN: 20 OINTMENT TOPICAL at 10:10

## 2024-07-24 RX ADMIN — AMIODARONE HYDROCHLORIDE 200 MG: 200 TABLET ORAL at 09:46

## 2024-07-24 RX ADMIN — Medication 1000 MCG: at 09:47

## 2024-07-24 RX ADMIN — LIDOCAINE HYDROCHLORIDE 5 ML: 10 SOLUTION INTRAVENOUS at 15:22

## 2024-07-24 RX ADMIN — Medication 10 ML: at 09:47

## 2024-07-24 RX ADMIN — Medication 10 ML: at 19:00

## 2024-07-24 RX ADMIN — SODIUM CHLORIDE, PRESERVATIVE FREE 10 ML: 5 INJECTION INTRAVENOUS at 16:27

## 2024-07-24 RX ADMIN — ATORVASTATIN CALCIUM 40 MG: 40 TABLET, FILM COATED ORAL at 09:47

## 2024-07-24 RX ADMIN — APIXABAN 5 MG: 5 TABLET, FILM COATED ORAL at 20:10

## 2024-07-24 RX ADMIN — CEFTRIAXONE SODIUM 1000 MG: 1 INJECTION, POWDER, FOR SOLUTION INTRAMUSCULAR; INTRAVENOUS at 20:15

## 2024-07-24 RX ADMIN — GABAPENTIN 100 MG: 100 CAPSULE ORAL at 20:10

## 2024-07-24 ASSESSMENT — ENCOUNTER SYMPTOMS
GASTROINTESTINAL NEGATIVE: 1
RESPIRATORY NEGATIVE: 1
COLOR CHANGE: 1

## 2024-07-24 NOTE — PLAN OF CARE
Problem: Discharge Planning  Goal: Discharge to home or other facility with appropriate resources  7/23/2024 2049 by Dileep Watts RN  Outcome: Progressing  7/23/2024 1107 by Kelsy Jc RN  Outcome: Progressing     Problem: Skin/Tissue Integrity  Goal: Absence of new skin breakdown  Description: 1.  Monitor for areas of redness and/or skin breakdown  2.  Assess vascular access sites hourly  3.  Every 4-6 hours minimum:  Change oxygen saturation probe site  4.  Every 4-6 hours:  If on nasal continuous positive airway pressure, respiratory therapy assess nares and determine need for appliance change or resting period.  7/23/2024 2049 by Dileep Watts, JOHN  Outcome: Progressing  7/23/2024 1107 by Kelsy Jc RN  Outcome: Progressing     Problem: Safety - Adult  Goal: Free from fall injury  7/23/2024 2049 by Dileep Watts, JOHN  Outcome: Progressing  7/23/2024 1107 by Kelsy Jc, RN  Outcome: Progressing

## 2024-07-24 NOTE — CARE COORDINATION
SPOKE W/FABRIZIO FROM [facility]. WE DO NOT NEED TO SEND AN UPDATED IV BENEFIT CHECK, PT  % IV ABX COVERAGE. CM TO FOLLOW. NEED ABX SCRIPT AND SERVICE AGREEMENT SIGNED AND FAXED.

## 2024-07-24 NOTE — PROGRESS NOTES
Infectious Disease     Patient Name: Manolo Poe  Date: 7/24/2024  YOB: 1965  Medical Record Number: [id number]              History of Present Illness:   prior admission to the ICU with sepsis from cellulitis.   Sleep apnea Atrial fibrillation history of pulmonary emboli ventricular tachycardia    cat bite to her left lower leg 5 days pta   Rapidly progressive swelling pain site of bite left leg  No antibiotics   fever   no hypotension, no hypoxia,           Review of Systems   Constitutional:  Negative for chills, diaphoresis, fatigue and fever.   Respiratory: Negative.     Cardiovascular:  Positive for leg swelling.   Gastrointestinal: Negative.    Skin:  Positive for color change and wound.        Physical Exam  Cardiovascular:      Heart sounds: Normal heart sounds. No murmur heard.  Pulmonary:      Effort: Pulmonary effort is normal. No respiratory distress.      Breath sounds: Normal breath sounds. No wheezing, rhonchi or rales.   Abdominal:      General: Abdomen is flat. Bowel sounds are normal. There is no distension.      Palpations: Abdomen is soft. There is no mass.      Tenderness: There is no abdominal tenderness. There is no right CVA tenderness or guarding.   Musculoskeletal:         General: Swelling present.      Left lower leg: Edema present.   Skin:     Findings: Erythema present.      Comments: Left lower extremity shows marked redness from ankle to mid calf along with edema pain warmth         Blood pressure 124/64, pulse 67, temperature 98.1 °F (36.7 °C), temperature source Oral, resp. rate 18, height 1.676 m (5' 6\"), weight (!) 136.5 kg (301 lb), SpO2 96 %, not currently breastfeeding.      .   Lab Results   Component Value Date    WBC 9.4 07/23/2024    HGB 13.4 07/23/2024    HCT 41.0 07/23/2024    MCV 96.5 (H) 07/23/2024     07/23/2024     Lab Results   Component Value Date/Time     07/23/2024 05:03 AM    K 4.1 07/23/2024 05:03 AM     07/23/2024 05:03 AM     CO2 29 07/23/2024 05:03 AM    BUN 15 07/23/2024 05:03 AM    CREATININE 1.13 07/23/2024 05:03 AM    GLUCOSE 97 07/23/2024 05:03 AM    CALCIUM 9.2 07/23/2024 05:03 AM    LABGLOM 56.3 07/23/2024 05:03 AM    LABGLOM 56.4 03/06/2024 02:44 PM        Micro results (current encounter only)    Procedure Component Value Units Date/Time   Culture, Wound [[id number]] (Abnormal) Collected: 07/22/24 0025   Order Status: Completed Specimen: Leg Updated: 07/23/24 1410    WOUND/ABSCESS -- Abnormal     Direct Exam:  NO NEUTROPHILS SEEN  Direct Exam:  NO BACTERIA SEEN  Performed at [address] [address] ([phone]   Abnormal     Organism Pasteurella multocida Abnormal     WOUND/ABSCESS --    LIGHT GROWTH  Identification by MALDI-TOF    Organism Staphylococcus aureus Abnormal     WOUND/ABSCESS LIGHT GROWTH   Narrative:     ORDER#: [id number]                          ORDERED BY: ALAN ESPANA  SOURCE: Leg                                COLLECTED:  07/22/24 00:25  ANTIBIOTICS AT CONNOR.:                      RECEIVED :  07/22/24 00:25   Culture, Blood ID Sensitivity [[id number]] Collected: 07/22/24 0009   Order Status: No result Updated: 07/22/24 1604   Culture, Blood 1 [[id number]] (Abnormal) Collected: 07/22/24 0009   Order Status: Completed Specimen: Blood Updated: 07/22/24 1602    Blood Culture, Routine -- Abnormal     Gram stain aerobic bottle  Gram negative rods  2 out of 2 blood cultures  Further results to follow   Abnormal    Narrative:     ORDER#: [id number]                          ORDERED BY: ALAN ESPANA  SOURCE: Blood                              COLLECTED:  07/22/24 00:09  ANTIBIOTICS AT CONNOR.:                      RECEIVED :  07/22/24 00:09   Culture, Blood ID Sensitivity [[id number]] Collected: 07/22/24 0009   Order Status: No result Updated: 07/22/24 1601   Blood ID, Molecular [[id number]] Collected: 07/22/24 0009   Order Status: Completed Updated: 07/22/24 1557    Acinetobacter

## 2024-07-24 NOTE — PROGRESS NOTES
[facility]   Pharmacy Pharmacokinetic Monitoring Service - Vancomycin    Consulting Provider: Dr. Toribio   Indication: SSTI  Target Concentration: Goal trough of 10-15 mg/L and AUC/DILLAN <500 mg*hr/L  Day of Therapy: 3  Additional Antimicrobials: ceftriaxone    Pertinent Laboratory Values:   Wt Readings from Last 1 Encounters:   07/22/24 (!) 136.5 kg (301 lb)     Temp Readings from Last 1 Encounters:   07/24/24 98.1 °F (36.7 °C) (Oral)     Estimated Creatinine Clearance: 77 mL/min (A) (based on SCr of 1.13 mg/dL (H)).  Recent Labs     07/21/24  2245 07/22/24  0526 07/23/24  0503   CREATININE 1.34* 1.23* 1.13*   BUN 25* 21* 15   WBC 16.5*  --  9.4     Procalcitonin: 0.15 on 7/22    Pertinent Cultures:  Culture Date Source Results   7/22/24 Blood 2/2 Pasturella multocida   7/22/24 Leg wound Pasturella multocida and staphylococcus aureus   MRSA Nasal Swab: N/A. Non-respiratory infection.    Recent vancomycin administrations                     vancomycin (VANCOCIN) 1,500 mg in sodium chloride 0.9 % 500 mL IVPB (ADDAVIAL) (mg) 1,500 mg New Bag 07/23/24 1704                  Plan:  Continue current dose of vancomycin 1500 mg Q24H  Vancomycin concentration ordered for 7/25/24 @ 0600   Pharmacy will continue to monitor patient and adjust therapy as indicated    Thank you for the consult,  Bessy Pa RPH  7/24/2024 1:20 PM

## 2024-07-24 NOTE — PROGRESS NOTES
10 mEq/100 mL IVPB (Peripheral Line)  10 mEq IntraVENous PRN Ama Knight DO        magnesium sulfate 2000 mg in 50 mL IVPB premix  2,000 mg IntraVENous PRN Ama Knight DO        ondansetron (ZOFRAN-ODT) disintegrating tablet 4 mg  4 mg Oral Q8H PRN Ama Knight DO        Or    ondansetron (ZOFRAN) injection 4 mg  4 mg IntraVENous Q6H PRN Ama Knight, DO        polyethylene glycol (GLYCOLAX) packet 17 g  17 g Oral Daily PRN Ama Knight,         acetaminophen (TYLENOL) tablet 650 mg  650 mg Oral Q6H PRN Ama Knight DO   650 mg at 24 0447    Or    acetaminophen (TYLENOL) suppository 650 mg  650 mg Rectal Q6H PRN Ama Knight DO        apixaban (ELIQUIS) tablet 5 mg  5 mg Oral BID Ama Knight, DO   5 mg at 24 0947    amiodarone (CORDARONE) tablet 200 mg  200 mg Oral Daily Ama Knight, DO   200 mg at 24 0946    aspirin EC tablet 81 mg  81 mg Oral Daily Ama Knight, DO   81 mg at 24 0946    atorvastatin (LIPITOR) tablet 40 mg  40 mg Oral Daily Ama Knight, DO   40 mg at 24 0947    gabapentin (NEURONTIN) capsule 100 mg  100 mg Oral Daily Ama Knight, DO   100 mg at 24 1945    levothyroxine (SYNTHROID) tablet 175 mcg  175 mcg Oral Daily Ama Knight, DO   175 mcg at 24 0549    losartan (COZAAR) tablet 25 mg  25 mg Oral Daily Ama Knight, DO   25 mg at 24 0947    metoprolol succinate (TOPROL XL) extended release tablet 100 mg  100 mg Oral BID Ama Knight, DO   100 mg at 24 0947    mupirocin (BACTROBAN) 2 % ointment   Topical BID Kendell Navarro, JOHN   Given at 24 1010       Physical Examination:      Vitals:  BP (!) 143/87   Pulse 64   Temp 97.7 °F (36.5 °C)   Resp 18   Ht 1.676 m (5' 6\")   Wt (!) 136.5 kg (301 lb)   LMP  (LMP Unknown)   SpO2 100%   BMI 48.58 kg/m²   Temp (24hrs), Av °F (36.7 °C), Min:97.7 °F (36.5 °C), Max:98.2 °F (36.8 °C)      General appearance: alert, cooperative and no distress.   Obese  Mental Status: oriented to person, place and time and normal affect  Lungs: clear to auscultation bilaterally, normal effort  Heart: regular rate and rhythm, no murmur  Abdomen: soft, nontender, nondistended, bowel sounds present, no masses  Extremities: Erythema, warmth, and tenderness to palpation of left lower extremity    Data:     Labs:  Recent Labs     07/21/24  2245 07/23/24  0503   WBC 16.5* 9.4   HGB 14.8 13.4    225     Recent Labs     07/22/24  0526 07/23/24  0503    146*   K 4.2 4.1    108*   CO2 28 29   BUN 21* 15   CREATININE 1.23* 1.13*   GLUCOSE 112* 97     Recent Labs     07/22/24  0526 07/23/24  0503   AST 27 22   ALT 28 25   BILITOT 0.6 0.9*   ALKPHOS 132* 120       Assessment and Plan:        1.  Sepsis (T>90, wbc>12) secondary to left lower extremity Pasteurella cellulitis with bacteremia secondary to cat bite  -IV antibiotics for 2 weeks upon discharge.  Midline ordered.  Noted Pasteurella and Staph aureus (unclear if MSSA or MRSA) in wound culture.   -Euvolemic on exam  -ID managing antibiotics    Paroxysmal atrial fibrillation Eliquis  Hypothyroidism  Hypertension  Class III obesity    Diet: ADULT DIET; Regular; Vegetarian (Lacto-Ovo)  Full Code    Electronically signed by Juanjo Lind DO on 7/24/2024 at 3:01 PM

## 2024-07-24 NOTE — CARE COORDINATION
MET W/PT TO ASSESS NEEDS AND DISCUSS DISCHARGE PLAN WHICH IS HOME W/SPOUSE. PT AGREEABLE TO HHC IF IV ABX INDICATED. [facility] FOLLOWING. WILL FOLLOW ID PLAN.   1500- MIDLINE ORDERED. ID PLAN, TWO WEEKS IV ABX, SENSITIVITIES PENDING. WILL FOLLOW. UPDATED [facility]. UPDATED [facility] AT [facility].

## 2024-07-25 ENCOUNTER — TELEPHONE (OUTPATIENT)
Dept: FAMILY MEDICINE CLINIC | Age: 59
End: 2024-07-25

## 2024-07-25 VITALS
OXYGEN SATURATION: 95 % | BODY MASS INDEX: 47.09 KG/M2 | WEIGHT: 293 LBS | TEMPERATURE: 97.9 F | DIASTOLIC BLOOD PRESSURE: 81 MMHG | SYSTOLIC BLOOD PRESSURE: 143 MMHG | HEIGHT: 66 IN | HEART RATE: 62 BPM | RESPIRATION RATE: 18 BRPM

## 2024-07-25 LAB
ALBUMIN SERPL-MCNC: 3.1 G/DL (ref 3.5–4.6)
ALP SERPL-CCNC: 113 U/L (ref 40–130)
ALT SERPL-CCNC: 22 U/L (ref 0–33)
ANION GAP SERPL CALCULATED.3IONS-SCNC: 9 MEQ/L (ref 9–15)
AST SERPL-CCNC: 32 U/L (ref 0–35)
BACTERIA SPEC ANAEROBE+AEROBE CULT: ABNORMAL
BASOPHILS # BLD: 0.1 K/UL (ref 0–0.2)
BASOPHILS NFR BLD: 0.9 %
BILIRUB SERPL-MCNC: 0.4 MG/DL (ref 0.2–0.7)
BUN SERPL-MCNC: 12 MG/DL (ref 6–20)
CALCIUM SERPL-MCNC: 8.7 MG/DL (ref 8.5–9.9)
CHLORIDE SERPL-SCNC: 107 MEQ/L (ref 95–107)
CO2 SERPL-SCNC: 27 MEQ/L (ref 20–31)
CREAT SERPL-MCNC: 0.99 MG/DL (ref 0.5–0.9)
EOSINOPHIL # BLD: 0.4 K/UL (ref 0–0.7)
EOSINOPHIL NFR BLD: 5.2 %
ERYTHROCYTE [DISTWIDTH] IN BLOOD BY AUTOMATED COUNT: 13.8 % (ref 11.5–14.5)
GLOBULIN SER CALC-MCNC: 3.2 G/DL (ref 2.3–3.5)
GLUCOSE SERPL-MCNC: 108 MG/DL (ref 70–99)
HCT VFR BLD AUTO: 40 % (ref 37–47)
HGB BLD-MCNC: 13.2 G/DL (ref 12–16)
LYMPHOCYTES # BLD: 1.8 K/UL (ref 1–4.8)
LYMPHOCYTES NFR BLD: 22.8 %
MCH RBC QN AUTO: 31.6 PG (ref 27–31.3)
MCHC RBC AUTO-ENTMCNC: 33 % (ref 33–37)
MCV RBC AUTO: 95.7 FL (ref 79.4–94.8)
MONOCYTES # BLD: 0.6 K/UL (ref 0.2–0.8)
MONOCYTES NFR BLD: 8.2 %
NEUTROPHILS # BLD: 4.9 K/UL (ref 1.4–6.5)
NEUTS SEG NFR BLD: 62.6 %
ORGANISM: ABNORMAL
ORGANISM: ABNORMAL
PLATELET # BLD AUTO: 232 K/UL (ref 130–400)
PLATELET BLD QL SMEAR: ADEQUATE
POTASSIUM SERPL-SCNC: 3.8 MEQ/L (ref 3.4–4.9)
PROT SERPL-MCNC: 6.3 G/DL (ref 6.3–8)
RBC # BLD AUTO: 4.18 M/UL (ref 4.2–5.4)
RBC MORPH BLD: NORMAL
SLIDE REVIEW: ABNORMAL
SODIUM SERPL-SCNC: 143 MEQ/L (ref 135–144)
VANCOMYCIN SERPL-MCNC: 13 UG/ML (ref 10–40)
WBC # BLD AUTO: 7.9 K/UL (ref 4.8–10.8)

## 2024-07-25 PROCEDURE — 80202 ASSAY OF VANCOMYCIN: CPT

## 2024-07-25 PROCEDURE — 6370000000 HC RX 637 (ALT 250 FOR IP): Performed by: INTERNAL MEDICINE

## 2024-07-25 PROCEDURE — 2580000003 HC RX 258: Performed by: INTERNAL MEDICINE

## 2024-07-25 PROCEDURE — 6360000002 HC RX W HCPCS: Performed by: INTERNAL MEDICINE

## 2024-07-25 PROCEDURE — 80053 COMPREHEN METABOLIC PANEL: CPT

## 2024-07-25 PROCEDURE — 99232 SBSQ HOSP IP/OBS MODERATE 35: CPT | Performed by: INTERNAL MEDICINE

## 2024-07-25 PROCEDURE — 85025 COMPLETE CBC W/AUTO DIFF WBC: CPT

## 2024-07-25 PROCEDURE — 36415 COLL VENOUS BLD VENIPUNCTURE: CPT

## 2024-07-25 RX ORDER — ONDANSETRON 4 MG/1
4 TABLET, ORALLY DISINTEGRATING ORAL EVERY 8 HOURS PRN
Qty: 20 TABLET | Refills: 0 | Status: SHIPPED | OUTPATIENT
Start: 2024-07-25

## 2024-07-25 RX ORDER — ONDANSETRON 4 MG/1
4 TABLET, ORALLY DISINTEGRATING ORAL EVERY 8 HOURS PRN
Qty: 20 TABLET | Refills: 0 | Status: SHIPPED | OUTPATIENT
Start: 2024-07-25 | End: 2024-07-25

## 2024-07-25 RX ADMIN — LOSARTAN POTASSIUM 25 MG: 25 TABLET, FILM COATED ORAL at 08:45

## 2024-07-25 RX ADMIN — ASPIRIN 81 MG: 81 TABLET, COATED ORAL at 08:45

## 2024-07-25 RX ADMIN — SODIUM CHLORIDE 1500 MG: 9 INJECTION, SOLUTION INTRAVENOUS at 10:05

## 2024-07-25 RX ADMIN — ERTAPENEM SODIUM 1000 MG: 1 INJECTION INTRAMUSCULAR; INTRAVENOUS at 15:46

## 2024-07-25 RX ADMIN — Medication 10 ML: at 08:54

## 2024-07-25 RX ADMIN — METOPROLOL SUCCINATE 100 MG: 100 TABLET, EXTENDED RELEASE ORAL at 08:45

## 2024-07-25 RX ADMIN — ATORVASTATIN CALCIUM 40 MG: 40 TABLET, FILM COATED ORAL at 08:45

## 2024-07-25 RX ADMIN — CEFTRIAXONE SODIUM 1000 MG: 1 INJECTION, POWDER, FOR SOLUTION INTRAMUSCULAR; INTRAVENOUS at 08:53

## 2024-07-25 RX ADMIN — Medication 100 MG: at 08:45

## 2024-07-25 RX ADMIN — AMIODARONE HYDROCHLORIDE 200 MG: 200 TABLET ORAL at 08:45

## 2024-07-25 RX ADMIN — FOLIC ACID 1000 MCG: 1 TABLET ORAL at 08:45

## 2024-07-25 RX ADMIN — APIXABAN 5 MG: 5 TABLET, FILM COATED ORAL at 08:45

## 2024-07-25 RX ADMIN — MUPIROCIN: 20 OINTMENT TOPICAL at 08:51

## 2024-07-25 RX ADMIN — Medication 1000 MCG: at 08:45

## 2024-07-25 RX ADMIN — LEVOTHYROXINE SODIUM 175 MCG: 0.12 TABLET ORAL at 05:27

## 2024-07-25 ASSESSMENT — ENCOUNTER SYMPTOMS
COLOR CHANGE: 1
RESPIRATORY NEGATIVE: 1
GASTROINTESTINAL NEGATIVE: 1

## 2024-07-25 NOTE — PROGRESS NOTES
Pharmacy Vancomycin Consult     Vancomycin Day: 4  Current Dosing: Vanco 1500mg IV q24h     Recent Labs     07/23/24  0503 07/25/24  0600   BUN 15 12   CREATININE 1.13* 0.99*   WBC 9.4 7.9       Intake/Output Summary (Last 24 hours) at 7/25/2024 0708  Last data filed at 7/25/2024 0649  Gross per 24 hour   Intake 1699.72 ml   Output --   Net 1699.72 ml     Cultures  Recent Labs     07/22/24  0025 07/22/24  0009   BC  --  Gram stain aerobic bottle  Gram negative rods  2 out of 2 blood cultures  Further results to follow  *   BLOODCULT2  --  Gram stain anaerobic bottle  Gram stain aerobic bottle  Gram negative rods  1 out of 2 blood cultures  Further results to follow  *   ORG Pasteurella multocida*  Staphylococcus aureus* Pasteurella multocida*  Pasteurella multocida*   WNDABS Direct Exam:  NO NEUTROPHILS SEEN  Direct Exam:  NO BACTERIA SEEN  Performed at [address] [id number] ([phone]  *  LIGHT GROWTH  Identification by MALDI-TOF    LIGHT GROWTH  --      Height: 167.6 cm (5' 6\"), Weight - Scale: (!) 136.5 kg (301 lb), Body mass index is 48.58 kg/m².    Estimated Creatinine Clearance: 88 mL/min (A) (based on SCr of 0.99 mg/dL (H)).  .    Trough:  Recent Labs     07/25/24  0600   VANCORANDOM 13.0      Assessment/Plan:  Data input into [employer] platform. Current dosing predicted sub-therapeutic for goal. Will increase dosing to Vanco 1500mg IV q18h. Predicted therapeutic  trough 11.7. Plan repeat level in 24-48 hours to further assess dosing. Timing of future trough levels may be adjusted based on culture results, renal function, and clinical response.    Thank you,  Helena Melgar, [facility] PharmD

## 2024-07-25 NOTE — PLAN OF CARE
Problem: Discharge Planning  Goal: Discharge to home or other facility with appropriate resources  Outcome: Completed  Flowsheets (Taken 7/25/2024 1015)  Discharge to home or other facility with appropriate resources: Identify barriers to discharge with patient and caregiver     Problem: Skin/Tissue Integrity  Goal: Absence of new skin breakdown  Description: 1.  Monitor for areas of redness and/or skin breakdown  2.  Assess vascular access sites hourly  3.  Every 4-6 hours minimum:  Change oxygen saturation probe site  4.  Every 4-6 hours:  If on nasal continuous positive airway pressure, respiratory therapy assess nares and determine need for appliance change or resting period.  Outcome: Completed     Problem: Safety - Adult  Goal: Free from fall injury  Outcome: Completed

## 2024-07-25 NOTE — TELEPHONE ENCOUNTER
Janny from [facility] wants to know if you will follow the patient. [phone]

## 2024-07-25 NOTE — PLAN OF CARE
Problem: Discharge Planning  Goal: Discharge to home or other facility with appropriate resources  Outcome: Progressing  Flowsheets (Taken 7/24/2024 1010 by Michelle Mcdaniel, RN)  Discharge to home or other facility with appropriate resources:   Identify barriers to discharge with patient and caregiver   Arrange for needed discharge resources and transportation as appropriate   Identify discharge learning needs (meds, wound care, etc)     Problem: Skin/Tissue Integrity  Goal: Absence of new skin breakdown  Description: 1.  Monitor for areas of redness and/or skin breakdown  2.  Assess vascular access sites hourly  3.  Every 4-6 hours minimum:  Change oxygen saturation probe site  4.  Every 4-6 hours:  If on nasal continuous positive airway pressure, respiratory therapy assess nares and determine need for appliance change or resting period.  Outcome: Progressing     Problem: Safety - Adult  Goal: Free from fall injury  Outcome: Progressing

## 2024-07-25 NOTE — PROGRESS NOTES
AM    CO2 27 07/25/2024 06:00 AM    BUN 12 07/25/2024 06:00 AM    CREATININE 0.99 07/25/2024 06:00 AM    GLUCOSE 108 07/25/2024 06:00 AM    CALCIUM 8.7 07/25/2024 06:00 AM    LABGLOM 66.0 07/25/2024 06:00 AM    LABGLOM 56.4 03/06/2024 02:44 PM        Micro results (current encounter only)    Procedure Component Value Units Date/Time   Culture, Wound [[id number]] (Abnormal) Collected: 07/22/24 0025   Order Status: Completed Specimen: Leg Updated: 07/23/24 1410    WOUND/ABSCESS -- Abnormal     Direct Exam:  NO NEUTROPHILS SEEN  Direct Exam:  NO BACTERIA SEEN  Performed at [address] [address] ([phone]   Abnormal     Organism Pasteurella multocida Abnormal     WOUND/ABSCESS --    LIGHT GROWTH  Identification by MALDI-TOF    Organism Staphylococcus aureus Abnormal     WOUND/ABSCESS LIGHT GROWTH   Narrative:     ORDER#: [id number]                          ORDERED BY: ALAN ESPANA  SOURCE: Leg                                COLLECTED:  07/22/24 00:25  ANTIBIOTICS AT CONNOR.:                      RECEIVED :  07/22/24 00:25   Culture, Blood ID Sensitivity [[id number]] Collected: 07/22/24 0009   Order Status: No result Updated: 07/22/24 1604   Culture, Blood 1 [[id number]] (Abnormal) Collected: 07/22/24 0009   Order Status: Completed Specimen: Blood Updated: 07/22/24 1602    Blood Culture, Routine -- Abnormal     Gram stain aerobic bottle  Gram negative rods  2 out of 2 blood cultures  Further results to follow   Abnormal    Narrative:     ORDER#: [id number]                          ORDERED BY: ALAN ESPANA  SOURCE: Blood                              COLLECTED:  07/22/24 00:09  ANTIBIOTICS AT CONNOR.:                      RECEIVED :  07/22/24 00:09   Culture, Blood ID Sensitivity [[id number]] Collected: 07/22/24 0009   Order Status: No result Updated: 07/22/24 1601   Blood ID, Molecular [[id number]] Collected: 07/22/24 0009   Order Status: Completed Updated: 07/22/24 1555     (paroxysmal atrial fibrillation) (HCC)    Abnormal stress test    Polyp of colon    ALEYDA (obstructive sleep apnea)    Chronic venous hypertension (idiopathic) with ulcer and inflammation of left lower extremity (HCC)    Morbid obesity (HCC)    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

## 2024-07-25 NOTE — CARE COORDINATION
PT PLANS TO GO HOME WITH [facility].  GINI ALMONTE'D PT BEING TAUGHT TO ADM HER OWN IV ATX.  PENDING ID SCRIPT.

## 2024-07-25 NOTE — DISCHARGE SUMMARY
[facility] Discharge Summary    Manolo Poe  :  1965  MRN:  [id number]    Admit date:  2024  Discharge date:  2024    Admitting Physician:  Ama Knight DO  Primary Care Physician:  Michel Justice MD    Discharge Diagnoses:    Principal Problem:    Cat bite of left lower leg  Active Problems:    Bacteremia    Cellulitis of left leg  Resolved Problems:    * No resolved hospital problems. *    Chief Complaint   Patient presents with    Wound Check       Condition: improved   Activity: no restrictions   Diet: regular  Disposition: home  Functional Status: ambulatory    Significant Findings:     Recent Labs     24  0600 24  0503 24  2245   WBC 7.9 9.4 16.5*   HGB 13.2 13.4 14.8   HCT 40.0 41.0 44.6   MCV 95.7* 96.5* 94.9*    225 255     2/2 blood cultures positive for Pasteurella multocida  Wound culture positive for Pasteurella multocida and MSSA      Hospital Course:   59-year-old female with paroxysmal atrial fibrillation on Eliquis was hospitalized for sepsis secondary to left lower extremity Pasteurella cellulitis with bacteremia secondary to cat bite.  Her wound culture was positive for both Pasteurella and MSSA, while her blood cultures were positive for Pasteurella as described above.  She will continue on IV antibiotics for 2 weeks at discharge per recommendation of infectious disease.  Please refer to their separate documentation regarding specifics of antibiotics.      Exam on Discharge:   /75   Pulse 70   Temp 98.2 °F (36.8 °C) (Oral)   Resp 18   Ht 1.676 m (5' 6\")   Wt (!) 136.5 kg (301 lb)   LMP  (LMP Unknown)   SpO2 100%   BMI 48.58 kg/m²   General appearance: alert, cooperative and no distress.  Obese  Mental Status: oriented to person, place and time and normal affect  Lungs: clear to auscultation bilaterally, normal effort  Heart: regular rate and rhythm, no murmur  Abdomen: soft, nontender, nondistended,

## 2024-07-25 NOTE — DISCHARGE INSTR - COC
Continuity of Care Form    Patient Name: Manolo Poe   :  1965  MRN:  [id number]    Admit date:  2024  Discharge date:  24    Code Status Order: Full Code   Advance Directives:     Admitting Physician:  Ama Knight DO  PCP: Michel Justice MD    Discharging Nurse: Kelsy LOPEZ  Discharging Hospital Unit/Room#: W486/W486-01  Discharging Unit Phone Number: [phone]    Emergency Contact:   Extended Emergency Contact Information  Primary Emergency Contact: Calli Poe  Home Phone: [phone]  Mobile Phone: [phone]  Relation: Child    Past Surgical History:  Past Surgical History:   Procedure Laterality Date    APPENDECTOMY      CARDIOVERSION  2021    CHOLECYSTECTOMY      COLONOSCOPY N/A 2021    COLONOSCOPY DIAGNOSTIC WITH POLYPECTOMY performed by Sergo Olivier MD at [facility]    GALLBLADDER SURGERY      IR MIDLINE CATH  2024    IR MIDLINE CATH 2024 [facility] SPECIAL PROCEDURE    THYROIDECTOMY      TONSILLECTOMY AND ADENOIDECTOMY      TUBAL LIGATION         Immunization History:   Immunization History   Administered Date(s) Administered    COVID-19, MODERNA BLUE border, Primary or Immunocompromised, (age 12y+), IM, 100 mcg/0.5mL 2021, 2021    COVID-19, MODERNA Bivalent, (age 12y+), IM, 50 mcg/0.5 mL 10/13/2022    Influenza Virus Vaccine 2014, 2015, 2016, 2017    Pneumococcal, PPSV23, PNEUMOVAX 23, (age 2y+), SC/IM, 0.5mL 2014    Td, unspecified formulation 2014       Active Problems:  Patient Active Problem List   Diagnosis Code    Postoperative hypothyroidism E89.0    Papillary thyroid carcinoma (HCC) C73    PAF (paroxysmal atrial fibrillation) (HCC) I48.0    Abnormal stress test R94.39    Polyp of colon K63.5    ALEYDA (obstructive sleep apnea) G47.33    Chronic venous hypertension (idiopathic) with ulcer and inflammation of left lower extremity (HCC) I87.332    Morbid obesity (HCC) E66.01    Chronic saddle pulmonary

## 2024-07-25 NOTE — PROGRESS NOTES
Physician Progress Note      PATIENT:               JUAN JAMES  CSN #:                  [id number]  :                       1965  ADMIT DATE:       2024 11:41 PM  DISCH DATE:        2024 4:24 PM  RESPONDING  PROVIDER #:        Juanjo WilsonRDSIMONE DO          QUERY TEXT:    Pt admitted on  with infected cat bite and possible cellulitis. Patient   does not meet sepsis criteria per H&P. Sepsis was subsequently noted by Dr. Lind starting . On arrival WBC 16.5, . If possible, please   document in progress notes and discharge summary the present on admission   status of sepsis:    The medical record reflects the following:  Risk Factors:  left lower extremity Pasteurella cellulitis with bacteremia   secondary to cat bite  Clinical Indicators: H&P \"ED considered patient to meet basic sepsis criteria   under sepsis-1 measures, but patient does not meet sepsis criteria under   current best practice sepsis-3 criteria (qSOFA score = 0).\"   WBC 16.5. . 2/ blood cultures showed gram negative rods. Wound   cultures growing Pasteurella and Staphylococcus aureus.  - Dr. Lind \"Sepsis (T>90, wbc>12) secondary to left lower extremity   Pasteurella cellulitis with bacteremia secondary to cat bite\".  Treatment: NS 1700 ml bolus in ED, Rocephin, Vancomycin, Flagyl, cultures, ID   consult  .  Thank you, Kathia Joseph RN BSN CDS  [phone]  Options provided:  -- After study,  sepsis secondary to left lower extremity Pasteurella   cellulitis and Gram negative bacteremia was confirmed present on admission  -- Other - I will add my own diagnosis  -- Disagree - Not applicable / Not valid  -- Disagree - Clinically unable to determine / Unknown  -- Refer to Clinical Documentation Reviewer    PROVIDER RESPONSE TEXT:    After study, sepsis secondary to left lower extremity Pasteurella cellulitis   and Gram negative bacteremia was confirmed present on admission    Query created by:

## 2024-07-26 ENCOUNTER — TELEPHONE (OUTPATIENT)
Dept: FAMILY MEDICINE CLINIC | Age: 59
End: 2024-07-26

## 2024-07-26 NOTE — TELEPHONE ENCOUNTER
Care Transitions Initial Follow Up Call    Outreach made within 2 business days of discharge: Yes    Patient: Manolo Poe Patient : 1965   MRN: [id number]  Reason for Admission: Cat bite of left lower leg   Discharge Date: 24       Spoke with: PT    Discharge department/facility: [facility]    TCM Interactive Patient Contact:  Was patient able to fill all prescriptions: Yes  Was patient instructed to bring all medications to the follow-up visit: Yes  Is patient taking all medications as directed in the discharge summary? Yes  Does patient understand their discharge instructions: Yes  Does patient have questions or concerns that need addressed prior to 7-14 day follow up office visit: no    Additional needs identified to be addressed with provider  No needs identified             Scheduled appointment with PCP within 7-14 days    Follow Up  Future Appointments   Date Time Provider Department Center   2024 12:00 PM Michel Justice MD [facility]   2024  2:30 PM Barney Ty MD [facility]   2024  1:45 PM Patrick Preston MD [facility] Kathryn Dean, MA

## 2024-07-29 ENCOUNTER — TELEPHONE (OUTPATIENT)
Dept: INFECTIOUS DISEASES | Age: 59
End: 2024-07-29

## 2024-07-29 DIAGNOSIS — L03.116 CELLULITIS OF LEFT LEG: ICD-10-CM
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
## 2024-07-29 DIAGNOSIS — R78.81 BACTEREMIA: ICD-10-CM

## 2024-07-29 DIAGNOSIS — S81.852D CAT BITE OF LEFT LOWER LEG, SUBSEQUENT ENCOUNTER: Primary | ICD-10-CM

## 2024-07-29 NOTE — TELEPHONE ENCOUNTER
Call to patient, to advise the [facility] Dept can see her tomorrow, 7/30/24 at 8am. Patient said she is able to go at this time. Advised to please arrive by 7:45am to register in the Radiology Dept. Pt verbalized understanding.

## 2024-07-29 NOTE — PROGRESS NOTES
Home health care had a visit over the weekend and found the Midline Leaking. Patient was told not to use until replaced. Reviewed order with Dr Griffin, she states to Order the Midline.   Call to [facility] Dept, vo.  Call to patient and she confirms she is unable to use the Midline and she does have a means of transportation.

## 2024-07-29 NOTE — TELEPHONE ENCOUNTER
Received call from Theresa( [facility]) States over the weekend on call Nurse page and Spoke with  about patient Midline need to replace. States patient midline is leaking.  We will consult ID Doctor.

## 2024-07-30 ENCOUNTER — HOSPITAL ENCOUNTER (OUTPATIENT)
Dept: INTERVENTIONAL RADIOLOGY/VASCULAR | Age: 59
Discharge: HOME | End: 2024-08-01
Payer: COMMERCIAL

## 2024-07-30 DIAGNOSIS — L03.116 CELLULITIS OF LEFT LEG: ICD-10-CM

## 2024-07-30 DIAGNOSIS — S81.852D CAT BITE OF LEFT LOWER LEG, SUBSEQUENT ENCOUNTER: ICD-10-CM
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
## 2024-07-30 DIAGNOSIS — R78.81 BACTEREMIA: ICD-10-CM

## 2024-07-30 PROCEDURE — 76937 US GUIDE VASCULAR ACCESS: CPT

## 2024-07-30 PROCEDURE — 36410 VNPNXR 3YR/> PHY/QHP DX/THER: CPT

## 2024-07-30 PROCEDURE — 2500000003 HC RX 250 WO HCPCS: Performed by: INTERNAL MEDICINE

## 2024-07-30 PROCEDURE — 2709999900 IR MIDLINE CATH

## 2024-07-30 RX ORDER — LIDOCAINE HYDROCHLORIDE 10 MG/ML
5 INJECTION, SOLUTION INFILTRATION; PERINEURAL ONCE
Status: COMPLETED | OUTPATIENT
Start: 2024-07-30 | End: 2024-07-30

## 2024-07-30 RX ADMIN — LIDOCAINE HYDROCHLORIDE 5 ML: 10 SOLUTION INTRAVENOUS at 08:52

## 2024-08-05 ENCOUNTER — TELEPHONE (OUTPATIENT)
Dept: INFECTIOUS DISEASES | Age: 59
End: 2024-08-05

## 2024-08-05 ENCOUNTER — HOSPITAL ENCOUNTER (OUTPATIENT)
Age: 59
Setting detail: SPECIMEN
Discharge: HOME | End: 2024-08-05
Payer: COMMERCIAL

## 2024-08-05 LAB
ANION GAP SERPL CALCULATED.3IONS-SCNC: 9 MEQ/L (ref 9–15)
BASOPHILS # BLD: 0.1 K/UL (ref 0–0.2)
BASOPHILS NFR BLD: 1.6 %
BUN SERPL-MCNC: 17 MG/DL (ref 6–20)
CALCIUM SERPL-MCNC: 9.5 MG/DL (ref 8.5–9.9)
CHLORIDE SERPL-SCNC: 104 MEQ/L (ref 95–107)
CO2 SERPL-SCNC: 31 MEQ/L (ref 20–31)
CREAT SERPL-MCNC: 1.23 MG/DL (ref 0.5–0.9)
EOSINOPHIL # BLD: 0.2 K/UL (ref 0–0.7)
EOSINOPHIL NFR BLD: 3.4 %
ERYTHROCYTE [DISTWIDTH] IN BLOOD BY AUTOMATED COUNT: 13.5 % (ref 11.5–14.5)
GLUCOSE SERPL-MCNC: 71 MG/DL (ref 70–99)
HCT VFR BLD AUTO: 44.7 % (ref 37–47)
HGB BLD-MCNC: 14.3 G/DL (ref 12–16)
LYMPHOCYTES # BLD: 2.1 K/UL (ref 1–4.8)
LYMPHOCYTES NFR BLD: 30.4 %
MCH RBC QN AUTO: 31.5 PG (ref 27–31.3)
MCHC RBC AUTO-ENTMCNC: 32 % (ref 33–37)
MCV RBC AUTO: 98.5 FL (ref 79.4–94.8)
MONOCYTES # BLD: 0.5 K/UL (ref 0.2–0.8)
MONOCYTES NFR BLD: 7.2 %
NEUTROPHILS # BLD: 4 K/UL (ref 1.4–6.5)
NEUTS SEG NFR BLD: 57.1 %
PLATELET # BLD AUTO: 275 K/UL (ref 130–400)
POTASSIUM SERPL-SCNC: 4.2 MEQ/L (ref 3.4–4.9)
RBC # BLD AUTO: 4.54 M/UL (ref 4.2–5.4)
REASON FOR REJECTION: NORMAL
REJECTED TEST: NORMAL
SODIUM SERPL-SCNC: 144 MEQ/L (ref 135–144)
WBC # BLD AUTO: 7 K/UL (ref 4.8–10.8)

## 2024-08-05 PROCEDURE — 85025 COMPLETE CBC W/AUTO DIFF WBC: CPT

## 2024-08-05 PROCEDURE — 80048 BASIC METABOLIC PNL TOTAL CA: CPT

## 2024-08-05 NOTE — TELEPHONE ENCOUNTER
calls this morning to report a set of Labs were dropped off with No Physician name, order was incomplete. Advised whoever dropped the specimen is responsible to have this completed. These are 'standing' orders as patient is still on IV Abx. Will fax over orders as found in Epic.   Fax # given: [phone].     called again to say the specimens were Not labelled correctly, Patient spells her name with x1 \"L\". Confirmed by 's License. Labs have been rejected. Faxed update to [facility].      F/u call to [facility], per Wendy they will redraw the Labs.

## 2024-08-07 ENCOUNTER — TELEPHONE (OUTPATIENT)
Dept: INFECTIOUS DISEASES | Age: 59
End: 2024-08-07

## 2024-08-07 ENCOUNTER — OFFICE VISIT (OUTPATIENT)
Dept: FAMILY MEDICINE CLINIC | Age: 59
End: 2024-08-07
Payer: COMMERCIAL

## 2024-08-07 VITALS
SYSTOLIC BLOOD PRESSURE: 118 MMHG | DIASTOLIC BLOOD PRESSURE: 76 MMHG | TEMPERATURE: 97.2 F | HEART RATE: 73 BPM | WEIGHT: 293 LBS | BODY MASS INDEX: 48.81 KG/M2 | OXYGEN SATURATION: 98 %

## 2024-08-07 DIAGNOSIS — L03.116 CELLULITIS OF LEFT LOWER EXTREMITY: ICD-10-CM

## 2024-08-07 DIAGNOSIS — Z09 HOSPITAL DISCHARGE FOLLOW-UP: ICD-10-CM

## 2024-08-07 DIAGNOSIS — Z12.11 COLON CANCER SCREENING: ICD-10-CM

## 2024-08-07 DIAGNOSIS — Z87.891 PERSONAL HISTORY OF TOBACCO USE: Primary | ICD-10-CM

## 2024-08-07 PROCEDURE — 1111F DSCHRG MED/CURRENT MED MERGE: CPT | Performed by: FAMILY MEDICINE

## 2024-08-07 PROCEDURE — G0296 VISIT TO DETERM LDCT ELIG: HCPCS | Performed by: FAMILY MEDICINE

## 2024-08-07 PROCEDURE — G8427 DOCREV CUR MEDS BY ELIG CLIN: HCPCS | Performed by: FAMILY MEDICINE

## 2024-08-07 PROCEDURE — 1036F TOBACCO NON-USER: CPT | Performed by: FAMILY MEDICINE

## 2024-08-07 PROCEDURE — 99214 OFFICE O/P EST MOD 30 MIN: CPT | Performed by: FAMILY MEDICINE

## 2024-08-07 PROCEDURE — 3017F COLORECTAL CA SCREEN DOC REV: CPT | Performed by: FAMILY MEDICINE

## 2024-08-07 PROCEDURE — G8417 CALC BMI ABV UP PARAM F/U: HCPCS | Performed by: FAMILY MEDICINE

## 2024-08-07 ASSESSMENT — PATIENT HEALTH QUESTIONNAIRE - PHQ9
2. FEELING DOWN, DEPRESSED OR HOPELESS: NOT AT ALL
4. FEELING TIRED OR HAVING LITTLE ENERGY: NOT AT ALL
SUM OF ALL RESPONSES TO PHQ9 QUESTIONS 1 & 2: 0
10. IF YOU CHECKED OFF ANY PROBLEMS, HOW DIFFICULT HAVE THESE PROBLEMS MADE IT FOR YOU TO DO YOUR WORK, TAKE CARE OF THINGS AT HOME, OR GET ALONG WITH OTHER PEOPLE: NOT DIFFICULT AT ALL
8. MOVING OR SPEAKING SO SLOWLY THAT OTHER PEOPLE COULD HAVE NOTICED. OR THE OPPOSITE, BEING SO FIGETY OR RESTLESS THAT YOU HAVE BEEN MOVING AROUND A LOT MORE THAN USUAL: NOT AT ALL
7. TROUBLE CONCENTRATING ON THINGS, SUCH AS READING THE NEWSPAPER OR WATCHING TELEVISION: NOT AT ALL
6. FEELING BAD ABOUT YOURSELF - OR THAT YOU ARE A FAILURE OR HAVE LET YOURSELF OR YOUR FAMILY DOWN: NOT AT ALL
3. TROUBLE FALLING OR STAYING ASLEEP: NOT AT ALL
SUM OF ALL RESPONSES TO PHQ QUESTIONS 1-9: 0
9. THOUGHTS THAT YOU WOULD BE BETTER OFF DEAD, OR OF HURTING YOURSELF: NOT AT ALL
SUM OF ALL RESPONSES TO PHQ QUESTIONS 1-9: 0
5. POOR APPETITE OR OVEREATING: NOT AT ALL
SUM OF ALL RESPONSES TO PHQ QUESTIONS 1-9: 0
SUM OF ALL RESPONSES TO PHQ QUESTIONS 1-9: 0
1. LITTLE INTEREST OR PLEASURE IN DOING THINGS: NOT AT ALL

## 2024-08-07 NOTE — TELEPHONE ENCOUNTER
Patient on IV Abx and script is until 8/8/24. Per review with Dr Griffin earlier this afternoon, Ok to Stop as prescribed and pull Picc line at EOT.  Call to [facility], (12:27pm), spoke with Mario, pharmacist, he verbalized orders. Patient to keep F/u appt on 8/13/24.

## 2024-08-07 NOTE — PROGRESS NOTES
Discussed with the patient the current USPSTF guidelines released March 9, 2021 for screening for lung cancer.    For adults aged 50 to 80 years who have a 20 pack-year smoking history and currently smoke or have quit within the past 15 years the grade B recommendation is to:  Screen for lung cancer with low-dose computed tomography (LDCT) every year.  Stop screening once a person has not smoked for 15 years or has a health problem that limits life expectancy or the ability to have lung surgery.    The patient  reports that she quit smoking about 10 years ago. Her smoking use included cigarettes. She started smoking about 40 years ago. She has a 52.5 pack-year smoking history. She has been exposed to tobacco smoke. She has never used smokeless tobacco.. Discussed with patient the risks and benefits of screening, including over-diagnosis, false positive rate, and total radiation exposure.  The patient currently exhibits no signs or symptoms suggestive of lung cancer.  Discussed with patient the importance of compliance with yearly annual lung cancer screenings and willingness to undergo diagnosis and treatment if screening scan is positive.  In addition, the patient was counseled regarding the importance of remaining smoke free and/or total smoking cessation.    Also reviewed the following if the patient has Medicare that as of February 10, 2022, Medicare only covers LDCT screening in patients aged 50-77 with at least a 20 pack-year smoking history who currently smoke or have quit in the last 15 years

## 2024-08-09 ENCOUNTER — TELEPHONE (OUTPATIENT)
Dept: ENDOSCOPY | Age: 59
End: 2024-08-09

## 2024-08-14 NOTE — PROGRESS NOTES
Post-Discharge Transitional Care  Follow Up      Manolo Poe   YOB: 1965    Date of Office Visit:  8/7/2024  Date of Hospital Admission: 7/21/24  Date of Hospital Discharge: 7/25/24  Risk of hospital readmission (high >=14%. Medium >=10%) :Readmission Risk Score: 10.4      Care management risk score Rising risk (score 2-5) and Complex Care (Scores >=6): No Risk Score On File     Non face to face  following discharge, date last encounter closed (first attempt may have been earlier): *No documented post hospital discharge outreach found in the last 14 days    Call initiated 2 business days of discharge: *No response recorded in the last 14 days    ASSESSMENT/PLAN:   Personal history of tobacco use  -     [facility] VISIT TO DISCUSS LUNG CA SCREEN W LDCT  -     CT Lung Screen (Initial/Annual/Baseline); Future  Cellulitis of left lower extremity  Improving: continue antibiotics until completion and follow up with infectious disease.  Colon cancer screening  -     [facility] discharge follow-up  -     [facility] DISCHARGE MEDS RECONCILED W/ CURRENT OUTPATIENT MED LIST      Medical Decision Making: high complexity  Return in 3 months (on 11/7/2024).           Subjective:   HPI:  Follow up of Hospital problems/diagnosis(es): Sepsis, Cellulitis of left lower extremity    Inpatient course: Discharge summary reviewed- see chart.    Interval history/Current status: Overall improved. No fever or chills. Antibiotic are near completed. Swelling and redness have resolved. Patient has follow up with ID     Patient Active Problem List   Diagnosis    Postoperative hypothyroidism    Papillary thyroid carcinoma (HCC)    PAF (paroxysmal atrial fibrillation) (HCC)    Abnormal stress test    Polyp of colon    ALEYDA (obstructive sleep apnea)    Chronic venous hypertension (idiopathic) with ulcer and inflammation of left lower extremity (HCC)    Morbid obesity (HCC)    Chronic saddle pulmonary embolism

## 2024-08-20 ENCOUNTER — PREP FOR PROCEDURE (OUTPATIENT)
Dept: GASTROENTEROLOGY | Age: 59
End: 2024-08-20

## 2024-08-20 RX ORDER — SODIUM CHLORIDE 0.9 % (FLUSH) 0.9 %
5-40 SYRINGE (ML) INJECTION EVERY 12 HOURS SCHEDULED
Status: CANCELLED | OUTPATIENT
Start: 2024-08-20

## 2024-08-20 RX ORDER — SODIUM CHLORIDE 9 MG/ML
INJECTION, SOLUTION INTRAVENOUS CONTINUOUS
Status: CANCELLED | OUTPATIENT
Start: 2024-08-20

## 2024-08-20 RX ORDER — SODIUM CHLORIDE 0.9 % (FLUSH) 0.9 %
5-40 SYRINGE (ML) INJECTION PRN
Status: CANCELLED | OUTPATIENT
Start: 2024-08-20

## 2024-08-20 RX ORDER — SODIUM CHLORIDE 9 MG/ML
INJECTION, SOLUTION INTRAVENOUS PRN
Status: CANCELLED | OUTPATIENT
Start: 2024-08-20

## 2024-09-11 ENCOUNTER — ANESTHESIA (OUTPATIENT)
Dept: ENDOSCOPY | Age: 59
End: 2024-09-11
Payer: COMMERCIAL

## 2024-09-11 ENCOUNTER — ANESTHESIA EVENT (OUTPATIENT)
Dept: ENDOSCOPY | Age: 59
End: 2024-09-11
Payer: COMMERCIAL

## 2024-09-11 ENCOUNTER — HOSPITAL ENCOUNTER (OUTPATIENT)
Age: 59
Setting detail: OUTPATIENT SURGERY
Discharge: HOME | End: 2024-09-11
Attending: INTERNAL MEDICINE | Admitting: INTERNAL MEDICINE
Payer: COMMERCIAL

## 2024-09-11 VITALS
SYSTOLIC BLOOD PRESSURE: 129 MMHG | RESPIRATION RATE: 20 BRPM | BODY MASS INDEX: 47.09 KG/M2 | DIASTOLIC BLOOD PRESSURE: 73 MMHG | TEMPERATURE: 98.1 F | HEART RATE: 67 BPM | OXYGEN SATURATION: 93 % | HEIGHT: 66 IN | WEIGHT: 293 LBS

## 2024-09-11 DIAGNOSIS — Z12.11 COLON CANCER SCREENING: ICD-10-CM

## 2024-09-11 PROCEDURE — 7100000010 HC PHASE II RECOVERY - FIRST 15 MIN: Performed by: INTERNAL MEDICINE

## 2024-09-11 PROCEDURE — 2709999900 HC NON-CHARGEABLE SUPPLY: Performed by: INTERNAL MEDICINE

## 2024-09-11 PROCEDURE — 2580000003 HC RX 258: Performed by: INTERNAL MEDICINE

## 2024-09-11 PROCEDURE — 7100000011 HC PHASE II RECOVERY - ADDTL 15 MIN: Performed by: INTERNAL MEDICINE

## 2024-09-11 PROCEDURE — 3700000000 HC ANESTHESIA ATTENDED CARE: Performed by: INTERNAL MEDICINE

## 2024-09-11 PROCEDURE — 3700000001 HC ADD 15 MINUTES (ANESTHESIA): Performed by: INTERNAL MEDICINE

## 2024-09-11 PROCEDURE — 45385 COLONOSCOPY W/LESION REMOVAL: CPT | Performed by: INTERNAL MEDICINE

## 2024-09-11 PROCEDURE — 2500000003 HC RX 250 WO HCPCS: Performed by: NURSE ANESTHETIST, CERTIFIED REGISTERED

## 2024-09-11 PROCEDURE — 88305 TISSUE EXAM BY PATHOLOGIST: CPT

## 2024-09-11 PROCEDURE — 3609027000 HC COLONOSCOPY: Performed by: INTERNAL MEDICINE

## 2024-09-11 PROCEDURE — 6360000002 HC RX W HCPCS: Performed by: NURSE ANESTHETIST, CERTIFIED REGISTERED

## 2024-09-11 RX ORDER — SODIUM CHLORIDE 0.9 % (FLUSH) 0.9 %
5-40 SYRINGE (ML) INJECTION PRN
Status: CANCELLED | OUTPATIENT
Start: 2024-09-11

## 2024-09-11 RX ORDER — SODIUM CHLORIDE 0.9 % (FLUSH) 0.9 %
5-40 SYRINGE (ML) INJECTION EVERY 12 HOURS SCHEDULED
Status: DISCONTINUED | OUTPATIENT
Start: 2024-09-11 | End: 2024-09-11 | Stop reason: HOSPADM

## 2024-09-11 RX ORDER — SODIUM CHLORIDE 9 MG/ML
INJECTION, SOLUTION INTRAVENOUS PRN
Status: DISCONTINUED | OUTPATIENT
Start: 2024-09-11 | End: 2024-09-11 | Stop reason: HOSPADM

## 2024-09-11 RX ORDER — LIDOCAINE HYDROCHLORIDE 20 MG/ML
INJECTION, SOLUTION INFILTRATION; PERINEURAL PRN
Status: DISCONTINUED | OUTPATIENT
Start: 2024-09-11 | End: 2024-09-11 | Stop reason: SDUPTHER

## 2024-09-11 RX ORDER — PROPOFOL 10 MG/ML
INJECTION, EMULSION INTRAVENOUS PRN
Status: DISCONTINUED | OUTPATIENT
Start: 2024-09-11 | End: 2024-09-11 | Stop reason: SDUPTHER

## 2024-09-11 RX ORDER — SODIUM CHLORIDE 0.9 % (FLUSH) 0.9 %
5-40 SYRINGE (ML) INJECTION PRN
Status: DISCONTINUED | OUTPATIENT
Start: 2024-09-11 | End: 2024-09-11 | Stop reason: HOSPADM

## 2024-09-11 RX ORDER — NALOXONE HYDROCHLORIDE 0.4 MG/ML
INJECTION, SOLUTION INTRAMUSCULAR; INTRAVENOUS; SUBCUTANEOUS PRN
Status: CANCELLED | OUTPATIENT
Start: 2024-09-11

## 2024-09-11 RX ORDER — SODIUM CHLORIDE 9 MG/ML
INJECTION, SOLUTION INTRAVENOUS CONTINUOUS
Status: DISCONTINUED | OUTPATIENT
Start: 2024-09-11 | End: 2024-09-11 | Stop reason: HOSPADM

## 2024-09-11 RX ORDER — ONDANSETRON 2 MG/ML
4 INJECTION INTRAMUSCULAR; INTRAVENOUS
Status: CANCELLED | OUTPATIENT
Start: 2024-09-11 | End: 2024-09-12

## 2024-09-11 RX ORDER — LIDOCAINE HYDROCHLORIDE 10 MG/ML
1 INJECTION, SOLUTION EPIDURAL; INFILTRATION; INTRACAUDAL; PERINEURAL
Status: CANCELLED | OUTPATIENT
Start: 2024-09-11 | End: 2024-09-12

## 2024-09-11 RX ORDER — SODIUM CHLORIDE 9 MG/ML
INJECTION, SOLUTION INTRAVENOUS PRN
Status: CANCELLED | OUTPATIENT
Start: 2024-09-11

## 2024-09-11 RX ORDER — SODIUM CHLORIDE 0.9 % (FLUSH) 0.9 %
5-40 SYRINGE (ML) INJECTION EVERY 12 HOURS SCHEDULED
Status: CANCELLED | OUTPATIENT
Start: 2024-09-11

## 2024-09-11 RX ORDER — ONDANSETRON 2 MG/ML
INJECTION INTRAMUSCULAR; INTRAVENOUS PRN
Status: DISCONTINUED | OUTPATIENT
Start: 2024-09-11 | End: 2024-09-11 | Stop reason: SDUPTHER

## 2024-09-11 RX ADMIN — PROPOFOL 390 MG: 10 INJECTION, EMULSION INTRAVENOUS at 09:40

## 2024-09-11 RX ADMIN — LIDOCAINE HYDROCHLORIDE 60 MG: 20 INJECTION, SOLUTION INFILTRATION; PERINEURAL at 09:40

## 2024-09-11 RX ADMIN — SODIUM CHLORIDE: 9 INJECTION, SOLUTION INTRAVENOUS at 09:20

## 2024-09-11 RX ADMIN — ONDANSETRON 4 MG: 2 INJECTION INTRAMUSCULAR; INTRAVENOUS at 09:39

## 2024-09-11 ASSESSMENT — PAIN SCALES - GENERAL
PAINLEVEL_OUTOF10: 0
PAINLEVEL_OUTOF10: 0

## 2024-09-11 ASSESSMENT — PAIN - FUNCTIONAL ASSESSMENT: PAIN_FUNCTIONAL_ASSESSMENT: 0-10

## 2024-09-17 ENCOUNTER — OFFICE VISIT (OUTPATIENT)
Dept: CARDIOLOGY CLINIC | Age: 59
End: 2024-09-17
Payer: COMMERCIAL

## 2024-09-17 VITALS
HEART RATE: 90 BPM | WEIGHT: 293 LBS | SYSTOLIC BLOOD PRESSURE: 170 MMHG | DIASTOLIC BLOOD PRESSURE: 120 MMHG | BODY MASS INDEX: 49.39 KG/M2 | OXYGEN SATURATION: 97 % | RESPIRATION RATE: 16 BRPM

## 2024-09-17 DIAGNOSIS — I48.0 PAF (PAROXYSMAL ATRIAL FIBRILLATION) (HCC): ICD-10-CM

## 2024-09-17 DIAGNOSIS — R73.03 PREDIABETES: ICD-10-CM

## 2024-09-17 DIAGNOSIS — C73 PAPILLARY THYROID CARCINOMA (HCC): ICD-10-CM

## 2024-09-17 DIAGNOSIS — I27.82 CHRONIC SADDLE PULMONARY EMBOLISM WITHOUT ACUTE COR PULMONALE (HCC): Primary | ICD-10-CM

## 2024-09-17 DIAGNOSIS — E03.9 HYPOTHYROIDISM, UNSPECIFIED TYPE: ICD-10-CM

## 2024-09-17 DIAGNOSIS — I26.92 CHRONIC SADDLE PULMONARY EMBOLISM WITHOUT ACUTE COR PULMONALE (HCC): Primary | ICD-10-CM

## 2024-09-17 LAB
ANION GAP SERPL CALCULATED.3IONS-SCNC: 12 MEQ/L (ref 9–15)
BUN SERPL-MCNC: 17 MG/DL (ref 6–20)
CALCIUM SERPL-MCNC: 9.2 MG/DL (ref 8.5–9.9)
CHLORIDE SERPL-SCNC: 102 MEQ/L (ref 95–107)
CO2 SERPL-SCNC: 29 MEQ/L (ref 20–31)
CREAT SERPL-MCNC: 1.23 MG/DL (ref 0.5–0.9)
GLUCOSE SERPL-MCNC: 76 MG/DL (ref 70–99)
POTASSIUM SERPL-SCNC: 4.2 MEQ/L (ref 3.4–4.9)
SODIUM SERPL-SCNC: 143 MEQ/L (ref 135–144)
T4 FREE SERPL-MCNC: 1.64 NG/DL (ref 0.84–1.68)
TSH SERPL-MCNC: 1.58 UIU/ML (ref 0.44–3.86)

## 2024-09-17 PROCEDURE — 3017F COLORECTAL CA SCREEN DOC REV: CPT | Performed by: INTERNAL MEDICINE

## 2024-09-17 PROCEDURE — G8417 CALC BMI ABV UP PARAM F/U: HCPCS | Performed by: INTERNAL MEDICINE

## 2024-09-17 PROCEDURE — 1036F TOBACCO NON-USER: CPT | Performed by: INTERNAL MEDICINE

## 2024-09-17 PROCEDURE — 93000 ELECTROCARDIOGRAM COMPLETE: CPT | Performed by: INTERNAL MEDICINE

## 2024-09-17 PROCEDURE — 99214 OFFICE O/P EST MOD 30 MIN: CPT | Performed by: INTERNAL MEDICINE

## 2024-09-17 PROCEDURE — G8427 DOCREV CUR MEDS BY ELIG CLIN: HCPCS | Performed by: INTERNAL MEDICINE

## 2024-09-17 ASSESSMENT — ENCOUNTER SYMPTOMS
STRIDOR: 0
COUGH: 0
CHEST TIGHTNESS: 0
WHEEZING: 0
GASTROINTESTINAL NEGATIVE: 1
BLOOD IN STOOL: 0
NAUSEA: 0
EYES NEGATIVE: 1
SHORTNESS OF BREATH: 1

## 2024-09-18 ENCOUNTER — OFFICE VISIT (OUTPATIENT)
Dept: ENDOCRINOLOGY | Age: 59
End: 2024-09-18
Payer: COMMERCIAL

## 2024-09-18 VITALS
SYSTOLIC BLOOD PRESSURE: 149 MMHG | OXYGEN SATURATION: 96 % | BODY MASS INDEX: 47.09 KG/M2 | HEART RATE: 62 BPM | WEIGHT: 293 LBS | DIASTOLIC BLOOD PRESSURE: 82 MMHG | HEIGHT: 66 IN

## 2024-09-18 DIAGNOSIS — E03.9 HYPOTHYROIDISM, UNSPECIFIED TYPE: ICD-10-CM

## 2024-09-18 DIAGNOSIS — R73.03 PREDIABETES: Primary | ICD-10-CM

## 2024-09-18 LAB
CHP ED QC CHECK: NORMAL
ESTIMATED AVERAGE GLUCOSE: 105 MG/DL
GLUCOSE BLD-MCNC: 106 MG/DL
HBA1C MFR BLD: 5.3 % (ref 4–6)
THYROGLOB IGG SER-ACNC: <12 IU/ML (ref 0–40)

## 2024-09-18 PROCEDURE — G8427 DOCREV CUR MEDS BY ELIG CLIN: HCPCS | Performed by: INTERNAL MEDICINE

## 2024-09-18 PROCEDURE — G8417 CALC BMI ABV UP PARAM F/U: HCPCS | Performed by: INTERNAL MEDICINE

## 2024-09-18 PROCEDURE — 82962 GLUCOSE BLOOD TEST: CPT | Performed by: INTERNAL MEDICINE

## 2024-09-18 PROCEDURE — 99213 OFFICE O/P EST LOW 20 MIN: CPT | Performed by: INTERNAL MEDICINE

## 2024-09-18 PROCEDURE — 3017F COLORECTAL CA SCREEN DOC REV: CPT | Performed by: INTERNAL MEDICINE

## 2024-09-18 PROCEDURE — 1036F TOBACCO NON-USER: CPT | Performed by: INTERNAL MEDICINE

## 2024-09-25 LAB — THYROGLOB SERPL-MCNC: <0.5 NG/ML (ref 1.3–31.8)

## 2024-10-11 PROBLEM — Z12.11 COLON CANCER SCREENING: Status: RESOLVED | Noted: 2024-09-11 | Resolved: 2024-10-11

## 2024-11-18 DIAGNOSIS — R73.03 PREDIABETES: ICD-10-CM

## 2024-11-18 DIAGNOSIS — G62.9 PERIPHERAL POLYNEUROPATHY: ICD-10-CM

## 2024-11-19 RX ORDER — GABAPENTIN 100 MG/1
100 CAPSULE ORAL DAILY
Qty: 30 CAPSULE | Refills: 3 | Status: SHIPPED | OUTPATIENT
Start: 2024-11-19 | End: 2024-12-19

## 2025-01-08 ENCOUNTER — OFFICE VISIT (OUTPATIENT)
Dept: PULMONOLOGY | Age: 60
End: 2025-01-08
Payer: COMMERCIAL

## 2025-01-08 VITALS
BODY MASS INDEX: 51.97 KG/M2 | HEART RATE: 71 BPM | OXYGEN SATURATION: 96 % | DIASTOLIC BLOOD PRESSURE: 90 MMHG | WEIGHT: 293 LBS | SYSTOLIC BLOOD PRESSURE: 146 MMHG

## 2025-01-08 DIAGNOSIS — I26.92 CHRONIC SADDLE PULMONARY EMBOLISM WITHOUT ACUTE COR PULMONALE (HCC): ICD-10-CM

## 2025-01-08 DIAGNOSIS — G47.33 OSA (OBSTRUCTIVE SLEEP APNEA): Primary | ICD-10-CM

## 2025-01-08 DIAGNOSIS — E03.9 HYPOTHYROIDISM, UNSPECIFIED TYPE: ICD-10-CM

## 2025-01-08 DIAGNOSIS — R73.03 PREDIABETES: ICD-10-CM

## 2025-01-08 DIAGNOSIS — E66.01 MORBID OBESITY: ICD-10-CM

## 2025-01-08 DIAGNOSIS — I27.82 CHRONIC SADDLE PULMONARY EMBOLISM WITHOUT ACUTE COR PULMONALE (HCC): ICD-10-CM

## 2025-01-08 LAB
ANION GAP SERPL CALCULATED.3IONS-SCNC: 8 MEQ/L (ref 9–15)
BUN SERPL-MCNC: 24 MG/DL (ref 6–20)
CALCIUM SERPL-MCNC: 9.2 MG/DL (ref 8.5–9.9)
CHLORIDE SERPL-SCNC: 104 MEQ/L (ref 95–107)
CO2 SERPL-SCNC: 31 MEQ/L (ref 20–31)
CREAT SERPL-MCNC: 1.21 MG/DL (ref 0.5–0.9)
ESTIMATED AVERAGE GLUCOSE: 111 MG/DL
GLUCOSE SERPL-MCNC: 107 MG/DL (ref 70–99)
HBA1C MFR BLD: 5.5 % (ref 4–6)
POTASSIUM SERPL-SCNC: 4.2 MEQ/L (ref 3.4–4.9)
SODIUM SERPL-SCNC: 143 MEQ/L (ref 135–144)
T4 FREE SERPL-MCNC: 1.59 NG/DL (ref 0.84–1.68)
TSH SERPL-MCNC: 2.51 UIU/ML (ref 0.44–3.86)

## 2025-01-08 PROCEDURE — 99214 OFFICE O/P EST MOD 30 MIN: CPT | Performed by: INTERNAL MEDICINE

## 2025-01-08 PROCEDURE — G8417 CALC BMI ABV UP PARAM F/U: HCPCS | Performed by: INTERNAL MEDICINE

## 2025-01-08 PROCEDURE — G8427 DOCREV CUR MEDS BY ELIG CLIN: HCPCS | Performed by: INTERNAL MEDICINE

## 2025-01-08 PROCEDURE — 1036F TOBACCO NON-USER: CPT | Performed by: INTERNAL MEDICINE

## 2025-01-08 PROCEDURE — 3017F COLORECTAL CA SCREEN DOC REV: CPT | Performed by: INTERNAL MEDICINE

## 2025-01-08 ASSESSMENT — ENCOUNTER SYMPTOMS
VOMITING: 0
SORE THROAT: 0
ABDOMINAL PAIN: 0
COUGH: 0
EYE DISCHARGE: 0
SINUS PRESSURE: 0
DIARRHEA: 0
RHINORRHEA: 0
TROUBLE SWALLOWING: 0
EYE ITCHING: 0
NAUSEA: 0
CHEST TIGHTNESS: 0
SHORTNESS OF BREATH: 0
VOICE CHANGE: 0
WHEEZING: 0

## 2025-01-08 NOTE — PROGRESS NOTES
Subjective:             Manolo Poe is a 59 y.o. female who complains today of:     Chief Complaint   Patient presents with    Follow-up     6m f/u on ALEYDA       HPI  She is using CPAP with 10 centimeters of H2O with heated humidity.  She is using CPAP for about 5  hours every night.  She is using CPAP with  Full face  Mask. No need for CPAP Supply   She said  sleep is restful with the CPAP use.  She is compliant with CPAP therapy and benefiting with CPAP use.  No snoring with CPAP use.No complaint of daytime sleepiness or tiredness with CPAP use.  She denies taking naps.No sleepiness with driving. 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     She is on eliquis for PE  4 yrs ago  she need to be on  life long anticoagulant as per patient .     I reviewed compliance report with patient regarding CPAP therapy. She is using  CPAP for 24 days out of 30 days.  Average usage of days used is 4 hours and 41 min , average AHI 1.4 with CPAP use.     Allergies:  Amoxicillin and Lisinopril  Past Medical History:   Diagnosis Date    Atrial fibrillation (HCC)     Hypertension     PE (pulmonary thromboembolism) (HCC) 11/2020    Ventricular tachycardia (HCC)      Past Surgical History:   Procedure Laterality Date    APPENDECTOMY      CARDIOVERSION  03/16/2021    CHOLECYSTECTOMY      COLONOSCOPY N/A 07/21/2021    COLONOSCOPY DIAGNOSTIC WITH POLYPECTOMY performed by Sergo Olivier MD at [facility]    COLONOSCOPY N/A 9/11/2024    COLORECTAL CANCER SCREENING, NOT HIGH RISK with polypectomy performed by Sergo Olivier MD at [facility]    GALLBLADDER SURGERY      IR MIDLINE CATH  7/24/2024    IR MIDLINE CATH 7/24/2024 [facility]    IR MIDLINE CATH  7/30/2024    IR MIDLINE CATH 7/30/2024 [facility]    THYROIDECTOMY      TONSILLECTOMY AND ADENOIDECTOMY      TUBAL LIGATION       Family History   Problem Relation Age of Onset    Stroke Mother         CVA

## 2025-01-15 ENCOUNTER — OFFICE VISIT (OUTPATIENT)
Dept: ENDOCRINOLOGY | Age: 60
End: 2025-01-15
Payer: COMMERCIAL

## 2025-01-15 VITALS
BODY MASS INDEX: 47.09 KG/M2 | DIASTOLIC BLOOD PRESSURE: 93 MMHG | HEART RATE: 89 BPM | SYSTOLIC BLOOD PRESSURE: 152 MMHG | WEIGHT: 293 LBS | HEIGHT: 66 IN

## 2025-01-15 DIAGNOSIS — R73.03 PREDIABETES: Primary | ICD-10-CM

## 2025-01-15 DIAGNOSIS — E03.9 HYPOTHYROIDISM, UNSPECIFIED TYPE: ICD-10-CM

## 2025-01-15 DIAGNOSIS — C73 PAPILLARY THYROID CARCINOMA (HCC): ICD-10-CM

## 2025-01-15 DIAGNOSIS — E66.01 MORBID OBESITY: ICD-10-CM

## 2025-01-15 LAB
CHP ED QC CHECK: NORMAL
GLUCOSE BLD-MCNC: 96 MG/DL

## 2025-01-15 PROCEDURE — G8427 DOCREV CUR MEDS BY ELIG CLIN: HCPCS | Performed by: INTERNAL MEDICINE

## 2025-01-15 PROCEDURE — 99214 OFFICE O/P EST MOD 30 MIN: CPT | Performed by: INTERNAL MEDICINE

## 2025-01-15 PROCEDURE — G8417 CALC BMI ABV UP PARAM F/U: HCPCS | Performed by: INTERNAL MEDICINE

## 2025-01-15 PROCEDURE — 82962 GLUCOSE BLOOD TEST: CPT | Performed by: INTERNAL MEDICINE

## 2025-01-15 PROCEDURE — 1036F TOBACCO NON-USER: CPT | Performed by: INTERNAL MEDICINE

## 2025-01-15 PROCEDURE — 3017F COLORECTAL CA SCREEN DOC REV: CPT | Performed by: INTERNAL MEDICINE

## 2025-01-15 RX ORDER — ONDANSETRON 4 MG/1
4 TABLET, ORALLY DISINTEGRATING ORAL 3 TIMES DAILY PRN
Qty: 21 TABLET | Refills: 2 | Status: SHIPPED | OUTPATIENT
Start: 2025-01-15

## 2025-01-15 ASSESSMENT — ENCOUNTER SYMPTOMS: NAUSEA: 1

## 2025-01-15 NOTE — PROGRESS NOTES
Disp: 3 mL, Rfl: 4    levothyroxine (SYNTHROID) 175 MCG tablet, 1 po daily, Disp: 90 tablet, Rfl: 3    metoprolol succinate (TOPROL XL) 100 MG extended release tablet, Take 1 tablet by mouth 2 times daily, Disp: 180 tablet, Rfl: 3    losartan (COZAAR) 25 MG tablet, Take 1 tablet by mouth daily, Disp: , Rfl:     atorvastatin (LIPITOR) 40 MG tablet, TAKE 1 TABLET BY MOUTH EVERY DAY, Disp: 90 tablet, Rfl: 3    amiodarone (CORDARONE) 200 MG tablet, Take 1 tablet by mouth daily, Disp: 90 tablet, Rfl: 3    vitamin B-6 (PYRIDOXINE) 100 MG tablet, daily, Disp: , Rfl:     CPAP Machine MISC, by Does not apply route New CPAP with 10 cm of H2O, Disp: 1 each, Rfl: 0    aspirin 81 MG EC tablet, Take 1 tablet by mouth daily, Disp: , Rfl:     Cyanocobalamin (B-12) 1000 MCG SUBL, Place under the tongue, Disp: , Rfl:     folic acid (FOLVITE) 800 MCG tablet, Take 1 tablet by mouth daily, Disp: , Rfl:     gabapentin (NEURONTIN) 100 MG capsule, Take 1 capsule by mouth daily for 30 days., Disp: 30 capsule, Rfl: 3  Lab Results   Component Value Date     01/08/2025    K 4.2 01/08/2025     01/08/2025    CO2 31 01/08/2025    BUN 24 (H) 01/08/2025    CREATININE 1.21 (H) 01/08/2025    GLUCOSE 107 (H) 01/08/2025    CALCIUM 9.2 01/08/2025    BILITOT 0.4 07/25/2024    ALKPHOS 113 07/25/2024    AST 32 07/25/2024    ALT 22 07/25/2024    LABGLOM 51.7 (L) 01/08/2025    GFRAA 58.4 (L) 08/31/2022    GLOB 3.2 07/25/2024     Lab Results   Component Value Date    WBC 7.0 08/05/2024    HGB 14.3 08/05/2024    HCT 44.7 08/05/2024    MCV 98.5 (H) 08/05/2024     08/05/2024     Lab Results   Component Value Date    LABA1C 5.5 01/08/2025    LABA1C 5.3 09/17/2024    LABA1C 5.7 06/10/2024     Lab Results   Component Value Date    CHOLFAST 98 10/27/2021    TRIGLYCFAST 99 10/27/2021    HDL 44 07/17/2023    HDL 42 10/27/2021    HDL 40 09/30/2020    CHOL 119 07/17/2023    CHOL 165 09/30/2020    TRIG 144 07/17/2023    TRIG 192 09/30/2020     No

## 2025-01-17 RX ORDER — AMIODARONE HYDROCHLORIDE 200 MG/1
200 TABLET ORAL DAILY
Qty: 90 TABLET | Refills: 3 | Status: SHIPPED | OUTPATIENT
Start: 2025-01-17

## 2025-01-17 NOTE — TELEPHONE ENCOUNTER
Requesting medication refill. Please approve or deny this request.    Rx requested:  Requested Prescriptions     Pending Prescriptions Disp Refills    amiodarone (CORDARONE) 200 MG tablet [Pharmacy Med Name: amiodarone 200 mg tablet] 90 tablet 3     Sig: TAKE 1 TABLET BY MOUTH EVERY DAY         Last Office Visit:   9/17/2024      Next Visit Date:  Future Appointments   Date Time Provider Department Center   2/7/2025 11:00 AM Michel Justice MD [facility]   3/20/2025  4:00 PM Hugo Herrera MD [facility]   4/16/2025 11:00 AM Patrick Preston MD [facility]   5/21/2025  1:30 PM Barney Ty MD [facility]

## 2025-01-27 DIAGNOSIS — R73.03 PREDIABETES: ICD-10-CM

## 2025-01-27 RX ORDER — SEMAGLUTIDE 1.34 MG/ML
INJECTION, SOLUTION SUBCUTANEOUS
Qty: 3 ML | Refills: 4 | OUTPATIENT
Start: 2025-01-27

## 2025-02-18 RX ORDER — ATORVASTATIN CALCIUM 40 MG/1
40 TABLET, FILM COATED ORAL DAILY
Qty: 90 TABLET | Refills: 3 | Status: SHIPPED | OUTPATIENT
Start: 2025-02-18

## 2025-02-18 NOTE — TELEPHONE ENCOUNTER
Requesting medication refill. Please approve or deny this request.    Rx requested:  Requested Prescriptions     Pending Prescriptions Disp Refills    atorvastatin (LIPITOR) 40 MG tablet [Pharmacy Med Name: atorvastatin 40 mg tablet] 90 tablet 3     Sig: TAKE 1 TABLET BY MOUTH EVERY DAY         Last Office Visit:   9/17/2024      Next Visit Date:  Future Appointments   Date Time Provider Department Center   3/20/2025  4:00 PM Hugo Herrera MD [facility]   4/16/2025 11:00 AM Patrick Preston MD [facility]   5/21/2025  1:30 PM Barney Ty MD [facility]

## 2025-02-23 DIAGNOSIS — E66.01 MORBID OBESITY: ICD-10-CM

## 2025-02-23 DIAGNOSIS — R73.03 PREDIABETES: ICD-10-CM

## 2025-02-23 DIAGNOSIS — E03.9 HYPOTHYROIDISM, UNSPECIFIED TYPE: ICD-10-CM

## 2025-02-24 RX ORDER — SEMAGLUTIDE 2.68 MG/ML
INJECTION, SOLUTION SUBCUTANEOUS
Qty: 3 ML | Refills: 3 | Status: SHIPPED | OUTPATIENT
Start: 2025-02-24

## 2025-03-18 DIAGNOSIS — G62.9 PERIPHERAL POLYNEUROPATHY: ICD-10-CM

## 2025-03-18 DIAGNOSIS — R73.03 PREDIABETES: ICD-10-CM

## 2025-03-18 RX ORDER — GABAPENTIN 100 MG/1
100 CAPSULE ORAL DAILY
Qty: 30 CAPSULE | Refills: 3 | Status: SHIPPED | OUTPATIENT
Start: 2025-03-18 | End: 2025-04-17

## 2025-03-20 ENCOUNTER — OFFICE VISIT (OUTPATIENT)
Dept: CARDIOLOGY CLINIC | Age: 60
End: 2025-03-20

## 2025-03-20 VITALS
SYSTOLIC BLOOD PRESSURE: 130 MMHG | DIASTOLIC BLOOD PRESSURE: 98 MMHG | HEART RATE: 84 BPM | BODY MASS INDEX: 34.72 KG/M2 | OXYGEN SATURATION: 96 % | WEIGHT: 215 LBS

## 2025-03-20 DIAGNOSIS — L03.116 CELLULITIS OF LEFT LOWER EXTREMITY: ICD-10-CM

## 2025-03-20 DIAGNOSIS — E78.5 DYSLIPIDEMIA: ICD-10-CM

## 2025-03-20 DIAGNOSIS — I48.0 PAF (PAROXYSMAL ATRIAL FIBRILLATION) (HCC): ICD-10-CM

## 2025-03-20 DIAGNOSIS — I26.92 CHRONIC SADDLE PULMONARY EMBOLISM WITHOUT ACUTE COR PULMONALE (HCC): ICD-10-CM

## 2025-03-20 DIAGNOSIS — I26.02 SADDLE EMBOLUS OF PULMONARY ARTERY WITH ACUTE COR PULMONALE, UNSPECIFIED CHRONICITY (HCC): ICD-10-CM

## 2025-03-20 DIAGNOSIS — I27.82 CHRONIC SADDLE PULMONARY EMBOLISM WITHOUT ACUTE COR PULMONALE (HCC): ICD-10-CM

## 2025-03-20 DIAGNOSIS — I25.10 CORONARY ARTERY DISEASE INVOLVING NATIVE CORONARY ARTERY OF NATIVE HEART WITHOUT ANGINA PECTORIS: ICD-10-CM

## 2025-03-20 DIAGNOSIS — Z00.00 ROUTINE ADULT HEALTH MAINTENANCE: Primary | ICD-10-CM

## 2025-03-20 DIAGNOSIS — R06.09 DOE (DYSPNEA ON EXERTION): ICD-10-CM

## 2025-03-20 DIAGNOSIS — I48.91 ATRIAL FIBRILLATION, UNSPECIFIED TYPE (HCC): ICD-10-CM

## 2025-03-20 DIAGNOSIS — I10 ESSENTIAL HYPERTENSION: ICD-10-CM

## 2025-03-20 DIAGNOSIS — I82.533 CHRONIC DEEP VEIN THROMBOSIS (DVT) OF POPLITEAL VEIN OF BOTH LOWER EXTREMITIES: ICD-10-CM

## 2025-03-20 RX ORDER — LOSARTAN POTASSIUM 25 MG/1
50 TABLET ORAL 2 TIMES DAILY
Qty: 30 TABLET | Refills: 3
Start: 2025-03-20

## 2025-03-20 RX ORDER — AMLODIPINE BESYLATE 10 MG/1
10 TABLET ORAL DAILY
Qty: 90 TABLET | Refills: 3 | Status: SHIPPED | OUTPATIENT
Start: 2025-03-20

## 2025-03-20 ASSESSMENT — ENCOUNTER SYMPTOMS
GASTROINTESTINAL NEGATIVE: 1
WHEEZING: 0
NAUSEA: 0
BLOOD IN STOOL: 0
EYES NEGATIVE: 1
CHEST TIGHTNESS: 0
STRIDOR: 0
COUGH: 0
SHORTNESS OF BREATH: 1

## 2025-03-20 NOTE — PROGRESS NOTES
OFFICE VISIT         Patient: Manolo Poe  YOB: 1965  MRN: [id number]    Chief Complaint: DVT PE AF BECKWITH   Chief Complaint   Patient presents with    Follow-up     6 month  BP last week was high   Took BP today   116/76 upper left arm   136/82 lower left arm        CV Data:  11/2020 Unprovoked B/L DVT and Saddle Embolism   Thyroid cancer s/p Thyroidectomy   4/21 SPECT abn anterior   5/12/21 Cath LAD - Aneurysmal dilation with moderate sequential lesions 50-60 and  IFR negative.  EF 60       Subjective/HPI pt is SOB with any ADLs. No cp currently. Compliant with all meds. No bleed. No falls. haad AF since PE.  Was seeing Dr. Becerra but changing. There were plans for CVN and Antiarrhythmic    3/23/21 still winded with ADLs. No pain. Went back into AF. No cp tired.     5/3/21 still BECKWITH no cp no bleed. No falls takes meds    6/23/21 still Beckwith no cp takes meds. No falls no bleed. Having pain with heel spurrs.     10/20/21 DOING WELL NO CP NO SOB No falls no bleed takes meds. Discomfort from heel spurs.     2/23/22 doing well no cp no sob she can not sense AF. No bleed. Takes meds.     6/29/22 still BECKWITH no cp gaining weight no cp \    10/31/22 doing well no cp no sob no palps now nop fa;lls nmo bleed. Take smeds.     3/1/23 since lowering synthroid feels much better and less palps. No cp no sob no falls no bleed.     7/5/23 doing well no new events no cp same beckwith no falls no bleed     11/22/23 doing well no cp no sob no falls no bleed. Takes meds. Had recent left leg injury and edema.  US dozen and no DVT. On DOAC.    1/17/24 recent Cellulitis and sepsis at [facility].  They stopped Losartan HCTZ and lowered BB to 100 bid. Added Amiodarone.   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 No cp no sob no falls no bleed.     9/17/24 doing much better no cp no sob no falls no bleed BP very good at home.     3/20/25 recent HTN ARB advanced from 25 to 50 bid. BP still elevated. No CP no sob occ

## 2025-04-16 ENCOUNTER — OFFICE VISIT (OUTPATIENT)
Age: 60
End: 2025-04-16
Payer: COMMERCIAL

## 2025-04-16 VITALS
DIASTOLIC BLOOD PRESSURE: 84 MMHG | WEIGHT: 293 LBS | HEART RATE: 77 BPM | OXYGEN SATURATION: 96 % | BODY MASS INDEX: 49.74 KG/M2 | SYSTOLIC BLOOD PRESSURE: 122 MMHG

## 2025-04-16 DIAGNOSIS — I27.82 CHRONIC SADDLE PULMONARY EMBOLISM WITHOUT ACUTE COR PULMONALE (HCC): ICD-10-CM

## 2025-04-16 DIAGNOSIS — G47.33 OSA (OBSTRUCTIVE SLEEP APNEA): Primary | ICD-10-CM

## 2025-04-16 DIAGNOSIS — I26.92 CHRONIC SADDLE PULMONARY EMBOLISM WITHOUT ACUTE COR PULMONALE (HCC): ICD-10-CM

## 2025-04-16 DIAGNOSIS — E66.01 MORBID OBESITY: ICD-10-CM

## 2025-04-16 PROCEDURE — G8427 DOCREV CUR MEDS BY ELIG CLIN: HCPCS | Performed by: INTERNAL MEDICINE

## 2025-04-16 PROCEDURE — 3017F COLORECTAL CA SCREEN DOC REV: CPT | Performed by: INTERNAL MEDICINE

## 2025-04-16 PROCEDURE — 1036F TOBACCO NON-USER: CPT | Performed by: INTERNAL MEDICINE

## 2025-04-16 PROCEDURE — G8417 CALC BMI ABV UP PARAM F/U: HCPCS | Performed by: INTERNAL MEDICINE

## 2025-04-16 PROCEDURE — 99214 OFFICE O/P EST MOD 30 MIN: CPT | Performed by: INTERNAL MEDICINE

## 2025-04-16 ASSESSMENT — ENCOUNTER SYMPTOMS
EYE ITCHING: 0
DIARRHEA: 0
EYE DISCHARGE: 0
CHEST TIGHTNESS: 0
VOICE CHANGE: 0
TROUBLE SWALLOWING: 0
VOMITING: 0
SHORTNESS OF BREATH: 0
SORE THROAT: 0
WHEEZING: 0
COUGH: 0
RHINORRHEA: 0
SINUS PRESSURE: 0
NAUSEA: 0
ABDOMINAL PAIN: 0

## 2025-04-16 NOTE — PROGRESS NOTES
Subjective:             Manolo Poe is a 60 y.o. female who complains today of:     Chief Complaint   Patient presents with    Follow-up     3m f/u on ALEYDA       HPI  She is using CPAP with 10 centimeters of H2O with heated humidity.  She is using CPAP for about 5  hours every night.  She is using CPAP with  Full face  Mask. No need for CPAP Supply   She said  sleep is restful with the CPAP use.  She is compliant with CPAP therapy and benefiting with CPAP use.  No snoring with CPAP use.No complaint of daytime sleepiness or tiredness with CPAP use. She denies taking naps.No sleepiness with driving. 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      She is on eliquis for PE  4 yrs ago  she need to be on  life long anticoagulant as per patient .      I reviewed compliance report with patient regarding CPAP therapy. She is using  CPAP for 30 days out of 30 days.  Average usage of days used is 6 hours and 5  min , average AHI 1.6  with CPAP use.    Allergies:  Amoxicillin and Lisinopril  Past Medical History:   Diagnosis Date    Atrial fibrillation (HCC)     Hypertension     PE (pulmonary thromboembolism) (HCC) 11/2020    Ventricular tachycardia (HCC)      Past Surgical History:   Procedure Laterality Date    APPENDECTOMY      CARDIOVERSION  03/16/2021    CHOLECYSTECTOMY      COLONOSCOPY N/A 07/21/2021    COLONOSCOPY DIAGNOSTIC WITH POLYPECTOMY performed by Sergo Olivier MD at [facility]    COLONOSCOPY N/A 9/11/2024    COLORECTAL CANCER SCREENING, NOT HIGH RISK with polypectomy performed by Sergo Olivier MD at [facility]    GALLBLADDER SURGERY      IR MIDLINE CATH  7/24/2024    IR MIDLINE CATH 7/24/2024 [facility]    IR MIDLINE CATH  7/30/2024    IR MIDLINE CATH 7/30/2024 [facility]    THYROIDECTOMY      TONSILLECTOMY AND ADENOIDECTOMY      TUBAL LIGATION       Family History   Problem Relation Age of Onset    Stroke Mother         CVA

## 2025-05-19 DIAGNOSIS — E03.9 HYPOTHYROIDISM, UNSPECIFIED TYPE: ICD-10-CM

## 2025-05-19 DIAGNOSIS — R73.03 PREDIABETES: ICD-10-CM

## 2025-05-19 DIAGNOSIS — E66.01 MORBID OBESITY (HCC): ICD-10-CM

## 2025-05-19 NOTE — TELEPHONE ENCOUNTER
Good Morning Dr Herrera,       Your patient Manolo Poe is in the process of being scheduled for a colonoscopy. We need to know how long she will be able to hold Eliquis prior to having procedure done.  Thank you   0 = swallows foods/liquids without difficulty

## 2025-05-20 LAB
ANION GAP SERPL CALCULATED.3IONS-SCNC: 15 MEQ/L (ref 9–15)
BUN SERPL-MCNC: 25 MG/DL (ref 6–20)
CALCIUM SERPL-MCNC: 9.9 MG/DL (ref 8.5–9.9)
CHLORIDE SERPL-SCNC: 103 MEQ/L (ref 95–107)
CO2 SERPL-SCNC: 24 MEQ/L (ref 20–31)
CREAT SERPL-MCNC: 1.62 MG/DL (ref 0.5–0.9)
ESTIMATED AVERAGE GLUCOSE: 111 MG/DL
GLUCOSE SERPL-MCNC: 81 MG/DL (ref 70–99)
HBA1C MFR BLD: 5.5 % (ref 4–6)
POTASSIUM SERPL-SCNC: 4.5 MEQ/L (ref 3.4–4.9)
SODIUM SERPL-SCNC: 142 MEQ/L (ref 135–144)
T4 FREE SERPL-MCNC: 1.8 NG/DL (ref 0.84–1.68)
THYROGLOB AB SERPL-ACNC: <1.5 IU/ML (ref 0–4)
THYROGLOB SERPL-MCNC: 0.1 NG/ML (ref 1.3–31.8)
THYROGLOB SERPL-MCNC: ABNORMAL NG/ML (ref 1.3–31.8)
TSH SERPL-MCNC: 1.06 UIU/ML (ref 0.44–3.86)

## 2025-05-21 ENCOUNTER — OFFICE VISIT (OUTPATIENT)
Age: 60
End: 2025-05-21
Payer: COMMERCIAL

## 2025-05-21 VITALS
HEART RATE: 69 BPM | BODY MASS INDEX: 48.82 KG/M2 | WEIGHT: 293 LBS | DIASTOLIC BLOOD PRESSURE: 76 MMHG | HEIGHT: 65 IN | SYSTOLIC BLOOD PRESSURE: 116 MMHG

## 2025-05-21 DIAGNOSIS — E03.9 HYPOTHYROIDISM, UNSPECIFIED TYPE: ICD-10-CM

## 2025-05-21 DIAGNOSIS — C73 PAPILLARY THYROID CARCINOMA (HCC): ICD-10-CM

## 2025-05-21 DIAGNOSIS — R73.03 PREDIABETES: Primary | ICD-10-CM

## 2025-05-21 DIAGNOSIS — E66.01 MORBID OBESITY (HCC): ICD-10-CM

## 2025-05-21 PROCEDURE — G8417 CALC BMI ABV UP PARAM F/U: HCPCS | Performed by: INTERNAL MEDICINE

## 2025-05-21 PROCEDURE — 3017F COLORECTAL CA SCREEN DOC REV: CPT | Performed by: INTERNAL MEDICINE

## 2025-05-21 PROCEDURE — G8428 CUR MEDS NOT DOCUMENT: HCPCS | Performed by: INTERNAL MEDICINE

## 2025-05-21 PROCEDURE — 99214 OFFICE O/P EST MOD 30 MIN: CPT | Performed by: INTERNAL MEDICINE

## 2025-05-21 PROCEDURE — 1036F TOBACCO NON-USER: CPT | Performed by: INTERNAL MEDICINE

## 2025-05-21 NOTE — PROGRESS NOTES
atorvastatin (LIPITOR) 40 MG tablet, TAKE 1 TABLET BY MOUTH EVERY DAY, Disp: 90 tablet, Rfl: 3    amiodarone (CORDARONE) 200 MG tablet, TAKE 1 TABLET BY MOUTH EVERY DAY, Disp: 90 tablet, Rfl: 3    ondansetron (ZOFRAN-ODT) 4 MG disintegrating tablet, Take 1 tablet by mouth 3 times daily as needed for Nausea or Vomiting, Disp: 21 tablet, Rfl: 2    ondansetron (ZOFRAN-ODT) 4 MG disintegrating tablet, Take 1 tablet by mouth every 8 hours as needed for Nausea or Vomiting, Disp: 20 tablet, Rfl: 0    apixaban (ELIQUIS) 5 MG TABS tablet, Take 1 tablet by mouth 2 times daily, Disp: 180 tablet, Rfl: 3    levothyroxine (SYNTHROID) 175 MCG tablet, 1 po daily, Disp: 90 tablet, Rfl: 3    metoprolol succinate (TOPROL XL) 100 MG extended release tablet, Take 1 tablet by mouth 2 times daily, Disp: 180 tablet, Rfl: 3    vitamin B-6 (PYRIDOXINE) 100 MG tablet, daily, Disp: , Rfl:     CPAP Machine MISC, by Does not apply route New CPAP with 10 cm of H2O, Disp: 1 each, Rfl: 0    aspirin 81 MG EC tablet, Take 1 tablet by mouth daily, Disp: , Rfl:     Cyanocobalamin (B-12) 1000 MCG SUBL, Place under the tongue, Disp: , Rfl:     folic acid (FOLVITE) 800 MCG tablet, Take 1 tablet by mouth daily, Disp: , Rfl:     gabapentin (NEURONTIN) 100 MG capsule, Take 1 capsule by mouth daily for 30 days., Disp: 30 capsule, Rfl: 3  Lab Results   Component Value Date     05/19/2025    K 4.5 05/19/2025     05/19/2025    CO2 24 05/19/2025    BUN 25 (H) 05/19/2025    CREATININE 1.62 (H) 05/19/2025    GLUCOSE 81 05/19/2025    CALCIUM 9.9 05/19/2025    BILITOT 0.4 07/25/2024    ALKPHOS 113 07/25/2024    AST 32 07/25/2024    ALT 22 07/25/2024    LABGLOM 36.3 (L) 05/19/2025    GFRAA 58.4 (L) 08/31/2022    GLOB 3.2 07/25/2024     Lab Results   Component Value Date    WBC 7.0 08/05/2024    HGB 14.3 08/05/2024    HCT 44.7 08/05/2024    MCV 98.5 (H) 08/05/2024     08/05/2024     Lab Results   Component Value Date    LABA1C 5.5 05/19/2025

## 2025-06-14 DIAGNOSIS — E03.9 HYPOTHYROIDISM, UNSPECIFIED TYPE: ICD-10-CM

## 2025-06-14 DIAGNOSIS — E66.01 MORBID OBESITY (HCC): ICD-10-CM

## 2025-06-14 DIAGNOSIS — R73.03 PREDIABETES: ICD-10-CM

## 2025-06-16 RX ORDER — SEMAGLUTIDE 2.68 MG/ML
INJECTION, SOLUTION SUBCUTANEOUS
Qty: 3 ML | Refills: 3 | Status: SHIPPED | OUTPATIENT
Start: 2025-06-16

## 2025-06-23 DIAGNOSIS — I10 ESSENTIAL HYPERTENSION: ICD-10-CM

## 2025-06-23 DIAGNOSIS — I48.0 PAF (PAROXYSMAL ATRIAL FIBRILLATION) (HCC): ICD-10-CM

## 2025-06-23 DIAGNOSIS — E03.9 HYPOTHYROIDISM, UNSPECIFIED TYPE: ICD-10-CM

## 2025-06-23 RX ORDER — METOPROLOL SUCCINATE 100 MG/1
100 TABLET, EXTENDED RELEASE ORAL 2 TIMES DAILY
Qty: 180 TABLET | Refills: 3 | Status: SHIPPED | OUTPATIENT
Start: 2025-06-23

## 2025-06-23 RX ORDER — LEVOTHYROXINE SODIUM 175 UG/1
175 TABLET ORAL DAILY
Qty: 90 TABLET | Refills: 3 | Status: SHIPPED | OUTPATIENT
Start: 2025-06-23

## 2025-06-23 RX ORDER — ONDANSETRON 4 MG/1
4 TABLET, ORALLY DISINTEGRATING ORAL 3 TIMES DAILY PRN
Qty: 21 TABLET | Refills: 2 | Status: SHIPPED | OUTPATIENT
Start: 2025-06-23

## 2025-06-23 RX ORDER — APIXABAN 5 MG/1
5 TABLET, FILM COATED ORAL 2 TIMES DAILY
Qty: 180 TABLET | Refills: 3 | Status: SHIPPED | OUTPATIENT
Start: 2025-06-23

## 2025-06-23 NOTE — TELEPHONE ENCOUNTER
Requesting medication refill. Please approve or deny this request.    Rx requested:  Requested Prescriptions     Pending Prescriptions Disp Refills    ELIQUIS 5 MG TABS tablet [Pharmacy Med Name: Eliquis 5 mg tablet] 180 tablet 3     Sig: Take 1 tablet by mouth 2 times daily    metoprolol succinate (TOPROL XL) 100 MG extended release tablet [Pharmacy Med Name: metoprolol succinate  mg tablet,extended release 24 hr] 180 tablet 3     Sig: TAKE 1 TABLET TWICE DAILY         Last Office Visit:   3/20/2025      Next Visit Date:  Future Appointments   Date Time Provider Department Center   7/21/2025  3:45 PM Hugo Herrera MD [facility]   9/17/2025 11:15 AM Patrick Preston MD [facility]   11/26/2025  1:30 PM Barney Ty MD [facility]

## 2025-07-21 ENCOUNTER — OFFICE VISIT (OUTPATIENT)
Age: 60
End: 2025-07-21
Payer: COMMERCIAL

## 2025-07-21 VITALS
DIASTOLIC BLOOD PRESSURE: 80 MMHG | BODY MASS INDEX: 51.59 KG/M2 | OXYGEN SATURATION: 94 % | WEIGHT: 293 LBS | HEART RATE: 73 BPM | SYSTOLIC BLOOD PRESSURE: 120 MMHG

## 2025-07-21 DIAGNOSIS — I48.0 PAF (PAROXYSMAL ATRIAL FIBRILLATION) (HCC): Primary | ICD-10-CM

## 2025-07-21 PROCEDURE — 1036F TOBACCO NON-USER: CPT | Performed by: INTERNAL MEDICINE

## 2025-07-21 PROCEDURE — 99214 OFFICE O/P EST MOD 30 MIN: CPT | Performed by: INTERNAL MEDICINE

## 2025-07-21 PROCEDURE — G8427 DOCREV CUR MEDS BY ELIG CLIN: HCPCS | Performed by: INTERNAL MEDICINE

## 2025-07-21 PROCEDURE — 3017F COLORECTAL CA SCREEN DOC REV: CPT | Performed by: INTERNAL MEDICINE

## 2025-07-21 PROCEDURE — 93000 ELECTROCARDIOGRAM COMPLETE: CPT | Performed by: INTERNAL MEDICINE

## 2025-07-21 PROCEDURE — G8417 CALC BMI ABV UP PARAM F/U: HCPCS | Performed by: INTERNAL MEDICINE

## 2025-07-21 ASSESSMENT — ENCOUNTER SYMPTOMS
CHEST TIGHTNESS: 0
GASTROINTESTINAL NEGATIVE: 1
SHORTNESS OF BREATH: 1
EYES NEGATIVE: 1
COUGH: 0
NAUSEA: 0
WHEEZING: 0
BLOOD IN STOOL: 0
STRIDOR: 0

## 2025-07-21 NOTE — PROGRESS NOTES
OFFICE VISIT         Patient: Manolo Poe  YOB: 1965  MRN: [id number]    Chief Complaint: DVT PE AF BECKWITH   Chief Complaint   Patient presents with    Atrial Fibrillation       CV Data:  11/2020 Unprovoked B/L DVT and Saddle Embolism   Thyroid cancer s/p Thyroidectomy   4/21 SPECT abn anterior   5/12/21 Cath LAD - Aneurysmal dilation with moderate sequential lesions 50-60 and  IFR negative.  EF 60       Subjective/HPI pt is SOB with any ADLs. No cp currently. Compliant with all meds. No bleed. No falls. haad AF since PE.  Was seeing Dr. Becerra but changing. There were plans for CVN and Antiarrhythmic    3/23/21 still winded with ADLs. No pain. Went back into AF. No cp tired.     5/3/21 still BECKWITH no cp no bleed. No falls takes meds    6/23/21 still Beckwith no cp takes meds. No falls no bleed. Having pain with heel spurrs.     10/20/21 DOING WELL NO CP NO SOB No falls no bleed takes meds. Discomfort from heel spurs.     2/23/22 doing well no cp no sob she can not sense AF. No bleed. Takes meds.     6/29/22 still BECKWITH no cp gaining weight no cp \    10/31/22 doing well no cp no sob no palps now nop fa;lls nmo bleed. Take smeds.     3/1/23 since lowering synthroid feels much better and less palps. No cp no sob no falls no bleed.     7/5/23 doing well no new events no cp same beckwith no falls no bleed     11/22/23 doing well no cp no sob no falls no bleed. Takes meds. Had recent left leg injury and edema.  US dozen and no DVT. On DOAC.    1/17/24 recent Cellulitis and sepsis at [facility].  They stopped Losartan HCTZ and lowered BB to 100 bid. Added Amiodarone.   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 No cp no sob no falls no bleed.     9/17/24 doing much better no cp no sob no falls no bleed BP very good at home.     3/20/25 recent HTN ARB advanced from 25 to 50 bid. BP still elevated. No CP no sob occ [place]/ no falls no bleed.     7/21/25 doing fine but bothered by CP that that can occur

## 2025-07-26 DIAGNOSIS — R73.03 PREDIABETES: ICD-10-CM

## 2025-07-26 DIAGNOSIS — G62.9 PERIPHERAL POLYNEUROPATHY: ICD-10-CM

## 2025-07-28 RX ORDER — GABAPENTIN 100 MG/1
100 CAPSULE ORAL DAILY
Qty: 30 CAPSULE | Refills: 3 | Status: SHIPPED | OUTPATIENT
Start: 2025-07-28 | End: 2025-08-27

## (undated) DEVICE — PLATE ES AD W 9FT CRD 2

## (undated) DEVICE — TUBE SET 96 MM 64 MM H2O PERISTALTIC STD AUX CHANNEL

## (undated) DEVICE — SINGLE PORT MANIFOLD: Brand: NEPTUNE 2

## (undated) DEVICE — TUBING, SUCTION, 1/4" X 10', STRAIGHT: Brand: MEDLINE

## (undated) DEVICE — ENDO CARRY-ON PROCEDURE KIT: Brand: ENDO CARRY-ON PROCEDURE KIT

## (undated) DEVICE — FORCEPS BX L240CM JAW DIA2.8MM L CAP W/ NDL MIC MESH TOOTH

## (undated) DEVICE — TUBING IRRIGATION 140/160/180/190 SER GI ENDOSCP SMARTCAP

## (undated) DEVICE — Device: Brand: ENDO SMARTCAP

## (undated) DEVICE — BRUSH ENDO CLN L90.5IN SHTH DIA1.7MM BRIST DIA5-7MM 2-6MM

## (undated) DEVICE — GLOVE ORANGE PI 8 1/2   MSG9085

## (undated) DEVICE — TRAP POLYP BALEEN

## (undated) DEVICE — SNARE ENDOSCP L240CM OD24MM LOOP W10MM RND INSUL STIFF BRAID

## (undated) DEVICE — SNARE ENDOSCP AD L240CM LOOP W10MM SHTH DIA2.4MM RND INSUL

## (undated) DEVICE — ADAPTER FLSH PMP FLD MGMT GI IRRIG OFP 2 DISPOSABLE